# Patient Record
Sex: FEMALE | Race: WHITE | NOT HISPANIC OR LATINO | Employment: OTHER | URBAN - METROPOLITAN AREA
[De-identification: names, ages, dates, MRNs, and addresses within clinical notes are randomized per-mention and may not be internally consistent; named-entity substitution may affect disease eponyms.]

---

## 2020-09-09 ENCOUNTER — HOSPITAL ENCOUNTER (EMERGENCY)
Facility: HOSPITAL | Age: 85
Discharge: NURSING, REHAB OR LONG-TERM CARE | End: 2020-09-09
Attending: EMERGENCY MEDICINE | Admitting: EMERGENCY MEDICINE

## 2020-09-09 ENCOUNTER — APPOINTMENT (OUTPATIENT)
Dept: CT IMAGING | Facility: HOSPITAL | Age: 85
End: 2020-09-09

## 2020-09-09 VITALS
OXYGEN SATURATION: 98 % | RESPIRATION RATE: 16 BRPM | HEART RATE: 63 BPM | DIASTOLIC BLOOD PRESSURE: 83 MMHG | TEMPERATURE: 98.2 F | SYSTOLIC BLOOD PRESSURE: 146 MMHG

## 2020-09-09 DIAGNOSIS — H61.23 BILATERAL HEARING LOSS DUE TO CERUMEN IMPACTION: Primary | ICD-10-CM

## 2020-09-09 DIAGNOSIS — Z79.01 ANTICOAGULATED BY ANTICOAGULATION TREATMENT: ICD-10-CM

## 2020-09-09 DIAGNOSIS — R53.1 GENERALIZED WEAKNESS: ICD-10-CM

## 2020-09-09 LAB
ALBUMIN SERPL-MCNC: 3 G/DL (ref 3.5–5.2)
ALBUMIN/GLOB SERPL: 1.1 G/DL
ALP SERPL-CCNC: 83 U/L (ref 39–117)
ALT SERPL W P-5'-P-CCNC: 18 U/L (ref 1–33)
ANION GAP SERPL CALCULATED.3IONS-SCNC: 6 MMOL/L (ref 5–15)
AST SERPL-CCNC: 14 U/L (ref 1–32)
BASOPHILS # BLD AUTO: 0.03 10*3/MM3 (ref 0–0.2)
BASOPHILS NFR BLD AUTO: 0.4 % (ref 0–1.5)
BILIRUB SERPL-MCNC: 0.3 MG/DL (ref 0–1.2)
BUN SERPL-MCNC: 22 MG/DL (ref 8–23)
BUN/CREAT SERPL: 25.6 (ref 7–25)
CALCIUM SPEC-SCNC: 10.4 MG/DL (ref 8.6–10.5)
CHLORIDE SERPL-SCNC: 100 MMOL/L (ref 98–107)
CO2 SERPL-SCNC: 31 MMOL/L (ref 22–29)
CREAT SERPL-MCNC: 0.86 MG/DL (ref 0.57–1)
DEPRECATED RDW RBC AUTO: 40.6 FL (ref 37–54)
EOSINOPHIL # BLD AUTO: 0.32 10*3/MM3 (ref 0–0.4)
EOSINOPHIL NFR BLD AUTO: 4.3 % (ref 0.3–6.2)
ERYTHROCYTE [DISTWIDTH] IN BLOOD BY AUTOMATED COUNT: 11.9 % (ref 12.3–15.4)
GFR SERPL CREATININE-BSD FRML MDRD: 63 ML/MIN/1.73
GLOBULIN UR ELPH-MCNC: 2.8 GM/DL
GLUCOSE SERPL-MCNC: 180 MG/DL (ref 65–99)
HCT VFR BLD AUTO: 43.3 % (ref 34–46.6)
HGB BLD-MCNC: 14.1 G/DL (ref 12–15.9)
IMM GRANULOCYTES # BLD AUTO: 0.03 10*3/MM3 (ref 0–0.05)
IMM GRANULOCYTES NFR BLD AUTO: 0.4 % (ref 0–0.5)
INR PPP: 1.24 (ref 0.9–1.1)
LYMPHOCYTES # BLD AUTO: 2.37 10*3/MM3 (ref 0.7–3.1)
LYMPHOCYTES NFR BLD AUTO: 31.6 % (ref 19.6–45.3)
MCH RBC QN AUTO: 30.4 PG (ref 26.6–33)
MCHC RBC AUTO-ENTMCNC: 32.6 G/DL (ref 31.5–35.7)
MCV RBC AUTO: 93.3 FL (ref 79–97)
MONOCYTES # BLD AUTO: 0.47 10*3/MM3 (ref 0.1–0.9)
MONOCYTES NFR BLD AUTO: 6.3 % (ref 5–12)
NEUTROPHILS NFR BLD AUTO: 4.28 10*3/MM3 (ref 1.7–7)
NEUTROPHILS NFR BLD AUTO: 57 % (ref 42.7–76)
NRBC BLD AUTO-RTO: 0 /100 WBC (ref 0–0.2)
PLATELET # BLD AUTO: 221 10*3/MM3 (ref 140–450)
PMV BLD AUTO: 10.5 FL (ref 6–12)
POTASSIUM SERPL-SCNC: 4.4 MMOL/L (ref 3.5–5.2)
PROT SERPL-MCNC: 5.8 G/DL (ref 6–8.5)
PROTHROMBIN TIME: 15.4 SECONDS (ref 11.7–14.2)
RBC # BLD AUTO: 4.64 10*6/MM3 (ref 3.77–5.28)
SODIUM SERPL-SCNC: 137 MMOL/L (ref 136–145)
TROPONIN T SERPL-MCNC: 0.02 NG/ML (ref 0–0.03)
WBC # BLD AUTO: 7.5 10*3/MM3 (ref 3.4–10.8)

## 2020-09-09 PROCEDURE — 93010 ELECTROCARDIOGRAM REPORT: CPT | Performed by: INTERNAL MEDICINE

## 2020-09-09 PROCEDURE — 84484 ASSAY OF TROPONIN QUANT: CPT | Performed by: EMERGENCY MEDICINE

## 2020-09-09 PROCEDURE — 99284 EMERGENCY DEPT VISIT MOD MDM: CPT

## 2020-09-09 PROCEDURE — 85025 COMPLETE CBC W/AUTO DIFF WBC: CPT | Performed by: EMERGENCY MEDICINE

## 2020-09-09 PROCEDURE — 80053 COMPREHEN METABOLIC PANEL: CPT | Performed by: EMERGENCY MEDICINE

## 2020-09-09 PROCEDURE — 70450 CT HEAD/BRAIN W/O DYE: CPT

## 2020-09-09 PROCEDURE — 85610 PROTHROMBIN TIME: CPT | Performed by: EMERGENCY MEDICINE

## 2020-09-09 PROCEDURE — 93005 ELECTROCARDIOGRAM TRACING: CPT | Performed by: EMERGENCY MEDICINE

## 2020-09-09 RX ORDER — SODIUM CHLORIDE 0.9 % (FLUSH) 0.9 %
10 SYRINGE (ML) INJECTION AS NEEDED
Status: DISCONTINUED | OUTPATIENT
Start: 2020-09-09 | End: 2020-09-09 | Stop reason: HOSPADM

## 2020-09-09 RX ADMIN — CARBAMIDE PEROXIDE 6.5% 10 DROP: 6.5 LIQUID AURICULAR (OTIC) at 17:56

## 2020-09-09 NOTE — ED NOTES
I am wearing mask, goggles, and gloves. When designated I am also wearing apron and a face shield. The patient is wearing a surgical mask.      Shravan Awad RN  09/09/20 7993

## 2020-09-09 NOTE — ED PROVIDER NOTES
EMERGENCY DEPARTMENT ENCOUNTER    Room Number:  28/28  Date of encounter:  9/9/2020  PCP: Elly Mann APRN  Historian: Patient, nursing home records    I used full protective equipment while examining this patient.  This includes face mask, gloves and protective eyewear.  I washed my hands before entering the room and immediately upon leaving the room      HPI:  Chief Complaint: Generalized weakness, decreased hearing  A complete HPI/ROS/PMH/PSH/SH/FH are unobtainable due to: Patient is hard of hearing and also very poor historian    Context: Jolanta Phipps is a 85 y.o. female who presents to the ED c/o generalized weakness, decreased hearing.  Patient states she is not quite sure why she is here.  She does report generalized weakness ongoing for many months.  She states that she gets around generally and a wheelchair.  She has had several strokes in the past.  She reports that yesterday she had more difficulty getting around and was noted to have elevated blood pressure of 240/120 yesterday.  Blood pressure today was reported to be 144/77.  Nursing home reports APRN was concerned about possible stroke and sent her to the ER.      PAST MEDICAL HISTORY  Active Ambulatory Problems     Diagnosis Date Noted   • No Active Ambulatory Problems     Resolved Ambulatory Problems     Diagnosis Date Noted   • No Resolved Ambulatory Problems     No Additional Past Medical History         PAST SURGICAL HISTORY  History reviewed. No pertinent surgical history.      FAMILY HISTORY  No family history on file.      SOCIAL HISTORY  Social History     Socioeconomic History   • Marital status:      Spouse name: Not on file   • Number of children: Not on file   • Years of education: Not on file   • Highest education level: Not on file         ALLERGIES  Patient has no allergy information on record.       REVIEW OF SYSTEMS  Review of Systems   Unable to perform ROS: Dementia           PHYSICAL EXAM    I have reviewed the triage  vital signs and nursing notes.    ED Triage Vitals [09/09/20 1542]   Temp Heart Rate Resp BP SpO2   97.4 °F (36.3 °C) 55 16 116/72 98 %      Temp src Heart Rate Source Patient Position BP Location FiO2 (%)   Oral -- -- -- --       Physical Exam  GENERAL: Alert female, oriented to name and hospital.  She is very hard of hearing  HENT: nares patent, atraumatic  EYES: no scleral icterus  CV: Slightly bradycardic without murmur  RESPIRATORY: normal effort, clear to auscultation bilaterally-saturations 97% on room air  ABDOMEN: soft, morbid obesity-no significant tenderness to palpation  MUSCULOSKELETAL: Patient has tenderness to palpation of the left upper extremity and limited movement.  She states that she has chronic problems with her left shoulder.  NEURO: Patient has mild to moderate generalized weakness without apparent focal deficit.  Sensation, and coordination are grossly intact.  Speech is relatively clear without significant dysarthria or aphasia.  Patient is oriented to name and place but not year.  SKIN: warm, dry      LAB RESULTS  Recent Results (from the past 24 hour(s))   Comprehensive Metabolic Panel    Collection Time: 09/09/20  4:54 PM   Result Value Ref Range    Glucose 180 (H) 65 - 99 mg/dL    BUN 22 8 - 23 mg/dL    Creatinine 0.86 0.57 - 1.00 mg/dL    Sodium 137 136 - 145 mmol/L    Potassium 4.4 3.5 - 5.2 mmol/L    Chloride 100 98 - 107 mmol/L    CO2 31.0 (H) 22.0 - 29.0 mmol/L    Calcium 10.4 8.6 - 10.5 mg/dL    Total Protein 5.8 (L) 6.0 - 8.5 g/dL    Albumin 3.00 (L) 3.50 - 5.20 g/dL    ALT (SGPT) 18 1 - 33 U/L    AST (SGOT) 14 1 - 32 U/L    Alkaline Phosphatase 83 39 - 117 U/L    Total Bilirubin 0.3 0.0 - 1.2 mg/dL    eGFR Non African Amer 63 >60 mL/min/1.73    Globulin 2.8 gm/dL    A/G Ratio 1.1 g/dL    BUN/Creatinine Ratio 25.6 (H) 7.0 - 25.0    Anion Gap 6.0 5.0 - 15.0 mmol/L   Protime-INR    Collection Time: 09/09/20  4:54 PM   Result Value Ref Range    Protime 15.4 (H) 11.7 - 14.2 Seconds     INR 1.24 (H) 0.90 - 1.10   Troponin    Collection Time: 09/09/20  4:54 PM   Result Value Ref Range    Troponin T 0.018 0.000 - 0.030 ng/mL   CBC Auto Differential    Collection Time: 09/09/20  4:54 PM   Result Value Ref Range    WBC 7.50 3.40 - 10.80 10*3/mm3    RBC 4.64 3.77 - 5.28 10*6/mm3    Hemoglobin 14.1 12.0 - 15.9 g/dL    Hematocrit 43.3 34.0 - 46.6 %    MCV 93.3 79.0 - 97.0 fL    MCH 30.4 26.6 - 33.0 pg    MCHC 32.6 31.5 - 35.7 g/dL    RDW 11.9 (L) 12.3 - 15.4 %    RDW-SD 40.6 37.0 - 54.0 fl    MPV 10.5 6.0 - 12.0 fL    Platelets 221 140 - 450 10*3/mm3    Neutrophil % 57.0 42.7 - 76.0 %    Lymphocyte % 31.6 19.6 - 45.3 %    Monocyte % 6.3 5.0 - 12.0 %    Eosinophil % 4.3 0.3 - 6.2 %    Basophil % 0.4 0.0 - 1.5 %    Immature Grans % 0.4 0.0 - 0.5 %    Neutrophils, Absolute 4.28 1.70 - 7.00 10*3/mm3    Lymphocytes, Absolute 2.37 0.70 - 3.10 10*3/mm3    Monocytes, Absolute 0.47 0.10 - 0.90 10*3/mm3    Eosinophils, Absolute 0.32 0.00 - 0.40 10*3/mm3    Basophils, Absolute 0.03 0.00 - 0.20 10*3/mm3    Immature Grans, Absolute 0.03 0.00 - 0.05 10*3/mm3    nRBC 0.0 0.0 - 0.2 /100 WBC       Ordered the above labs and independently reviewed the results.      RADIOLOGY  Ct Head Without Contrast    Result Date: 9/9/2020  CT HEAD WO CONTRAST-  INDICATIONS: Weakness  TECHNIQUE: Radiation dose reduction techniques were utilized, including automated exposure control and exposure modulation based on body size. Noncontrast head CT  COMPARISON: None available  FINDINGS:    No acute intracranial hemorrhage, midline shift or mass effect. No acute territorial infarct is identified. Encephalomalacia/old infarct change is apparent in the bilateral parietal lobes. Small old lacunar infarcts of the thalami. An 8 mm extra-axial calcified focus along the right parietal bone could be calcified meningioma.  Moderate periventricular hypodensities suggest chronic small vessel ischemic change in a patient this age.  Arterial  calcifications are seen at the base of the brain.  Ventricles, cisterns, cerebral sulci are unremarkable for patient age.  The visualized paranasal sinuses, orbits, mastoid air cells are unremarkable.           No acute intracranial hemorrhage or hydrocephalus. Chronic changes. If there is further clinical concern, MRI could be considered for further evaluation.  This report was finalized on 9/9/2020 5:13 PM by Dr. Donato Celis M.D.        I ordered the above noted radiological studies. Reviewed by me and discussed with radiologist.  See dictation for official radiology interpretation.      PROCEDURES  Procedures      MEDICATIONS GIVEN IN ER    Medications   sodium chloride 0.9 % flush 10 mL (has no administration in time range)   carbamide peroxide (DEBROX) 6.5 % otic solution 10 drop (has no administration in time range)         PROGRESS, DATA ANALYSIS, CONSULTS, AND MEDICAL DECISION MAKING    All labs have been independently reviewed by me.  All radiology studies have been reviewed by me and discussed with radiologist dictating the report.   EKG's independently viewed and interpreted by me.  Discussion below represents my analysis of pertinent findings related to patient's condition, differential diagnosis, treatment plan and final disposition.      ED Course as of Sep 09 1738   Wed Sep 09, 2020   1649 EKG          EKG time: 1647  Rhythm/Rate: Sinus bradycardia 53  P waves and SD: Normal P waves and SD intervals  QRS, axis: Left axis deviation, LAFB  ST and T waves: Nonspecific ST and T wave changes    Interpreted Contemporaneously by me, independently viewed  No prior to compare      [DB]   1650 MDM-reviewed patient's prior medical records and note that patient is anticoagulated on Eliquis.  Prior medical history includes prior stroke, cognitive communication deficit, diabetes and chronic renal failure stage III among numerous medical problems.    [DB]   1718 I discussed patient's evaluation and work-up  with son at the bedside.  He states that she has been wheelchair-bound for about 2 years.  He states she has chronic weakness of the left upper extremity, likely related to fibromyalgia but known to be chronic.  He does mention that her hearing is decreased over baseline.  He states that he feels that she may have a cerumen impaction.  Upon examination he is correct and there is significant cerumen in both ear canals.  We will give Debrox drops to both ears here in the ED.    [DB]   1733 Labs are reviewed and are fairly unremarkable.  C MP shows normal BUN and creatinine.  LFTs and electrolytes are also benign.  Blood sugar of 180 is noted but not felt to be clinically relevant at this time.  INR elevation is consistent with patient taking Eliquis for anticoagulation.  At this point I see no indication for admission as patient is anticoagulated no evidence of acute stroke.  Blood pressures been reasonable.  Will DC home to use Debrox as needed for bilateral cerumen impaction.    [DB]      ED Course User Index  [DB] Ross Holland MD       AS OF 17:38 VITALS:    BP - 139/75  HR - 52  TEMP - 97.4 °F (36.3 °C) (Oral)  O2 SATS - 95%      DIAGNOSIS  Final diagnoses:   Bilateral hearing loss due to cerumen impaction   Generalized weakness   Anticoagulated by anticoagulation treatment         DISPOSITION  DISCHARGE    Patient discharged in stable condition.    Reviewed implications of results, diagnosis, meds, responsibility to follow up, warning signs and symptoms of possible worsening, potential complications and reasons to return to ER, including worsening symptoms or as needed.    Patient/Family voiced understanding of above instructions.    Discussed plan for discharge, as there is no emergent indication for admission. Patient referred to primary care provider for BP management due to today's BP. Pt/family is agreeable and understands need for follow up and repeat testing.  Pt is aware that discharge does not mean  that nothing is wrong but it indicates no emergency is present that requires admission and they must continue care with follow-up as given below or physician of their choice.     FOLLOW-UP  Elly Mann, APRN  [facility] [facility] [place] [address] [phone]    In 1 week  If Not Better         Medication List      New Prescriptions    carbamide peroxide 6.5 % otic solution  Commonly known as:  DEBROX  Administer 5 drops into both ears As Needed (Cerumen impaction).                   Ross Holland MD  09/09/20 1735       Ross Holland MD  09/09/20 1734

## 2020-09-09 NOTE — ED TRIAGE NOTES
Pt arrives via EMS from [facility]. EMS was called for possible stroke. When EMS arrived pt was sitting in bed eating her lunch. No neuro deficits reported by facility or noted at this time. Staff reported pt was hypertensive yesterday (240/120) but today her BP has been WNL. Staff at facility wants pt checked for difficulty hearing. Pt is alert and oriented x 2. NAD. Pt masked at triage.

## 2021-01-01 ENCOUNTER — APPOINTMENT (OUTPATIENT)
Dept: CT IMAGING | Facility: HOSPITAL | Age: 86
End: 2021-01-01

## 2021-01-01 ENCOUNTER — APPOINTMENT (OUTPATIENT)
Dept: CARDIOLOGY | Facility: HOSPITAL | Age: 86
End: 2021-01-01

## 2021-01-01 ENCOUNTER — HOSPITAL ENCOUNTER (INPATIENT)
Facility: HOSPITAL | Age: 86
LOS: 3 days | Discharge: HOME | End: 2021-12-13
Attending: EMERGENCY MEDICINE | Admitting: STUDENT IN AN ORGANIZED HEALTH CARE EDUCATION/TRAINING PROGRAM

## 2021-01-01 ENCOUNTER — APPOINTMENT (OUTPATIENT)
Dept: GENERAL RADIOLOGY | Facility: HOSPITAL | Age: 86
End: 2021-01-01

## 2021-01-01 VITALS
DIASTOLIC BLOOD PRESSURE: 78 MMHG | RESPIRATION RATE: 18 BRPM | HEART RATE: 79 BPM | WEIGHT: 293 LBS | HEIGHT: 68 IN | SYSTOLIC BLOOD PRESSURE: 144 MMHG | OXYGEN SATURATION: 93 % | BODY MASS INDEX: 44.41 KG/M2 | TEMPERATURE: 97.6 F

## 2021-01-01 DIAGNOSIS — R73.9 HYPERGLYCEMIA: ICD-10-CM

## 2021-01-01 DIAGNOSIS — G93.40 ACUTE ENCEPHALOPATHY: ICD-10-CM

## 2021-01-01 DIAGNOSIS — N39.0 ACUTE UTI: Primary | ICD-10-CM

## 2021-01-01 LAB
ADV 40+41 DNA STL QL NAA+NON-PROBE: NOT DETECTED
ALBUMIN SERPL-MCNC: 3 G/DL (ref 3.5–5.2)
ALBUMIN/GLOB SERPL: 0.8 G/DL
ALP SERPL-CCNC: 85 U/L (ref 39–117)
ALT SERPL W P-5'-P-CCNC: 13 U/L (ref 1–33)
ANION GAP SERPL CALCULATED.3IONS-SCNC: 10 MMOL/L (ref 5–15)
ANION GAP SERPL CALCULATED.3IONS-SCNC: 10 MMOL/L (ref 5–15)
ANION GAP SERPL CALCULATED.3IONS-SCNC: 3.6 MMOL/L (ref 5–15)
ANION GAP SERPL CALCULATED.3IONS-SCNC: 4.9 MMOL/L (ref 5–15)
ANION GAP SERPL CALCULATED.3IONS-SCNC: 8.8 MMOL/L (ref 5–15)
ANION GAP SERPL CALCULATED.3IONS-SCNC: 9.9 MMOL/L (ref 5–15)
AST SERPL-CCNC: 12 U/L (ref 1–32)
ASTRO TYP 1-8 RNA STL QL NAA+NON-PROBE: NOT DETECTED
BACTERIA SPEC AEROBE CULT: ABNORMAL
BACTERIA SPEC AEROBE CULT: NORMAL
BACTERIA SPEC AEROBE CULT: NORMAL
BACTERIA UR QL AUTO: ABNORMAL /HPF
BASOPHILS # BLD AUTO: 0.03 10*3/MM3 (ref 0–0.2)
BASOPHILS # BLD AUTO: 0.04 10*3/MM3 (ref 0–0.2)
BASOPHILS # BLD AUTO: 0.04 10*3/MM3 (ref 0–0.2)
BASOPHILS NFR BLD AUTO: 0.3 % (ref 0–1.5)
BASOPHILS NFR BLD AUTO: 0.3 % (ref 0–1.5)
BASOPHILS NFR BLD AUTO: 0.4 % (ref 0–1.5)
BASOPHILS NFR BLD AUTO: 0.4 % (ref 0–1.5)
BASOPHILS NFR BLD AUTO: 0.5 % (ref 0–1.5)
BH CV LOWER VASCULAR LEFT COMMON FEMORAL AUGMENT: NORMAL
BH CV LOWER VASCULAR LEFT COMMON FEMORAL COMPETENT: NORMAL
BH CV LOWER VASCULAR LEFT COMMON FEMORAL COMPRESS: NORMAL
BH CV LOWER VASCULAR LEFT COMMON FEMORAL PHASIC: NORMAL
BH CV LOWER VASCULAR LEFT COMMON FEMORAL SPONT: NORMAL
BH CV LOWER VASCULAR RIGHT COMMON FEMORAL AUGMENT: NORMAL
BH CV LOWER VASCULAR RIGHT COMMON FEMORAL COMPETENT: NORMAL
BH CV LOWER VASCULAR RIGHT COMMON FEMORAL COMPRESS: NORMAL
BH CV LOWER VASCULAR RIGHT COMMON FEMORAL PHASIC: NORMAL
BH CV LOWER VASCULAR RIGHT COMMON FEMORAL SPONT: NORMAL
BH CV LOWER VASCULAR RIGHT DISTAL FEMORAL COMPRESS: NORMAL
BH CV LOWER VASCULAR RIGHT GASTRONEMIUS COMPRESS: NORMAL
BH CV LOWER VASCULAR RIGHT GREATER SAPH AK COMPRESS: NORMAL
BH CV LOWER VASCULAR RIGHT GREATER SAPH BK COMPRESS: NORMAL
BH CV LOWER VASCULAR RIGHT LESSER SAPH COMPRESS: NORMAL
BH CV LOWER VASCULAR RIGHT MID FEMORAL AUGMENT: NORMAL
BH CV LOWER VASCULAR RIGHT MID FEMORAL COMPETENT: NORMAL
BH CV LOWER VASCULAR RIGHT MID FEMORAL COMPRESS: NORMAL
BH CV LOWER VASCULAR RIGHT MID FEMORAL PHASIC: NORMAL
BH CV LOWER VASCULAR RIGHT MID FEMORAL SPONT: NORMAL
BH CV LOWER VASCULAR RIGHT PERONEAL COMPRESS: NORMAL
BH CV LOWER VASCULAR RIGHT POPLITEAL AUGMENT: NORMAL
BH CV LOWER VASCULAR RIGHT POPLITEAL COMPETENT: NORMAL
BH CV LOWER VASCULAR RIGHT POPLITEAL COMPRESS: NORMAL
BH CV LOWER VASCULAR RIGHT POPLITEAL PHASIC: NORMAL
BH CV LOWER VASCULAR RIGHT POPLITEAL SPONT: NORMAL
BH CV LOWER VASCULAR RIGHT POSTERIOR TIBIAL COMPRESS: NORMAL
BH CV LOWER VASCULAR RIGHT PROFUNDA FEMORAL COMPRESS: NORMAL
BH CV LOWER VASCULAR RIGHT PROXIMAL FEMORAL COMPRESS: NORMAL
BH CV LOWER VASCULAR RIGHT SAPHENOFEMORAL JUNCTION COMPRESS: NORMAL
BILIRUB SERPL-MCNC: 0.4 MG/DL (ref 0–1.2)
BILIRUB UR QL STRIP: NEGATIVE
BUN SERPL-MCNC: 10 MG/DL (ref 8–23)
BUN SERPL-MCNC: 11 MG/DL (ref 8–23)
BUN SERPL-MCNC: 11 MG/DL (ref 8–23)
BUN SERPL-MCNC: 12 MG/DL (ref 8–23)
BUN SERPL-MCNC: 13 MG/DL (ref 8–23)
BUN SERPL-MCNC: 13 MG/DL (ref 8–23)
BUN/CREAT SERPL: 14.1 (ref 7–25)
BUN/CREAT SERPL: 14.8 (ref 7–25)
BUN/CREAT SERPL: 16.4 (ref 7–25)
BUN/CREAT SERPL: 17.6 (ref 7–25)
BUN/CREAT SERPL: 18.3 (ref 7–25)
BUN/CREAT SERPL: 22.6 (ref 7–25)
C CAYETANENSIS DNA STL QL NAA+NON-PROBE: NOT DETECTED
C COLI+JEJ+UPSA DNA STL QL NAA+NON-PROBE: NOT DETECTED
CALCIUM SPEC-SCNC: 10.1 MG/DL (ref 8.6–10.5)
CALCIUM SPEC-SCNC: 10.1 MG/DL (ref 8.6–10.5)
CALCIUM SPEC-SCNC: 10.2 MG/DL (ref 8.6–10.5)
CALCIUM SPEC-SCNC: 10.2 MG/DL (ref 8.6–10.5)
CALCIUM SPEC-SCNC: 10.4 MG/DL (ref 8.6–10.5)
CALCIUM SPEC-SCNC: 9.6 MG/DL (ref 8.6–10.5)
CHLORIDE SERPL-SCNC: 100 MMOL/L (ref 98–107)
CHLORIDE SERPL-SCNC: 101 MMOL/L (ref 98–107)
CHLORIDE SERPL-SCNC: 102 MMOL/L (ref 98–107)
CHLORIDE SERPL-SCNC: 99 MMOL/L (ref 98–107)
CLARITY UR: ABNORMAL
CO2 SERPL-SCNC: 25.2 MMOL/L (ref 22–29)
CO2 SERPL-SCNC: 26.1 MMOL/L (ref 22–29)
CO2 SERPL-SCNC: 27 MMOL/L (ref 22–29)
CO2 SERPL-SCNC: 27 MMOL/L (ref 22–29)
CO2 SERPL-SCNC: 29.4 MMOL/L (ref 22–29)
CO2 SERPL-SCNC: 32.1 MMOL/L (ref 22–29)
COLOR UR: YELLOW
CREAT SERPL-MCNC: 0.53 MG/DL (ref 0.57–1)
CREAT SERPL-MCNC: 0.6 MG/DL (ref 0.57–1)
CREAT SERPL-MCNC: 0.61 MG/DL (ref 0.57–1)
CREAT SERPL-MCNC: 0.74 MG/DL (ref 0.57–1)
CREAT SERPL-MCNC: 0.78 MG/DL (ref 0.57–1)
CREAT SERPL-MCNC: 0.88 MG/DL (ref 0.57–1)
CREAT UR-MCNC: 41.7 MG/DL
CRYPTOSP DNA STL QL NAA+NON-PROBE: NOT DETECTED
DEPRECATED RDW RBC AUTO: 40.8 FL (ref 37–54)
DEPRECATED RDW RBC AUTO: 41.2 FL (ref 37–54)
DEPRECATED RDW RBC AUTO: 42.2 FL (ref 37–54)
DEPRECATED RDW RBC AUTO: 42.6 FL (ref 37–54)
DEPRECATED RDW RBC AUTO: 42.9 FL (ref 37–54)
E HISTOLYT DNA STL QL NAA+NON-PROBE: NOT DETECTED
EAEC PAA PLAS AGGR+AATA ST NAA+NON-PRB: DETECTED
EC STX1+STX2 GENES STL QL NAA+NON-PROBE: NOT DETECTED
EOSINOPHIL # BLD AUTO: 0.26 10*3/MM3 (ref 0–0.4)
EOSINOPHIL # BLD AUTO: 0.33 10*3/MM3 (ref 0–0.4)
EOSINOPHIL # BLD AUTO: 0.36 10*3/MM3 (ref 0–0.4)
EOSINOPHIL # BLD AUTO: 0.39 10*3/MM3 (ref 0–0.4)
EOSINOPHIL # BLD AUTO: 0.39 10*3/MM3 (ref 0–0.4)
EOSINOPHIL NFR BLD AUTO: 3 % (ref 0.3–6.2)
EOSINOPHIL NFR BLD AUTO: 3.9 % (ref 0.3–6.2)
EOSINOPHIL NFR BLD AUTO: 4 % (ref 0.3–6.2)
EOSINOPHIL NFR BLD AUTO: 4.2 % (ref 0.3–6.2)
EOSINOPHIL NFR BLD AUTO: 5 % (ref 0.3–6.2)
EPEC EAE GENE STL QL NAA+NON-PROBE: NOT DETECTED
ERYTHROCYTE [DISTWIDTH] IN BLOOD BY AUTOMATED COUNT: 12.8 % (ref 12.3–15.4)
ERYTHROCYTE [DISTWIDTH] IN BLOOD BY AUTOMATED COUNT: 12.9 % (ref 12.3–15.4)
ERYTHROCYTE [DISTWIDTH] IN BLOOD BY AUTOMATED COUNT: 12.9 % (ref 12.3–15.4)
ERYTHROCYTE [DISTWIDTH] IN BLOOD BY AUTOMATED COUNT: 13 % (ref 12.3–15.4)
ERYTHROCYTE [DISTWIDTH] IN BLOOD BY AUTOMATED COUNT: 13 % (ref 12.3–15.4)
ETEC LTA+ST1A+ST1B TOX ST NAA+NON-PROBE: NOT DETECTED
G LAMBLIA DNA STL QL NAA+NON-PROBE: NOT DETECTED
GFR SERPL CREATININE-BSD FRML MDRD: 109 ML/MIN/1.73
GFR SERPL CREATININE-BSD FRML MDRD: 61 ML/MIN/1.73
GFR SERPL CREATININE-BSD FRML MDRD: 70 ML/MIN/1.73
GFR SERPL CREATININE-BSD FRML MDRD: 74 ML/MIN/1.73
GFR SERPL CREATININE-BSD FRML MDRD: 93 ML/MIN/1.73
GFR SERPL CREATININE-BSD FRML MDRD: 95 ML/MIN/1.73
GLOBULIN UR ELPH-MCNC: 3.6 GM/DL
GLUCOSE BLDC GLUCOMTR-MCNC: 165 MG/DL (ref 70–130)
GLUCOSE BLDC GLUCOMTR-MCNC: 171 MG/DL (ref 70–130)
GLUCOSE BLDC GLUCOMTR-MCNC: 181 MG/DL (ref 70–130)
GLUCOSE BLDC GLUCOMTR-MCNC: 191 MG/DL (ref 70–130)
GLUCOSE BLDC GLUCOMTR-MCNC: 199 MG/DL (ref 70–130)
GLUCOSE BLDC GLUCOMTR-MCNC: 202 MG/DL (ref 70–130)
GLUCOSE BLDC GLUCOMTR-MCNC: 209 MG/DL (ref 70–130)
GLUCOSE BLDC GLUCOMTR-MCNC: 212 MG/DL (ref 70–130)
GLUCOSE BLDC GLUCOMTR-MCNC: 213 MG/DL (ref 70–130)
GLUCOSE BLDC GLUCOMTR-MCNC: 218 MG/DL (ref 70–130)
GLUCOSE BLDC GLUCOMTR-MCNC: 226 MG/DL (ref 70–130)
GLUCOSE BLDC GLUCOMTR-MCNC: 242 MG/DL (ref 70–130)
GLUCOSE BLDC GLUCOMTR-MCNC: 243 MG/DL (ref 70–130)
GLUCOSE BLDC GLUCOMTR-MCNC: 246 MG/DL (ref 70–130)
GLUCOSE BLDC GLUCOMTR-MCNC: 251 MG/DL (ref 70–130)
GLUCOSE BLDC GLUCOMTR-MCNC: 253 MG/DL (ref 70–130)
GLUCOSE BLDC GLUCOMTR-MCNC: 256 MG/DL (ref 70–130)
GLUCOSE BLDC GLUCOMTR-MCNC: 259 MG/DL (ref 70–130)
GLUCOSE BLDC GLUCOMTR-MCNC: 266 MG/DL (ref 70–130)
GLUCOSE BLDC GLUCOMTR-MCNC: 283 MG/DL (ref 70–130)
GLUCOSE BLDC GLUCOMTR-MCNC: 285 MG/DL (ref 70–130)
GLUCOSE SERPL-MCNC: 171 MG/DL (ref 65–99)
GLUCOSE SERPL-MCNC: 194 MG/DL (ref 65–99)
GLUCOSE SERPL-MCNC: 218 MG/DL (ref 65–99)
GLUCOSE SERPL-MCNC: 231 MG/DL (ref 65–99)
GLUCOSE SERPL-MCNC: 239 MG/DL (ref 65–99)
GLUCOSE SERPL-MCNC: 294 MG/DL (ref 65–99)
GLUCOSE UR STRIP-MCNC: ABNORMAL MG/DL
HCT VFR BLD AUTO: 38.1 % (ref 34–46.6)
HCT VFR BLD AUTO: 38.4 % (ref 34–46.6)
HCT VFR BLD AUTO: 40.7 % (ref 34–46.6)
HCT VFR BLD AUTO: 42.1 % (ref 34–46.6)
HCT VFR BLD AUTO: 43 % (ref 34–46.6)
HGB BLD-MCNC: 12.2 G/DL (ref 12–15.9)
HGB BLD-MCNC: 12.3 G/DL (ref 12–15.9)
HGB BLD-MCNC: 13.2 G/DL (ref 12–15.9)
HGB BLD-MCNC: 13.2 G/DL (ref 12–15.9)
HGB BLD-MCNC: 13.3 G/DL (ref 12–15.9)
HGB UR QL STRIP.AUTO: ABNORMAL
HYALINE CASTS UR QL AUTO: ABNORMAL /LPF
IMM GRANULOCYTES # BLD AUTO: 0.02 10*3/MM3 (ref 0–0.05)
IMM GRANULOCYTES # BLD AUTO: 0.03 10*3/MM3 (ref 0–0.05)
IMM GRANULOCYTES # BLD AUTO: 0.03 10*3/MM3 (ref 0–0.05)
IMM GRANULOCYTES # BLD AUTO: 0.04 10*3/MM3 (ref 0–0.05)
IMM GRANULOCYTES # BLD AUTO: 0.04 10*3/MM3 (ref 0–0.05)
IMM GRANULOCYTES NFR BLD AUTO: 0.3 % (ref 0–0.5)
IMM GRANULOCYTES NFR BLD AUTO: 0.4 % (ref 0–0.5)
IMM GRANULOCYTES NFR BLD AUTO: 0.5 % (ref 0–0.5)
KETONES UR QL STRIP: NEGATIVE
LEUKOCYTE ESTERASE UR QL STRIP.AUTO: ABNORMAL
LYMPHOCYTES # BLD AUTO: 2.4 10*3/MM3 (ref 0.7–3.1)
LYMPHOCYTES # BLD AUTO: 2.69 10*3/MM3 (ref 0.7–3.1)
LYMPHOCYTES # BLD AUTO: 3.18 10*3/MM3 (ref 0.7–3.1)
LYMPHOCYTES # BLD AUTO: 3.53 10*3/MM3 (ref 0.7–3.1)
LYMPHOCYTES # BLD AUTO: 3.55 10*3/MM3 (ref 0.7–3.1)
LYMPHOCYTES NFR BLD AUTO: 27.3 % (ref 19.6–45.3)
LYMPHOCYTES NFR BLD AUTO: 34.5 % (ref 19.6–45.3)
LYMPHOCYTES NFR BLD AUTO: 35.2 % (ref 19.6–45.3)
LYMPHOCYTES NFR BLD AUTO: 38.2 % (ref 19.6–45.3)
LYMPHOCYTES NFR BLD AUTO: 41.5 % (ref 19.6–45.3)
MAGNESIUM SERPL-MCNC: 1.7 MG/DL (ref 1.6–2.4)
MAXIMAL PREDICTED HEART RATE: 133 BPM
MCH RBC QN AUTO: 28 PG (ref 26.6–33)
MCH RBC QN AUTO: 28.2 PG (ref 26.6–33)
MCH RBC QN AUTO: 28.2 PG (ref 26.6–33)
MCH RBC QN AUTO: 28.4 PG (ref 26.6–33)
MCH RBC QN AUTO: 28.5 PG (ref 26.6–33)
MCHC RBC AUTO-ENTMCNC: 30.9 G/DL (ref 31.5–35.7)
MCHC RBC AUTO-ENTMCNC: 31.4 G/DL (ref 31.5–35.7)
MCHC RBC AUTO-ENTMCNC: 32 G/DL (ref 31.5–35.7)
MCHC RBC AUTO-ENTMCNC: 32 G/DL (ref 31.5–35.7)
MCHC RBC AUTO-ENTMCNC: 32.4 G/DL (ref 31.5–35.7)
MCV RBC AUTO: 87.5 FL (ref 79–97)
MCV RBC AUTO: 88 FL (ref 79–97)
MCV RBC AUTO: 89.1 FL (ref 79–97)
MCV RBC AUTO: 89.4 FL (ref 79–97)
MCV RBC AUTO: 91.1 FL (ref 79–97)
MONOCYTES # BLD AUTO: 0.7 10*3/MM3 (ref 0.1–0.9)
MONOCYTES # BLD AUTO: 0.7 10*3/MM3 (ref 0.1–0.9)
MONOCYTES # BLD AUTO: 0.75 10*3/MM3 (ref 0.1–0.9)
MONOCYTES # BLD AUTO: 0.77 10*3/MM3 (ref 0.1–0.9)
MONOCYTES # BLD AUTO: 0.82 10*3/MM3 (ref 0.1–0.9)
MONOCYTES NFR BLD AUTO: 8.2 % (ref 5–12)
MONOCYTES NFR BLD AUTO: 8.3 % (ref 5–12)
MONOCYTES NFR BLD AUTO: 8.8 % (ref 5–12)
MONOCYTES NFR BLD AUTO: 8.9 % (ref 5–12)
MONOCYTES NFR BLD AUTO: 9 % (ref 5–12)
NEUTROPHILS NFR BLD AUTO: 3.9 10*3/MM3 (ref 1.7–7)
NEUTROPHILS NFR BLD AUTO: 3.97 10*3/MM3 (ref 1.7–7)
NEUTROPHILS NFR BLD AUTO: 4.44 10*3/MM3 (ref 1.7–7)
NEUTROPHILS NFR BLD AUTO: 4.68 10*3/MM3 (ref 1.7–7)
NEUTROPHILS NFR BLD AUTO: 45.4 % (ref 42.7–76)
NEUTROPHILS NFR BLD AUTO: 48 % (ref 42.7–76)
NEUTROPHILS NFR BLD AUTO: 5.31 10*3/MM3 (ref 1.7–7)
NEUTROPHILS NFR BLD AUTO: 50.8 % (ref 42.7–76)
NEUTROPHILS NFR BLD AUTO: 51.8 % (ref 42.7–76)
NEUTROPHILS NFR BLD AUTO: 60.3 % (ref 42.7–76)
NITRITE UR QL STRIP: POSITIVE
NOROVIRUS GI+II RNA STL QL NAA+NON-PROBE: NOT DETECTED
NRBC BLD AUTO-RTO: 0 /100 WBC (ref 0–0.2)
NRBC BLD AUTO-RTO: 0.1 /100 WBC (ref 0–0.2)
NRBC BLD AUTO-RTO: 0.1 /100 WBC (ref 0–0.2)
NT-PROBNP SERPL-MCNC: 144.5 PG/ML (ref 0–1800)
P SHIGELLOIDES DNA STL QL NAA+NON-PROBE: NOT DETECTED
PH UR STRIP.AUTO: 5.5 [PH] (ref 5–8)
PHOSPHATE SERPL-MCNC: 3 MG/DL (ref 2.5–4.5)
PLATELET # BLD AUTO: 236 10*3/MM3 (ref 140–450)
PLATELET # BLD AUTO: 250 10*3/MM3 (ref 140–450)
PLATELET # BLD AUTO: 279 10*3/MM3 (ref 140–450)
PLATELET # BLD AUTO: 298 10*3/MM3 (ref 140–450)
PLATELET # BLD AUTO: 314 10*3/MM3 (ref 140–450)
PMV BLD AUTO: 10 FL (ref 6–12)
PMV BLD AUTO: 10 FL (ref 6–12)
PMV BLD AUTO: 10.3 FL (ref 6–12)
PMV BLD AUTO: 9.8 FL (ref 6–12)
PMV BLD AUTO: 9.9 FL (ref 6–12)
POTASSIUM SERPL-SCNC: 4.1 MMOL/L (ref 3.5–5.2)
POTASSIUM SERPL-SCNC: 4.2 MMOL/L (ref 3.5–5.2)
POTASSIUM SERPL-SCNC: 4.2 MMOL/L (ref 3.5–5.2)
POTASSIUM SERPL-SCNC: 4.3 MMOL/L (ref 3.5–5.2)
POTASSIUM SERPL-SCNC: 4.6 MMOL/L (ref 3.5–5.2)
POTASSIUM SERPL-SCNC: 4.7 MMOL/L (ref 3.5–5.2)
PROT ?TM UR-MCNC: 113 MG/DL
PROT SERPL-MCNC: 6.6 G/DL (ref 6–8.5)
PROT UR QL STRIP: ABNORMAL
PROT/CREAT UR: 2709.8 MG/G CREA (ref 0–200)
RBC # BLD AUTO: 4.31 10*6/MM3 (ref 3.77–5.28)
RBC # BLD AUTO: 4.33 10*6/MM3 (ref 3.77–5.28)
RBC # BLD AUTO: 4.65 10*6/MM3 (ref 3.77–5.28)
RBC # BLD AUTO: 4.71 10*6/MM3 (ref 3.77–5.28)
RBC # BLD AUTO: 4.72 10*6/MM3 (ref 3.77–5.28)
RBC # UR STRIP: ABNORMAL /HPF
REF LAB TEST METHOD: ABNORMAL
RVA RNA STL QL NAA+NON-PROBE: NOT DETECTED
S ENT+BONG DNA STL QL NAA+NON-PROBE: NOT DETECTED
SAPO I+II+IV+V RNA STL QL NAA+NON-PROBE: NOT DETECTED
SARS-COV-2 RNA PNL SPEC NAA+PROBE: NOT DETECTED
SHIGELLA SP+EIEC IPAH ST NAA+NON-PROBE: NOT DETECTED
SODIUM SERPL-SCNC: 135 MMOL/L (ref 136–145)
SODIUM SERPL-SCNC: 136 MMOL/L (ref 136–145)
SODIUM SERPL-SCNC: 136 MMOL/L (ref 136–145)
SODIUM SERPL-SCNC: 137 MMOL/L (ref 136–145)
SODIUM SERPL-SCNC: 137 MMOL/L (ref 136–145)
SODIUM SERPL-SCNC: 139 MMOL/L (ref 136–145)
SP GR UR STRIP: 1.01 (ref 1–1.03)
SQUAMOUS #/AREA URNS HPF: ABNORMAL /HPF
STRESS TARGET HR: 113 BPM
UROBILINOGEN UR QL STRIP: ABNORMAL
V CHOL+PARA+VUL DNA STL QL NAA+NON-PROBE: NOT DETECTED
V CHOLERAE DNA STL QL NAA+NON-PROBE: NOT DETECTED
VANCOMYCIN TROUGH SERPL-MCNC: 15 MCG/ML (ref 5–20)
WBC # UR STRIP: ABNORMAL /HPF
WBC NRBC COR # BLD: 7.8 10*3/MM3 (ref 3.4–10.8)
WBC NRBC COR # BLD: 8.56 10*3/MM3 (ref 3.4–10.8)
WBC NRBC COR # BLD: 8.8 10*3/MM3 (ref 3.4–10.8)
WBC NRBC COR # BLD: 9.04 10*3/MM3 (ref 3.4–10.8)
WBC NRBC COR # BLD: 9.25 10*3/MM3 (ref 3.4–10.8)
Y ENTEROCOL DNA STL QL NAA+NON-PROBE: NOT DETECTED

## 2021-01-01 PROCEDURE — 0097U HC BIOFIRE FILMARRAY GI PANEL: CPT | Performed by: INTERNAL MEDICINE

## 2021-01-01 PROCEDURE — 85025 COMPLETE CBC W/AUTO DIFF WBC: CPT | Performed by: HOSPITALIST

## 2021-01-01 PROCEDURE — 63710000001 INSULIN LISPRO (HUMAN) PER 5 UNITS: Performed by: HOSPITALIST

## 2021-01-01 PROCEDURE — 84156 ASSAY OF PROTEIN URINE: CPT | Performed by: INTERNAL MEDICINE

## 2021-01-01 PROCEDURE — 63710000001 INSULIN GLARGINE PER 5 UNITS: Performed by: HOSPITALIST

## 2021-01-01 PROCEDURE — 63710000001 INSULIN LISPRO (HUMAN) PER 5 UNITS: Performed by: INTERNAL MEDICINE

## 2021-01-01 PROCEDURE — 71045 X-RAY EXAM CHEST 1 VIEW: CPT

## 2021-01-01 PROCEDURE — G0378 HOSPITAL OBSERVATION PER HR: HCPCS

## 2021-01-01 PROCEDURE — 81001 URINALYSIS AUTO W/SCOPE: CPT | Performed by: EMERGENCY MEDICINE

## 2021-01-01 PROCEDURE — 87086 URINE CULTURE/COLONY COUNT: CPT | Performed by: EMERGENCY MEDICINE

## 2021-01-01 PROCEDURE — 83880 ASSAY OF NATRIURETIC PEPTIDE: CPT | Performed by: INTERNAL MEDICINE

## 2021-01-01 PROCEDURE — 63710000001 INSULIN GLARGINE PER 5 UNITS: Performed by: INTERNAL MEDICINE

## 2021-01-01 PROCEDURE — 99232 SBSQ HOSP IP/OBS MODERATE 35: CPT | Performed by: STUDENT IN AN ORGANIZED HEALTH CARE EDUCATION/TRAINING PROGRAM

## 2021-01-01 PROCEDURE — 25010000002 CEFTRIAXONE PER 250 MG: Performed by: INTERNAL MEDICINE

## 2021-01-01 PROCEDURE — 80053 COMPREHEN METABOLIC PANEL: CPT | Performed by: EMERGENCY MEDICINE

## 2021-01-01 PROCEDURE — 25010000002 VANCOMYCIN 10 G RECONSTITUTED SOLUTION: Performed by: STUDENT IN AN ORGANIZED HEALTH CARE EDUCATION/TRAINING PROGRAM

## 2021-01-01 PROCEDURE — 80048 BASIC METABOLIC PNL TOTAL CA: CPT | Performed by: INTERNAL MEDICINE

## 2021-01-01 PROCEDURE — 92610 EVALUATE SWALLOWING FUNCTION: CPT

## 2021-01-01 PROCEDURE — 73700 CT LOWER EXTREMITY W/O DYE: CPT

## 2021-01-01 PROCEDURE — 83735 ASSAY OF MAGNESIUM: CPT | Performed by: STUDENT IN AN ORGANIZED HEALTH CARE EDUCATION/TRAINING PROGRAM

## 2021-01-01 PROCEDURE — 80048 BASIC METABOLIC PNL TOTAL CA: CPT | Performed by: HOSPITALIST

## 2021-01-01 PROCEDURE — 94799 UNLISTED PULMONARY SVC/PX: CPT

## 2021-01-01 PROCEDURE — 73560 X-RAY EXAM OF KNEE 1 OR 2: CPT

## 2021-01-01 PROCEDURE — 82962 GLUCOSE BLOOD TEST: CPT

## 2021-01-01 PROCEDURE — 82570 ASSAY OF URINE CREATININE: CPT | Performed by: INTERNAL MEDICINE

## 2021-01-01 PROCEDURE — 87147 CULTURE TYPE IMMUNOLOGIC: CPT | Performed by: EMERGENCY MEDICINE

## 2021-01-01 PROCEDURE — 85025 COMPLETE CBC W/AUTO DIFF WBC: CPT | Performed by: EMERGENCY MEDICINE

## 2021-01-01 PROCEDURE — 80048 BASIC METABOLIC PNL TOTAL CA: CPT | Performed by: STUDENT IN AN ORGANIZED HEALTH CARE EDUCATION/TRAINING PROGRAM

## 2021-01-01 PROCEDURE — 99284 EMERGENCY DEPT VISIT MOD MDM: CPT

## 2021-01-01 PROCEDURE — 84100 ASSAY OF PHOSPHORUS: CPT | Performed by: STUDENT IN AN ORGANIZED HEALTH CARE EDUCATION/TRAINING PROGRAM

## 2021-01-01 PROCEDURE — 97165 OT EVAL LOW COMPLEX 30 MIN: CPT

## 2021-01-01 PROCEDURE — 93971 EXTREMITY STUDY: CPT

## 2021-01-01 PROCEDURE — 80202 ASSAY OF VANCOMYCIN: CPT | Performed by: STUDENT IN AN ORGANIZED HEALTH CARE EDUCATION/TRAINING PROGRAM

## 2021-01-01 PROCEDURE — 85025 COMPLETE CBC W/AUTO DIFF WBC: CPT | Performed by: INTERNAL MEDICINE

## 2021-01-01 PROCEDURE — 25010000002 CEFTRIAXONE PER 250 MG: Performed by: EMERGENCY MEDICINE

## 2021-01-01 PROCEDURE — 87186 SC STD MICRODIL/AGAR DIL: CPT | Performed by: EMERGENCY MEDICINE

## 2021-01-01 PROCEDURE — 87635 SARS-COV-2 COVID-19 AMP PRB: CPT | Performed by: EMERGENCY MEDICINE

## 2021-01-01 PROCEDURE — 99223 1ST HOSP IP/OBS HIGH 75: CPT | Performed by: STUDENT IN AN ORGANIZED HEALTH CARE EDUCATION/TRAINING PROGRAM

## 2021-01-01 PROCEDURE — 87040 BLOOD CULTURE FOR BACTERIA: CPT | Performed by: STUDENT IN AN ORGANIZED HEALTH CARE EDUCATION/TRAINING PROGRAM

## 2021-01-01 RX ORDER — MUSCLE RUB CREAM 100; 150 MG/G; MG/G
1 CREAM TOPICAL EVERY 4 HOURS PRN
COMMUNITY

## 2021-01-01 RX ORDER — SENNOSIDES 8.6 MG
650 CAPSULE ORAL 3 TIMES DAILY
COMMUNITY

## 2021-01-01 RX ORDER — BISACODYL 5 MG/1
5 TABLET, DELAYED RELEASE ORAL DAILY PRN
Status: DISCONTINUED | OUTPATIENT
Start: 2021-01-01 | End: 2021-01-01 | Stop reason: HOSPADM

## 2021-01-01 RX ORDER — UREA 10 %
1 LOTION (ML) TOPICAL NIGHTLY PRN
Status: DISCONTINUED | OUTPATIENT
Start: 2021-01-01 | End: 2021-01-01 | Stop reason: HOSPADM

## 2021-01-01 RX ORDER — LEVOTHYROXINE SODIUM 0.15 MG/1
150 TABLET ORAL DAILY
COMMUNITY

## 2021-01-01 RX ORDER — AMLODIPINE BESYLATE 10 MG/1
10 TABLET ORAL DAILY
Status: DISCONTINUED | OUTPATIENT
Start: 2021-01-01 | End: 2021-01-01 | Stop reason: HOSPADM

## 2021-01-01 RX ORDER — POLYETHYLENE GLYCOL 3350 17 G/17G
17 POWDER, FOR SOLUTION ORAL DAILY
Status: DISCONTINUED | OUTPATIENT
Start: 2021-01-01 | End: 2021-01-01 | Stop reason: HOSPADM

## 2021-01-01 RX ORDER — DEXTROSE MONOHYDRATE 25 G/50ML
25 INJECTION, SOLUTION INTRAVENOUS
Status: DISCONTINUED | OUTPATIENT
Start: 2021-01-01 | End: 2021-01-01 | Stop reason: HOSPADM

## 2021-01-01 RX ORDER — ONDANSETRON 4 MG/1
4 TABLET, FILM COATED ORAL EVERY 6 HOURS PRN
Status: DISCONTINUED | OUTPATIENT
Start: 2021-01-01 | End: 2021-01-01 | Stop reason: HOSPADM

## 2021-01-01 RX ORDER — TRAMADOL HYDROCHLORIDE 50 MG/1
50 TABLET ORAL 3 TIMES DAILY
Status: ON HOLD | COMMUNITY
End: 2022-01-01 | Stop reason: SDUPTHER

## 2021-01-01 RX ORDER — INSULIN GLARGINE 100 [IU]/ML
30 INJECTION, SOLUTION SUBCUTANEOUS EVERY 12 HOURS SCHEDULED
Status: DISCONTINUED | OUTPATIENT
Start: 2021-01-01 | End: 2021-01-01

## 2021-01-01 RX ORDER — CLONIDINE HYDROCHLORIDE 0.1 MG/1
0.1 TABLET ORAL 2 TIMES DAILY
Status: DISCONTINUED | OUTPATIENT
Start: 2021-01-01 | End: 2021-01-01 | Stop reason: HOSPADM

## 2021-01-01 RX ORDER — BISACODYL 10 MG
10 SUPPOSITORY, RECTAL RECTAL DAILY
Status: DISCONTINUED | OUTPATIENT
Start: 2021-01-01 | End: 2021-01-01 | Stop reason: HOSPADM

## 2021-01-01 RX ORDER — ACETAMINOPHEN 160 MG/5ML
650 SOLUTION ORAL EVERY 4 HOURS PRN
Status: DISCONTINUED | OUTPATIENT
Start: 2021-01-01 | End: 2021-01-01 | Stop reason: HOSPADM

## 2021-01-01 RX ORDER — INSULIN LISPRO 100 [IU]/ML
0-14 INJECTION, SOLUTION INTRAVENOUS; SUBCUTANEOUS
Status: DISCONTINUED | OUTPATIENT
Start: 2021-01-01 | End: 2021-01-01 | Stop reason: HOSPADM

## 2021-01-01 RX ORDER — BISACODYL 10 MG
10 SUPPOSITORY, RECTAL RECTAL DAILY PRN
Status: DISCONTINUED | OUTPATIENT
Start: 2021-01-01 | End: 2021-01-01 | Stop reason: HOSPADM

## 2021-01-01 RX ORDER — AMOXICILLIN 250 MG
2 CAPSULE ORAL 2 TIMES DAILY
Status: DISCONTINUED | OUTPATIENT
Start: 2021-01-01 | End: 2021-01-01 | Stop reason: HOSPADM

## 2021-01-01 RX ORDER — INSULIN GLARGINE 100 [IU]/ML
25 INJECTION, SOLUTION SUBCUTANEOUS EVERY 12 HOURS SCHEDULED
Status: DISCONTINUED | OUTPATIENT
Start: 2021-01-01 | End: 2021-01-01

## 2021-01-01 RX ORDER — FUROSEMIDE 20 MG/1
20 TABLET ORAL DAILY
Qty: 30 TABLET | Refills: 0 | Status: SHIPPED | OUTPATIENT
Start: 2021-01-01

## 2021-01-01 RX ORDER — INSULIN LISPRO 100 [IU]/ML
8 INJECTION, SOLUTION INTRAVENOUS; SUBCUTANEOUS
Status: DISCONTINUED | OUTPATIENT
Start: 2021-01-01 | End: 2021-01-01 | Stop reason: HOSPADM

## 2021-01-01 RX ORDER — ACETAMINOPHEN 650 MG/1
650 SUPPOSITORY RECTAL EVERY 4 HOURS PRN
Status: DISCONTINUED | OUTPATIENT
Start: 2021-01-01 | End: 2021-01-01 | Stop reason: HOSPADM

## 2021-01-01 RX ORDER — ROSUVASTATIN CALCIUM 20 MG/1
20 TABLET, COATED ORAL NIGHTLY
Status: DISCONTINUED | OUTPATIENT
Start: 2021-01-01 | End: 2021-01-01 | Stop reason: HOSPADM

## 2021-01-01 RX ORDER — FUROSEMIDE 20 MG/1
20 TABLET ORAL DAILY
Status: DISCONTINUED | OUTPATIENT
Start: 2021-01-01 | End: 2021-01-01 | Stop reason: HOSPADM

## 2021-01-01 RX ORDER — ONDANSETRON 4 MG/1
4 TABLET, FILM COATED ORAL EVERY 8 HOURS PRN
COMMUNITY

## 2021-01-01 RX ORDER — MUSCLE RUB CREAM 100; 150 MG/G; MG/G
1 CREAM TOPICAL EVERY 4 HOURS PRN
Status: DISCONTINUED | OUTPATIENT
Start: 2021-01-01 | End: 2021-01-01 | Stop reason: HOSPADM

## 2021-01-01 RX ORDER — ROSUVASTATIN CALCIUM 20 MG/1
20 TABLET, COATED ORAL NIGHTLY
COMMUNITY

## 2021-01-01 RX ORDER — POLYETHYLENE GLYCOL 3350 17 G/17G
17 POWDER, FOR SOLUTION ORAL DAILY
COMMUNITY

## 2021-01-01 RX ORDER — CALCIUM CARBONATE 200(500)MG
2 TABLET,CHEWABLE ORAL 2 TIMES DAILY PRN
Status: DISCONTINUED | OUTPATIENT
Start: 2021-01-01 | End: 2021-01-01 | Stop reason: HOSPADM

## 2021-01-01 RX ORDER — MELATONIN
5000 DAILY
Status: DISCONTINUED | OUTPATIENT
Start: 2021-01-01 | End: 2021-01-01

## 2021-01-01 RX ORDER — SODIUM CHLORIDE 0.9 % (FLUSH) 0.9 %
10 SYRINGE (ML) INJECTION AS NEEDED
Status: DISCONTINUED | OUTPATIENT
Start: 2021-01-01 | End: 2021-01-01 | Stop reason: HOSPADM

## 2021-01-01 RX ORDER — LEVOTHYROXINE SODIUM 0.1 MG/1
100 TABLET ORAL
Status: DISCONTINUED | OUTPATIENT
Start: 2021-01-01 | End: 2021-01-01 | Stop reason: HOSPADM

## 2021-01-01 RX ORDER — MELATONIN
5000 DAILY
Status: DISCONTINUED | OUTPATIENT
Start: 2021-01-01 | End: 2021-01-01 | Stop reason: HOSPADM

## 2021-01-01 RX ORDER — SODIUM CHLORIDE 0.9 % (FLUSH) 0.9 %
10 SYRINGE (ML) INJECTION EVERY 12 HOURS SCHEDULED
Status: DISCONTINUED | OUTPATIENT
Start: 2021-01-01 | End: 2021-01-01 | Stop reason: HOSPADM

## 2021-01-01 RX ORDER — ACETAMINOPHEN 325 MG/1
650 TABLET ORAL EVERY 4 HOURS PRN
Status: DISCONTINUED | OUTPATIENT
Start: 2021-01-01 | End: 2021-01-01 | Stop reason: HOSPADM

## 2021-01-01 RX ORDER — AMLODIPINE BESYLATE 10 MG/1
10 TABLET ORAL DAILY
COMMUNITY

## 2021-01-01 RX ORDER — POLYETHYLENE GLYCOL 3350 17 G/17G
17 POWDER, FOR SOLUTION ORAL DAILY PRN
Status: DISCONTINUED | OUTPATIENT
Start: 2021-01-01 | End: 2021-01-01 | Stop reason: HOSPADM

## 2021-01-01 RX ORDER — CLONIDINE HYDROCHLORIDE 0.1 MG/1
0.1 TABLET ORAL 2 TIMES DAILY
COMMUNITY

## 2021-01-01 RX ORDER — INSULIN GLARGINE 100 [IU]/ML
26 INJECTION, SOLUTION SUBCUTANEOUS 2 TIMES DAILY
Status: ON HOLD | COMMUNITY
End: 2021-01-01 | Stop reason: SDUPTHER

## 2021-01-01 RX ORDER — DULOXETIN HYDROCHLORIDE 60 MG/1
60 CAPSULE, DELAYED RELEASE ORAL 2 TIMES DAILY
Status: DISCONTINUED | OUTPATIENT
Start: 2021-01-01 | End: 2021-01-01 | Stop reason: HOSPADM

## 2021-01-01 RX ORDER — TRAMADOL HYDROCHLORIDE 50 MG/1
50 TABLET ORAL EVERY 6 HOURS PRN
Status: DISCONTINUED | OUTPATIENT
Start: 2021-01-01 | End: 2021-01-01 | Stop reason: HOSPADM

## 2021-01-01 RX ORDER — LANOLIN ALCOHOL/MO/W.PET/CERES
1000 CREAM (GRAM) TOPICAL 3 TIMES WEEKLY
COMMUNITY

## 2021-01-01 RX ORDER — INSULIN LISPRO 100 [IU]/ML
5 INJECTION, SOLUTION INTRAVENOUS; SUBCUTANEOUS
Status: DISCONTINUED | OUTPATIENT
Start: 2021-01-01 | End: 2021-01-01

## 2021-01-01 RX ORDER — NICOTINE POLACRILEX 4 MG
15 LOZENGE BUCCAL
Status: DISCONTINUED | OUTPATIENT
Start: 2021-01-01 | End: 2021-01-01 | Stop reason: HOSPADM

## 2021-01-01 RX ORDER — INSULIN GLARGINE 100 [IU]/ML
35 INJECTION, SOLUTION SUBCUTANEOUS EVERY 12 HOURS SCHEDULED
Status: DISCONTINUED | OUTPATIENT
Start: 2021-01-01 | End: 2021-01-01 | Stop reason: HOSPADM

## 2021-01-01 RX ORDER — ONDANSETRON 2 MG/ML
4 INJECTION INTRAMUSCULAR; INTRAVENOUS EVERY 6 HOURS PRN
Status: DISCONTINUED | OUTPATIENT
Start: 2021-01-01 | End: 2021-01-01 | Stop reason: HOSPADM

## 2021-01-01 RX ORDER — INSULIN GLARGINE 100 [IU]/ML
30 INJECTION, SOLUTION SUBCUTANEOUS 2 TIMES DAILY
Qty: 100 ML | Refills: 12 | Status: ON HOLD | OUTPATIENT
Start: 2021-01-01 | End: 2022-01-01

## 2021-01-01 RX ORDER — INSULIN LISPRO 100 [IU]/ML
0-14 INJECTION, SOLUTION INTRAVENOUS; SUBCUTANEOUS
Qty: 100 ML | Refills: 12 | Status: SHIPPED | OUTPATIENT
Start: 2021-01-01

## 2021-01-01 RX ORDER — CHOLECALCIFEROL (VITAMIN D3) 125 MCG
1000 CAPSULE ORAL DAILY
Status: DISCONTINUED | OUTPATIENT
Start: 2021-01-01 | End: 2021-01-01 | Stop reason: HOSPADM

## 2021-01-01 RX ORDER — BISACODYL 10 MG
10 SUPPOSITORY, RECTAL RECTAL DAILY PRN
COMMUNITY

## 2021-01-01 RX ADMIN — VANCOMYCIN HYDROCHLORIDE 1500 MG: 10 INJECTION, POWDER, LYOPHILIZED, FOR SOLUTION INTRAVENOUS at 12:45

## 2021-01-01 RX ADMIN — APIXABAN 5 MG: 5 TABLET, FILM COATED ORAL at 20:21

## 2021-01-01 RX ADMIN — SODIUM CHLORIDE, PRESERVATIVE FREE 10 ML: 5 INJECTION INTRAVENOUS at 20:49

## 2021-01-01 RX ADMIN — APIXABAN 5 MG: 5 TABLET, FILM COATED ORAL at 09:01

## 2021-01-01 RX ADMIN — CLONIDINE HYDROCHLORIDE 0.1 MG: 0.1 TABLET ORAL at 09:53

## 2021-01-01 RX ADMIN — DOCUSATE SODIUM 50MG AND SENNOSIDES 8.6MG 2 TABLET: 8.6; 5 TABLET, FILM COATED ORAL at 20:22

## 2021-01-01 RX ADMIN — LINAGLIPTIN 5 MG: 5 TABLET, FILM COATED ORAL at 10:10

## 2021-01-01 RX ADMIN — ROSUVASTATIN CALCIUM 20 MG: 20 TABLET, FILM COATED ORAL at 20:49

## 2021-01-01 RX ADMIN — Medication 1000 MCG: at 10:10

## 2021-01-01 RX ADMIN — LEVOTHYROXINE SODIUM 100 MCG: 0.1 TABLET ORAL at 05:52

## 2021-01-01 RX ADMIN — CLONIDINE HYDROCHLORIDE 0.1 MG: 0.1 TABLET ORAL at 20:22

## 2021-01-01 RX ADMIN — DULOXETINE HYDROCHLORIDE 60 MG: 60 CAPSULE, DELAYED RELEASE ORAL at 20:21

## 2021-01-01 RX ADMIN — INSULIN GLARGINE 35 UNITS: 100 INJECTION, SOLUTION SUBCUTANEOUS at 20:23

## 2021-01-01 RX ADMIN — DULOXETINE HYDROCHLORIDE 60 MG: 60 CAPSULE, DELAYED RELEASE ORAL at 21:12

## 2021-01-01 RX ADMIN — CLONIDINE HYDROCHLORIDE 0.1 MG: 0.1 TABLET ORAL at 09:01

## 2021-01-01 RX ADMIN — MENTHOL, METHYL SALICYLATE 1 APPLICATION: 10; 15 CREAM TOPICAL at 19:05

## 2021-01-01 RX ADMIN — INSULIN LISPRO 8 UNITS: 100 INJECTION, SOLUTION INTRAVENOUS; SUBCUTANEOUS at 13:19

## 2021-01-01 RX ADMIN — POLYETHYLENE GLYCOL 3350 17 G: 17 POWDER, FOR SOLUTION ORAL at 09:19

## 2021-01-01 RX ADMIN — APIXABAN 5 MG: 5 TABLET, FILM COATED ORAL at 20:49

## 2021-01-01 RX ADMIN — INSULIN LISPRO 8 UNITS: 100 INJECTION, SOLUTION INTRAVENOUS; SUBCUTANEOUS at 12:10

## 2021-01-01 RX ADMIN — INSULIN LISPRO 5 UNITS: 100 INJECTION, SOLUTION INTRAVENOUS; SUBCUTANEOUS at 13:20

## 2021-01-01 RX ADMIN — SODIUM CHLORIDE, PRESERVATIVE FREE 10 ML: 5 INJECTION INTRAVENOUS at 10:11

## 2021-01-01 RX ADMIN — VANCOMYCIN HYDROCHLORIDE 1250 MG: 10 INJECTION, POWDER, LYOPHILIZED, FOR SOLUTION INTRAVENOUS at 13:50

## 2021-01-01 RX ADMIN — TRAMADOL HYDROCHLORIDE 50 MG: 50 TABLET ORAL at 17:52

## 2021-01-01 RX ADMIN — MENTHOL, METHYL SALICYLATE 1 APPLICATION: 10; 15 CREAM TOPICAL at 20:22

## 2021-01-01 RX ADMIN — CEFTRIAXONE 1 G: 1 INJECTION, POWDER, FOR SOLUTION INTRAMUSCULAR; INTRAVENOUS at 13:04

## 2021-01-01 RX ADMIN — ROSUVASTATIN CALCIUM 20 MG: 20 TABLET, FILM COATED ORAL at 20:22

## 2021-01-01 RX ADMIN — Medication 1000 MCG: at 09:17

## 2021-01-01 RX ADMIN — INSULIN LISPRO 8 UNITS: 100 INJECTION, SOLUTION INTRAVENOUS; SUBCUTANEOUS at 13:05

## 2021-01-01 RX ADMIN — DULOXETINE HYDROCHLORIDE 60 MG: 60 CAPSULE, DELAYED RELEASE ORAL at 20:49

## 2021-01-01 RX ADMIN — INSULIN LISPRO 5 UNITS: 100 INJECTION, SOLUTION INTRAVENOUS; SUBCUTANEOUS at 18:01

## 2021-01-01 RX ADMIN — Medication 1000 MCG: at 09:03

## 2021-01-01 RX ADMIN — Medication 1000 MCG: at 09:54

## 2021-01-01 RX ADMIN — TRAMADOL HYDROCHLORIDE 50 MG: 50 TABLET ORAL at 13:57

## 2021-01-01 RX ADMIN — TRAMADOL HYDROCHLORIDE 50 MG: 50 TABLET ORAL at 09:55

## 2021-01-01 RX ADMIN — DOCUSATE SODIUM 50MG AND SENNOSIDES 8.6MG 2 TABLET: 8.6; 5 TABLET, FILM COATED ORAL at 20:21

## 2021-01-01 RX ADMIN — INSULIN LISPRO 8 UNITS: 100 INJECTION, SOLUTION INTRAVENOUS; SUBCUTANEOUS at 09:18

## 2021-01-01 RX ADMIN — INSULIN LISPRO 5 UNITS: 100 INJECTION, SOLUTION INTRAVENOUS; SUBCUTANEOUS at 17:44

## 2021-01-01 RX ADMIN — LINAGLIPTIN 5 MG: 5 TABLET, FILM COATED ORAL at 09:01

## 2021-01-01 RX ADMIN — CLONIDINE HYDROCHLORIDE 0.1 MG: 0.1 TABLET ORAL at 20:21

## 2021-01-01 RX ADMIN — DULOXETINE HYDROCHLORIDE 60 MG: 60 CAPSULE, DELAYED RELEASE ORAL at 09:18

## 2021-01-01 RX ADMIN — Medication 5000 UNITS: at 09:04

## 2021-01-01 RX ADMIN — TRAMADOL HYDROCHLORIDE 50 MG: 50 TABLET ORAL at 01:06

## 2021-01-01 RX ADMIN — INSULIN GLARGINE 25 UNITS: 100 INJECTION, SOLUTION SUBCUTANEOUS at 21:11

## 2021-01-01 RX ADMIN — INSULIN LISPRO 5 UNITS: 100 INJECTION, SOLUTION INTRAVENOUS; SUBCUTANEOUS at 17:41

## 2021-01-01 RX ADMIN — INSULIN LISPRO 5 UNITS: 100 INJECTION, SOLUTION INTRAVENOUS; SUBCUTANEOUS at 09:19

## 2021-01-01 RX ADMIN — SODIUM CHLORIDE 1000 ML: 9 INJECTION, SOLUTION INTRAVENOUS at 13:05

## 2021-01-01 RX ADMIN — Medication 5000 UNITS: at 21:29

## 2021-01-01 RX ADMIN — INSULIN LISPRO 3 UNITS: 100 INJECTION, SOLUTION INTRAVENOUS; SUBCUTANEOUS at 13:05

## 2021-01-01 RX ADMIN — DOCUSATE SODIUM 50MG AND SENNOSIDES 8.6MG 2 TABLET: 8.6; 5 TABLET, FILM COATED ORAL at 14:36

## 2021-01-01 RX ADMIN — LEVOTHYROXINE SODIUM 100 MCG: 0.1 TABLET ORAL at 05:42

## 2021-01-01 RX ADMIN — TRAMADOL HYDROCHLORIDE 50 MG: 50 TABLET ORAL at 00:26

## 2021-01-01 RX ADMIN — AMLODIPINE BESYLATE 10 MG: 10 TABLET ORAL at 09:05

## 2021-01-01 RX ADMIN — AMLODIPINE BESYLATE 10 MG: 10 TABLET ORAL at 09:54

## 2021-01-01 RX ADMIN — INSULIN LISPRO 8 UNITS: 100 INJECTION, SOLUTION INTRAVENOUS; SUBCUTANEOUS at 17:22

## 2021-01-01 RX ADMIN — INSULIN GLARGINE 30 UNITS: 100 INJECTION, SOLUTION SUBCUTANEOUS at 09:18

## 2021-01-01 RX ADMIN — INSULIN LISPRO 5 UNITS: 100 INJECTION, SOLUTION INTRAVENOUS; SUBCUTANEOUS at 18:52

## 2021-01-01 RX ADMIN — SODIUM CHLORIDE, PRESERVATIVE FREE 10 ML: 5 INJECTION INTRAVENOUS at 09:03

## 2021-01-01 RX ADMIN — LEVOTHYROXINE SODIUM 100 MCG: 0.1 TABLET ORAL at 05:38

## 2021-01-01 RX ADMIN — DOCUSATE SODIUM 50MG AND SENNOSIDES 8.6MG 2 TABLET: 8.6; 5 TABLET, FILM COATED ORAL at 09:17

## 2021-01-01 RX ADMIN — DOCUSATE SODIUM 50MG AND SENNOSIDES 8.6MG 2 TABLET: 8.6; 5 TABLET, FILM COATED ORAL at 13:14

## 2021-01-01 RX ADMIN — APIXABAN 5 MG: 5 TABLET, FILM COATED ORAL at 21:29

## 2021-01-01 RX ADMIN — MENTHOL, METHYL SALICYLATE 1 APPLICATION: 10; 15 CREAM TOPICAL at 20:21

## 2021-01-01 RX ADMIN — CLONIDINE HYDROCHLORIDE 0.1 MG: 0.1 TABLET ORAL at 09:18

## 2021-01-01 RX ADMIN — ROSUVASTATIN CALCIUM 20 MG: 20 TABLET, FILM COATED ORAL at 21:12

## 2021-01-01 RX ADMIN — Medication 1000 MCG: at 09:00

## 2021-01-01 RX ADMIN — DULOXETINE HYDROCHLORIDE 60 MG: 60 CAPSULE, DELAYED RELEASE ORAL at 09:54

## 2021-01-01 RX ADMIN — INSULIN LISPRO 8 UNITS: 100 INJECTION, SOLUTION INTRAVENOUS; SUBCUTANEOUS at 17:44

## 2021-01-01 RX ADMIN — DULOXETINE HYDROCHLORIDE 60 MG: 60 CAPSULE, DELAYED RELEASE ORAL at 20:22

## 2021-01-01 RX ADMIN — INSULIN LISPRO 8 UNITS: 100 INJECTION, SOLUTION INTRAVENOUS; SUBCUTANEOUS at 17:52

## 2021-01-01 RX ADMIN — LEVOTHYROXINE SODIUM 100 MCG: 0.1 TABLET ORAL at 05:22

## 2021-01-01 RX ADMIN — SODIUM CHLORIDE, PRESERVATIVE FREE 10 ML: 5 INJECTION INTRAVENOUS at 20:23

## 2021-01-01 RX ADMIN — INSULIN LISPRO 3 UNITS: 100 INJECTION, SOLUTION INTRAVENOUS; SUBCUTANEOUS at 17:21

## 2021-01-01 RX ADMIN — AMLODIPINE BESYLATE 10 MG: 10 TABLET ORAL at 10:09

## 2021-01-01 RX ADMIN — ACETAMINOPHEN 650 MG: 325 TABLET, FILM COATED ORAL at 05:52

## 2021-01-01 RX ADMIN — INSULIN GLARGINE 35 UNITS: 100 INJECTION, SOLUTION SUBCUTANEOUS at 10:12

## 2021-01-01 RX ADMIN — SODIUM CHLORIDE, PRESERVATIVE FREE 10 ML: 5 INJECTION INTRAVENOUS at 09:55

## 2021-01-01 RX ADMIN — INSULIN LISPRO 3 UNITS: 100 INJECTION, SOLUTION INTRAVENOUS; SUBCUTANEOUS at 09:02

## 2021-01-01 RX ADMIN — LEVOTHYROXINE SODIUM 100 MCG: 0.1 TABLET ORAL at 05:41

## 2021-01-01 RX ADMIN — LINAGLIPTIN 5 MG: 5 TABLET, FILM COATED ORAL at 18:52

## 2021-01-01 RX ADMIN — INSULIN LISPRO 8 UNITS: 100 INJECTION, SOLUTION INTRAVENOUS; SUBCUTANEOUS at 17:40

## 2021-01-01 RX ADMIN — CEFTRIAXONE 1 G: 1 INJECTION, POWDER, FOR SOLUTION INTRAMUSCULAR; INTRAVENOUS at 14:36

## 2021-01-01 RX ADMIN — AMLODIPINE BESYLATE 10 MG: 10 TABLET ORAL at 09:18

## 2021-01-01 RX ADMIN — Medication 5000 UNITS: at 10:10

## 2021-01-01 RX ADMIN — Medication 1000 MCG: at 18:52

## 2021-01-01 RX ADMIN — CLONIDINE HYDROCHLORIDE 0.1 MG: 0.1 TABLET ORAL at 10:10

## 2021-01-01 RX ADMIN — SODIUM CHLORIDE, PRESERVATIVE FREE 10 ML: 5 INJECTION INTRAVENOUS at 21:29

## 2021-01-01 RX ADMIN — INSULIN GLARGINE 25 UNITS: 100 INJECTION, SOLUTION SUBCUTANEOUS at 09:57

## 2021-01-01 RX ADMIN — INSULIN GLARGINE 25 UNITS: 100 INJECTION, SOLUTION SUBCUTANEOUS at 09:07

## 2021-01-01 RX ADMIN — DULOXETINE HYDROCHLORIDE 60 MG: 60 CAPSULE, DELAYED RELEASE ORAL at 21:29

## 2021-01-01 RX ADMIN — SODIUM CHLORIDE, PRESERVATIVE FREE 10 ML: 5 INJECTION INTRAVENOUS at 20:21

## 2021-01-01 RX ADMIN — CLONIDINE HYDROCHLORIDE 0.1 MG: 0.1 TABLET ORAL at 21:12

## 2021-01-01 RX ADMIN — APIXABAN 5 MG: 5 TABLET, FILM COATED ORAL at 09:53

## 2021-01-01 RX ADMIN — APIXABAN 5 MG: 5 TABLET, FILM COATED ORAL at 09:18

## 2021-01-01 RX ADMIN — POLYETHYLENE GLYCOL 3350 17 G: 17 POWDER, FOR SOLUTION ORAL at 13:19

## 2021-01-01 RX ADMIN — MENTHOL, METHYL SALICYLATE 1 APPLICATION: 10; 15 CREAM TOPICAL at 05:21

## 2021-01-01 RX ADMIN — INSULIN LISPRO 3 UNITS: 100 INJECTION, SOLUTION INTRAVENOUS; SUBCUTANEOUS at 06:38

## 2021-01-01 RX ADMIN — LINAGLIPTIN 5 MG: 5 TABLET, FILM COATED ORAL at 09:04

## 2021-01-01 RX ADMIN — INSULIN LISPRO 8 UNITS: 100 INJECTION, SOLUTION INTRAVENOUS; SUBCUTANEOUS at 09:01

## 2021-01-01 RX ADMIN — DULOXETINE HYDROCHLORIDE 60 MG: 60 CAPSULE, DELAYED RELEASE ORAL at 09:01

## 2021-01-01 RX ADMIN — LINAGLIPTIN 5 MG: 5 TABLET, FILM COATED ORAL at 09:18

## 2021-01-01 RX ADMIN — VANCOMYCIN HYDROCHLORIDE 2500 MG: 10 INJECTION, POWDER, LYOPHILIZED, FOR SOLUTION INTRAVENOUS at 15:35

## 2021-01-01 RX ADMIN — INSULIN LISPRO 3 UNITS: 100 INJECTION, SOLUTION INTRAVENOUS; SUBCUTANEOUS at 10:20

## 2021-01-01 RX ADMIN — SODIUM CHLORIDE, PRESERVATIVE FREE 10 ML: 5 INJECTION INTRAVENOUS at 09:06

## 2021-01-01 RX ADMIN — MENTHOL, METHYL SALICYLATE 1 APPLICATION: 10; 15 CREAM TOPICAL at 01:06

## 2021-01-01 RX ADMIN — DULOXETINE HYDROCHLORIDE 60 MG: 60 CAPSULE, DELAYED RELEASE ORAL at 09:05

## 2021-01-01 RX ADMIN — POLYETHYLENE GLYCOL 3350 17 G: 17 POWDER, FOR SOLUTION ORAL at 09:00

## 2021-01-01 RX ADMIN — INSULIN LISPRO 5 UNITS: 100 INJECTION, SOLUTION INTRAVENOUS; SUBCUTANEOUS at 10:03

## 2021-01-01 RX ADMIN — Medication 5000 UNITS: at 09:53

## 2021-01-01 RX ADMIN — INSULIN GLARGINE 35 UNITS: 100 INJECTION, SOLUTION SUBCUTANEOUS at 09:01

## 2021-01-01 RX ADMIN — SODIUM CHLORIDE, PRESERVATIVE FREE 10 ML: 5 INJECTION INTRAVENOUS at 21:12

## 2021-01-01 RX ADMIN — INSULIN LISPRO 8 UNITS: 100 INJECTION, SOLUTION INTRAVENOUS; SUBCUTANEOUS at 10:21

## 2021-01-01 RX ADMIN — APIXABAN 5 MG: 5 TABLET, FILM COATED ORAL at 09:02

## 2021-01-01 RX ADMIN — APIXABAN 5 MG: 5 TABLET, FILM COATED ORAL at 21:12

## 2021-01-01 RX ADMIN — TRAMADOL HYDROCHLORIDE 50 MG: 50 TABLET ORAL at 05:21

## 2021-01-01 RX ADMIN — INSULIN LISPRO 5 UNITS: 100 INJECTION, SOLUTION INTRAVENOUS; SUBCUTANEOUS at 13:14

## 2021-01-01 RX ADMIN — INSULIN LISPRO 5 UNITS: 100 INJECTION, SOLUTION INTRAVENOUS; SUBCUTANEOUS at 14:36

## 2021-01-01 RX ADMIN — CLONIDINE HYDROCHLORIDE 0.1 MG: 0.1 TABLET ORAL at 21:29

## 2021-01-01 RX ADMIN — TRAMADOL HYDROCHLORIDE 50 MG: 50 TABLET ORAL at 09:08

## 2021-01-01 RX ADMIN — Medication 10 MG: at 09:19

## 2021-01-01 RX ADMIN — AMLODIPINE BESYLATE 10 MG: 10 TABLET ORAL at 09:01

## 2021-01-01 RX ADMIN — LINAGLIPTIN 5 MG: 5 TABLET, FILM COATED ORAL at 09:55

## 2021-01-01 RX ADMIN — APIXABAN 5 MG: 5 TABLET, FILM COATED ORAL at 20:22

## 2021-01-01 RX ADMIN — DOCUSATE SODIUM 50MG AND SENNOSIDES 8.6MG 2 TABLET: 8.6; 5 TABLET, FILM COATED ORAL at 21:29

## 2021-01-01 RX ADMIN — FUROSEMIDE 20 MG: 20 TABLET ORAL at 13:00

## 2021-01-01 RX ADMIN — APIXABAN 5 MG: 5 TABLET, FILM COATED ORAL at 10:10

## 2021-01-01 RX ADMIN — CLONIDINE HYDROCHLORIDE 0.1 MG: 0.1 TABLET ORAL at 09:04

## 2021-01-01 RX ADMIN — INSULIN GLARGINE 35 UNITS: 100 INJECTION, SOLUTION SUBCUTANEOUS at 20:21

## 2021-01-01 RX ADMIN — SODIUM CHLORIDE, PRESERVATIVE FREE 10 ML: 5 INJECTION INTRAVENOUS at 09:29

## 2021-01-01 RX ADMIN — ACETAMINOPHEN 650 MG: 325 TABLET, FILM COATED ORAL at 02:48

## 2021-01-01 RX ADMIN — FUROSEMIDE 20 MG: 20 TABLET ORAL at 10:10

## 2021-01-01 RX ADMIN — Medication 10 MG: at 09:08

## 2021-01-01 RX ADMIN — MENTHOL, METHYL SALICYLATE 1 APPLICATION: 10; 15 CREAM TOPICAL at 06:39

## 2021-01-01 RX ADMIN — CLONIDINE HYDROCHLORIDE 0.1 MG: 0.1 TABLET ORAL at 20:49

## 2021-01-01 RX ADMIN — VANCOMYCIN HYDROCHLORIDE 1500 MG: 10 INJECTION, POWDER, LYOPHILIZED, FOR SOLUTION INTRAVENOUS at 12:10

## 2021-01-01 RX ADMIN — DULOXETINE HYDROCHLORIDE 60 MG: 60 CAPSULE, DELAYED RELEASE ORAL at 10:09

## 2021-01-01 RX ADMIN — ROSUVASTATIN CALCIUM 20 MG: 20 TABLET, FILM COATED ORAL at 21:29

## 2021-01-01 RX ADMIN — TRAMADOL HYDROCHLORIDE 50 MG: 50 TABLET ORAL at 17:40

## 2021-01-01 RX ADMIN — INSULIN LISPRO 8 UNITS: 100 INJECTION, SOLUTION INTRAVENOUS; SUBCUTANEOUS at 12:11

## 2021-01-01 RX ADMIN — DOCUSATE SODIUM 50MG AND SENNOSIDES 8.6MG 2 TABLET: 8.6; 5 TABLET, FILM COATED ORAL at 09:00

## 2021-01-01 RX ADMIN — INSULIN LISPRO 8 UNITS: 100 INJECTION, SOLUTION INTRAVENOUS; SUBCUTANEOUS at 13:13

## 2021-01-01 RX ADMIN — Medication 5000 UNITS: at 09:00

## 2021-01-01 RX ADMIN — TRAMADOL HYDROCHLORIDE 50 MG: 50 TABLET ORAL at 19:17

## 2021-01-01 RX ADMIN — Medication 5000 UNITS: at 09:17

## 2021-01-01 RX ADMIN — INSULIN GLARGINE 30 UNITS: 100 INJECTION, SOLUTION SUBCUTANEOUS at 20:50

## 2021-01-01 RX ADMIN — ACETAMINOPHEN 650 MG: 325 TABLET, FILM COATED ORAL at 14:36

## 2021-01-01 RX ADMIN — TRAMADOL HYDROCHLORIDE 50 MG: 50 TABLET ORAL at 01:13

## 2021-01-01 RX ADMIN — ROSUVASTATIN CALCIUM 20 MG: 20 TABLET, FILM COATED ORAL at 20:21

## 2021-04-16 ENCOUNTER — APPOINTMENT (OUTPATIENT)
Dept: GENERAL RADIOLOGY | Facility: HOSPITAL | Age: 86
End: 2021-04-16

## 2021-04-16 ENCOUNTER — HOSPITAL ENCOUNTER (EMERGENCY)
Facility: HOSPITAL | Age: 86
Discharge: NURSING, REHAB OR LONG-TERM CARE | End: 2021-04-16
Attending: EMERGENCY MEDICINE | Admitting: EMERGENCY MEDICINE

## 2021-04-16 VITALS
TEMPERATURE: 98.4 F | HEART RATE: 72 BPM | RESPIRATION RATE: 18 BRPM | SYSTOLIC BLOOD PRESSURE: 170 MMHG | OXYGEN SATURATION: 96 % | DIASTOLIC BLOOD PRESSURE: 80 MMHG

## 2021-04-16 DIAGNOSIS — E88.09 HYPOALBUMINEMIA: ICD-10-CM

## 2021-04-16 DIAGNOSIS — R60.0 BILATERAL LOWER EXTREMITY EDEMA: Primary | ICD-10-CM

## 2021-04-16 LAB
ALBUMIN SERPL-MCNC: 3 G/DL (ref 3.5–5.2)
ALBUMIN/GLOB SERPL: 1.2 G/DL
ALP SERPL-CCNC: 83 U/L (ref 39–117)
ALT SERPL W P-5'-P-CCNC: 12 U/L (ref 1–33)
ANION GAP SERPL CALCULATED.3IONS-SCNC: 5.9 MMOL/L (ref 5–15)
AST SERPL-CCNC: 17 U/L (ref 1–32)
BASOPHILS # BLD AUTO: 0.01 10*3/MM3 (ref 0–0.2)
BASOPHILS NFR BLD AUTO: 0.1 % (ref 0–1.5)
BILIRUB SERPL-MCNC: 0.5 MG/DL (ref 0–1.2)
BUN SERPL-MCNC: 21 MG/DL (ref 8–23)
BUN/CREAT SERPL: 42.9 (ref 7–25)
CALCIUM SPEC-SCNC: 9.7 MG/DL (ref 8.6–10.5)
CHLORIDE SERPL-SCNC: 96 MMOL/L (ref 98–107)
CO2 SERPL-SCNC: 29.1 MMOL/L (ref 22–29)
CREAT SERPL-MCNC: 0.49 MG/DL (ref 0.57–1)
DEPRECATED RDW RBC AUTO: 40.6 FL (ref 37–54)
EOSINOPHIL # BLD AUTO: 0.61 10*3/MM3 (ref 0–0.4)
EOSINOPHIL NFR BLD AUTO: 7 % (ref 0.3–6.2)
ERYTHROCYTE [DISTWIDTH] IN BLOOD BY AUTOMATED COUNT: 12.8 % (ref 12.3–15.4)
GFR SERPL CREATININE-BSD FRML MDRD: 120 ML/MIN/1.73
GLOBULIN UR ELPH-MCNC: 2.6 GM/DL
GLUCOSE BLDC GLUCOMTR-MCNC: 160 MG/DL (ref 70–130)
GLUCOSE SERPL-MCNC: 190 MG/DL (ref 65–99)
HCT VFR BLD AUTO: 41.9 % (ref 34–46.6)
HGB BLD-MCNC: 13.7 G/DL (ref 12–15.9)
HOLD SPECIMEN: NORMAL
HOLD SPECIMEN: NORMAL
IMM GRANULOCYTES # BLD AUTO: 0.06 10*3/MM3 (ref 0–0.05)
IMM GRANULOCYTES NFR BLD AUTO: 0.7 % (ref 0–0.5)
LYMPHOCYTES # BLD AUTO: 0.55 10*3/MM3 (ref 0.7–3.1)
LYMPHOCYTES NFR BLD AUTO: 6.3 % (ref 19.6–45.3)
MCH RBC QN AUTO: 28.7 PG (ref 26.6–33)
MCHC RBC AUTO-ENTMCNC: 32.7 G/DL (ref 31.5–35.7)
MCV RBC AUTO: 87.7 FL (ref 79–97)
MONOCYTES # BLD AUTO: 0.66 10*3/MM3 (ref 0.1–0.9)
MONOCYTES NFR BLD AUTO: 7.6 % (ref 5–12)
NEUTROPHILS NFR BLD AUTO: 6.81 10*3/MM3 (ref 1.7–7)
NEUTROPHILS NFR BLD AUTO: 78.3 % (ref 42.7–76)
NRBC BLD AUTO-RTO: 0 /100 WBC (ref 0–0.2)
NT-PROBNP SERPL-MCNC: 367 PG/ML (ref 0–1800)
PLATELET # BLD AUTO: 226 10*3/MM3 (ref 140–450)
PMV BLD AUTO: 10.7 FL (ref 6–12)
POTASSIUM SERPL-SCNC: 4.2 MMOL/L (ref 3.5–5.2)
PROT SERPL-MCNC: 5.6 G/DL (ref 6–8.5)
QT INTERVAL: 414 MS
RBC # BLD AUTO: 4.78 10*6/MM3 (ref 3.77–5.28)
SODIUM SERPL-SCNC: 131 MMOL/L (ref 136–145)
TROPONIN T SERPL-MCNC: <0.01 NG/ML (ref 0–0.03)
WBC # BLD AUTO: 8.7 10*3/MM3 (ref 3.4–10.8)
WHOLE BLOOD HOLD SPECIMEN: NORMAL
WHOLE BLOOD HOLD SPECIMEN: NORMAL

## 2021-04-16 PROCEDURE — 83880 ASSAY OF NATRIURETIC PEPTIDE: CPT | Performed by: PHYSICIAN ASSISTANT

## 2021-04-16 PROCEDURE — 71045 X-RAY EXAM CHEST 1 VIEW: CPT

## 2021-04-16 PROCEDURE — 99284 EMERGENCY DEPT VISIT MOD MDM: CPT

## 2021-04-16 PROCEDURE — 25010000002 ONDANSETRON PER 1 MG: Performed by: EMERGENCY MEDICINE

## 2021-04-16 PROCEDURE — 84484 ASSAY OF TROPONIN QUANT: CPT | Performed by: PHYSICIAN ASSISTANT

## 2021-04-16 PROCEDURE — 96374 THER/PROPH/DIAG INJ IV PUSH: CPT

## 2021-04-16 PROCEDURE — 93005 ELECTROCARDIOGRAM TRACING: CPT | Performed by: PHYSICIAN ASSISTANT

## 2021-04-16 PROCEDURE — 80053 COMPREHEN METABOLIC PANEL: CPT | Performed by: PHYSICIAN ASSISTANT

## 2021-04-16 PROCEDURE — 82962 GLUCOSE BLOOD TEST: CPT

## 2021-04-16 PROCEDURE — 85025 COMPLETE CBC W/AUTO DIFF WBC: CPT | Performed by: PHYSICIAN ASSISTANT

## 2021-04-16 PROCEDURE — 93010 ELECTROCARDIOGRAM REPORT: CPT | Performed by: INTERNAL MEDICINE

## 2021-04-16 RX ORDER — SODIUM CHLORIDE 0.9 % (FLUSH) 0.9 %
10 SYRINGE (ML) INJECTION AS NEEDED
Status: DISCONTINUED | OUTPATIENT
Start: 2021-04-16 | End: 2021-04-17 | Stop reason: HOSPADM

## 2021-04-16 RX ORDER — ONDANSETRON 4 MG/1
4 TABLET, ORALLY DISINTEGRATING ORAL EVERY 6 HOURS PRN
Qty: 8 TABLET | Refills: 0 | Status: SHIPPED | OUTPATIENT
Start: 2021-04-16 | End: 2021-01-01

## 2021-04-16 RX ORDER — ONDANSETRON 2 MG/ML
4 INJECTION INTRAMUSCULAR; INTRAVENOUS ONCE
Status: COMPLETED | OUTPATIENT
Start: 2021-04-16 | End: 2021-04-16

## 2021-04-16 RX ADMIN — ONDANSETRON 4 MG: 2 INJECTION INTRAMUSCULAR; INTRAVENOUS at 20:23

## 2021-04-16 NOTE — ED NOTES
Report received from SELIN Granados. FM at bedside. Pt awake and in no obvious distress. Labs at bedside. EKG at bedside.     Tammy Espinosa RN  04/16/21 1925

## 2021-04-16 NOTE — ED NOTES
Pt to ER via EMS for possible cellulitis and altered mental status. Pt has BLE swelling and redness to her right axillary/ flank area that is itchy. Pt is on day 3 of antibiotic course for UTI.     Mask placed on patient in triage. Triage staff wore appropriate PPE during interaction with patient.        Laura De La Garza, RN  04/16/21 6751

## 2021-04-16 NOTE — ED PROVIDER NOTES
EMERGENCY DEPARTMENT ENCOUNTER    Room Number:  CHARLENE/CHARLENE  Date seen:  4/17/2021  Time seen: 19:12 EDT  PCP: Elly Mann APRN  Historian: primarily son, also patient      HPI:  Chief Complaint: leg swelling    A complete HPI/ROS/PMH/PSH/SH/FH are unobtainable due to: cognitive disorder    Context: Jolanta Phipps is a 86 y.o. female who presents to the ED for evaluation of gradual onset increasing moderate to severe bilateral lower extremity edema for the last several days.  Nothing seems to make it worse or better.  She is a resident at [facility].  Son is at the bedside provides most the history.  He states she started feeling generally unwell last weekend, was started on Macrobid for urinary tract infection on 4/13/2021.  Has had increasing swelling for the last several days, states he saw her on Tuesday and clipped her toenails and her legs are severely swollen today in comparison to what they were 3 days ago.  She also reportedly has some reddened skin on her right flank and hip that she reports is pruritic.  She denies any pain anywhere and specifically any chest or abdominal pain, any shortness of breath, vomiting or diarrhea.  Patient has been bedbound for the last 5 weeks since suffering a hip fracture.  Patient is on Eliquis for reported history of clots.        PAST MEDICAL HISTORY  Active Ambulatory Problems     Diagnosis Date Noted   • No Active Ambulatory Problems     Resolved Ambulatory Problems     Diagnosis Date Noted   • No Resolved Ambulatory Problems     Past Medical History:   Diagnosis Date   • CHF (congestive heart failure) (CMS/HCC)    • CKD (chronic kidney disease)    • Depression    • Diabetes mellitus (CMS/HCC)    • Fibromyalgia    • Hyperlipidemia    • Hypertension    • Hypothyroidism    • Stroke (CMS/HCC)          PAST SURGICAL HISTORY  History reviewed. No pertinent surgical history.      FAMILY HISTORY  History reviewed. No pertinent family history.      SOCIAL  HISTORY  Social History     Socioeconomic History   • Marital status:      Spouse name: Not on file   • Number of children: Not on file   • Years of education: Not on file   • Highest education level: Not on file         ALLERGIES  Aldactone [spironolactone], Atenolol, Atorvastatin, Cholestyramine, Ezetimibe, Hctz [hydrochlorothiazide], Lisinopril, Losartan, Lyrica [pregabalin], Metformin, Pork-derived products, Sulfa antibiotics, Tapentadol, and Valsartan        REVIEW OF SYSTEMS  Review of Systems     All systems reviewed and negative except for those discussed in HPI.        PHYSICAL EXAM  ED Triage Vitals [04/16/21 1749]   Temp Heart Rate Resp BP SpO2   98.4 °F (36.9 °C) 80 16 116/80 94 %      Temp src Heart Rate Source Patient Position BP Location FiO2 (%)   Tympanic -- -- -- --         GENERAL: not distressed  HENT: atraumatic  EYES: no scleral icterus  CV: regular rhythm, regular rate  RESPIRATORY: normal effort, diffusely diminished, 93% on room air  ABDOMEN: Obese, soft, nontender, nondistended, no guarding or rigidity.  MUSCULOSKELETAL: 3+ pitting edema to the bilateral lower extremities without open skin or significant erythema.  Patient does have a T scope bracde on the right lower extremity  NEURO: alert, moves all extremities, follows commands  SKIN: warm, dry.  She does have some reddened skin to the right flank and right proximal thigh laterally which appears chafed.  It is nontender and there is no drainage fluctuance or induration.    Vital signs and nursing notes reviewed.          LAB RESULTS  Recent Results (from the past 24 hour(s))   Lavender Top    Collection Time: 04/16/21  7:21 PM   Result Value Ref Range    Extra Tube hold for add-on    Gold Top - SST    Collection Time: 04/16/21  7:21 PM   Result Value Ref Range    Extra Tube Hold for add-ons.    CBC Auto Differential    Collection Time: 04/16/21  7:21 PM    Specimen: Blood   Result Value Ref Range    WBC 8.70 3.40 - 10.80 10*3/mm3     RBC 4.78 3.77 - 5.28 10*6/mm3    Hemoglobin 13.7 12.0 - 15.9 g/dL    Hematocrit 41.9 34.0 - 46.6 %    MCV 87.7 79.0 - 97.0 fL    MCH 28.7 26.6 - 33.0 pg    MCHC 32.7 31.5 - 35.7 g/dL    RDW 12.8 12.3 - 15.4 %    RDW-SD 40.6 37.0 - 54.0 fl    MPV 10.7 6.0 - 12.0 fL    Platelets 226 140 - 450 10*3/mm3    Neutrophil % 78.3 (H) 42.7 - 76.0 %    Lymphocyte % 6.3 (L) 19.6 - 45.3 %    Monocyte % 7.6 5.0 - 12.0 %    Eosinophil % 7.0 (H) 0.3 - 6.2 %    Basophil % 0.1 0.0 - 1.5 %    Immature Grans % 0.7 (H) 0.0 - 0.5 %    Neutrophils, Absolute 6.81 1.70 - 7.00 10*3/mm3    Lymphocytes, Absolute 0.55 (L) 0.70 - 3.10 10*3/mm3    Monocytes, Absolute 0.66 0.10 - 0.90 10*3/mm3    Eosinophils, Absolute 0.61 (H) 0.00 - 0.40 10*3/mm3    Basophils, Absolute 0.01 0.00 - 0.20 10*3/mm3    Immature Grans, Absolute 0.06 (H) 0.00 - 0.05 10*3/mm3    nRBC 0.0 0.0 - 0.2 /100 WBC   Comprehensive Metabolic Panel    Collection Time: 04/16/21  7:22 PM    Specimen: Blood   Result Value Ref Range    Glucose 190 (H) 65 - 99 mg/dL    BUN 21 8 - 23 mg/dL    Creatinine 0.49 (L) 0.57 - 1.00 mg/dL    Sodium 131 (L) 136 - 145 mmol/L    Potassium 4.2 3.5 - 5.2 mmol/L    Chloride 96 (L) 98 - 107 mmol/L    CO2 29.1 (H) 22.0 - 29.0 mmol/L    Calcium 9.7 8.6 - 10.5 mg/dL    Total Protein 5.6 (L) 6.0 - 8.5 g/dL    Albumin 3.00 (L) 3.50 - 5.20 g/dL    ALT (SGPT) 12 1 - 33 U/L    AST (SGOT) 17 1 - 32 U/L    Alkaline Phosphatase 83 39 - 117 U/L    Total Bilirubin 0.5 0.0 - 1.2 mg/dL    eGFR Non African Amer 120 >60 mL/min/1.73    Globulin 2.6 gm/dL    A/G Ratio 1.2 g/dL    BUN/Creatinine Ratio 42.9 (H) 7.0 - 25.0    Anion Gap 5.9 5.0 - 15.0 mmol/L   BNP    Collection Time: 04/16/21  7:22 PM    Specimen: Blood   Result Value Ref Range    proBNP 367.0 0.0-1,800.0 pg/mL   Troponin    Collection Time: 04/16/21  7:22 PM    Specimen: Blood   Result Value Ref Range    Troponin T <0.010 0.000 - 0.030 ng/mL   Light Blue Top    Collection Time: 04/16/21  7:22 PM   Result  Value Ref Range    Extra Tube hold for add-on    Green Top (Gel)    Collection Time: 04/16/21  7:22 PM   Result Value Ref Range    Extra Tube Hold for add-ons.    ECG 12 Lead    Collection Time: 04/16/21  7:27 PM   Result Value Ref Range    QT Interval 414 ms   POC Glucose Once    Collection Time: 04/16/21  8:01 PM    Specimen: Blood   Result Value Ref Range    Glucose 160 (H) 70 - 130 mg/dL       Ordered the above labs and independently reviewed the results.        RADIOLOGY  XR Chest 1 View   Final Result          I ordered the above noted radiological studies. Reviewed by me and discussed with radiologist.  See dictation for official radiology interpretation.    PROCEDURES  Procedures        MEDICATIONS GIVEN IN ER  Medications   sodium chloride 0.9 % flush 10 mL (has no administration in time range)   ondansetron (ZOFRAN) injection 4 mg (4 mg Intravenous Given 4/16/21 2023)             PROGRESS AND CONSULTS    DDX includes but not limited to lymphedema, heart failure, cellulitis, venous insufficiency, DVT    ED Course as of Apr 17 0113 Fri Apr 16, 2021 2018 Glucose(!): 160 [KA]   2018 WBC: 8.70 [KA]   2018 Hemoglobin: 13.7 [KA]   2140 Albumin(!): 3.00 [KA]   2151 I reassessed the patient.  Vitals are normal on the monitor.  She is resting comfortably.  She did develop some nausea while here and was given Zofran with good relief.  I will prescribe this for her for recurrent nausea.  She did not have any vomiting.  Work-up unremarkable for heart failure.  Her legs are swollen but there is no other evidence of volume overload.  White blood cell count is normal, she is afebrile, not suggestive of infection.  The redness to her side and hip appears to be chafed skin, it is pruritic and I think moisturization will resolve this.  Additionally her edema is probably multifactorial as she is significantly hypoalbuminemic as well as immobile and bedbound for the last 5 weeks and does take Norvasc for hypertension.  She  may benefit from compression dressings and diuretics and increasing her dietary protein.  These are all items that need to be addressed by the provider at the nursing home.  I discussed this with her and her son and they are agreeable.  She is stable for discharge.    [KA]      ED Course User Index  [KA] Leandra Medrano PA        Reviewed pt's history and workup with Dr. Muñoz.  After a bedside evaluation; they agree with the plan of care      Patient was placed in face mask in first look. Patient was wearing facemask each time I entered the room and throughout our encounter. I wore protective equipment throughout this patient encounter including a face mask, eye shield and gloves. Hand hygiene was performed before donning protective equipment and after removal when leaving the room.        DIAGNOSIS  Final diagnoses:   Bilateral lower extremity edema   Hypoalbuminemia         Follow Up:  Elly Mann, APRN  [address] [facility] [place] [address]  [phone]            RX:     Medication List      New Prescriptions    ondansetron ODT 4 MG disintegrating tablet  Commonly known as: ZOFRAN-ODT  Place 1 tablet on the tongue Every 6 (Six) Hours As Needed for Nausea or Vomiting.           Where to Get Your Medications      You can get these medications from any pharmacy    Bring a paper prescription for each of these medications  · ondansetron ODT 4 MG disintegrating tablet           Latest Documented Vital Signs:  As of 01:13 EDT  BP- 170/80 HR- 72 Temp- 98.4 °F (36.9 °C) (Tympanic) O2 sat- 96%       Leandra Medrano PA  04/17/21 0113

## 2021-04-17 NOTE — ED NOTES
Pt d/c to facility by [facility]. Pt in NAD on d/c.      Billy Munoz, RN  04/16/21 1577

## 2021-04-17 NOTE — ED NOTES
This RN spoke to SELIN Duarte from [facility] regarding pt return to facility. Pt placed in triage bay 1 and in NAD At this time.      Billy Munoz, SELIN  04/16/21 2483

## 2021-04-17 NOTE — DISCHARGE INSTRUCTIONS
Patient should be rechecked by primary care provider within 2 to 3 days.  Edema is likely related to combination of immobility and low albumin, patient needs more protein in her diet.  Additionally, amlodipine which is a blood pressure medicine can cause edema and may need to be reevaluated or replaced.  She may benefit from compression dressings or diuretics and these are all decisions that should be made by her primary care provider.  Return to the emergency department for shortness of breath, chest pain, fever, as needed.  Red patches on the patient's flank and hip appear to be chafed skin and should be moisturized.

## 2021-04-17 NOTE — ED NOTES
Patient was placed in face mask in first look. Patient was wearing facemask when I entered the room and throughout our encounter. I wore full protective equipment throughout this patient encounter including a face mask, and gloves. Hand hygiene was performed before donning protective equipment and after removal when leaving the room.     Tammy Espinosa, RN  04/16/21 0105

## 2021-04-17 NOTE — ED NOTES
Pt c/o being hungry and nauseous. BGL checked, pt reassessed.     Tammy Espinosa, RN  04/16/21 2004

## 2021-04-17 NOTE — ED PROVIDER NOTES
Pt presents to the ED c/o  increasing swelling in the lower extremities for last several days, patient is from nursing home where she has bedbound due to a right femur fracture after the last month and a half.  Some mild erythema to the legs as well.  Was sent here for further evaluation due to the swelling.     On exam,   General: Awake, alert, no acute distress  HEENT: Mucous membranes moist, atraumatic, EOMI  Neck: Full ROM  Pulm: Symmetric chest rise, nonlabored, lungs CTAB  Cardiovascular: Regular rate and rhythm, intact distal pulses, bilateral lower extremity 2+ pitting edema  GI: Soft, nontender, nondistended, no rebound, no guarding, bowel sounds present  MSK: Full ROM, no deformity  Skin: Warm, dry, faint erythema bilateral lower extremities in the dorsal aspect of the foot and anterior lower legs without significant abnormal heat or induration  Neuro: Awake and alert, moving all extremities  Psych: Calm, cooperative      Surgical mask, protective eye goggles, and gloves used during this encounter. Patient in surgical mask.    EKG    EKG Time: 1927  Rhythm/Rate: Sinus rhythm with rate of 70  Left axis deviation with nonspecific IVCD  QRS of 124  No acute ischemic changes  No STEMI     Interpreted Contemporaneously by me, independently viewed  No emergent changes compared to September 2020      Plan:   ED Course as of Apr 16 2307 Fri Apr 16, 2021 2018 Glucose(!): 160 [KA]   2018 WBC: 8.70 [KA]   2018 Hemoglobin: 13.7 [KA]   2140 Albumin(!): 3.00 [KA]   2151 I reassessed the patient.  Vitals are normal on the monitor.  She is resting comfortably.  She did develop some nausea while here and was given Zofran with good relief.  I will prescribe this for her for recurrent nausea.  She did not have any vomiting.  Work-up unremarkable for heart failure.  Her legs are swollen but there is no other evidence of limb overload.  White blood cell count is normal, she is afebrile, not suggestive of infection.  The  redness to her side and hip appears to be chafed skin, it is pruritic and I think moisturization will resolve this.  Additionally her edema is probably multifactorial as she is significantly hypoalbuminemic as well as immobile and bedbound for the last 5 weeks and does take Norvasc for hypertension.  She may benefit from compression dressings and diuretics and increasing her dietary protein.  These are all items that need to be addressed by the provider at the nursing home.  I discussed this with her and her son and they are agreeable.  She is stable for discharge.    [KA]      ED Course User Index  [KA] Leandra Medrano PA     This point I do not suspect cellulitis based on exam or work-up, she is already on anticoagulants and I do not suspect a new acute DVT. I do not suspect CHF exacerbation and no evidence of renal failure as source. Swelling is likely related to bedbound status without significant mobilization, recommend continued PCP follow-up.  ED return for worsening symptoms as needed.     Attestation:  The WILLA and I have discussed this patient's history, physical exam, and treatment plan.  I have reviewed the documentation and personally had a face to face interaction with the patient. I affirm the documentation and agree with the treatment and plan.  The attached note describes my personal findings.          Rohith Muñoz MD  04/16/21 8100

## 2021-04-17 NOTE — ED NOTES
Pt moved to triage bay to wait for EMS transfer home. SELIN Cervantes called report to NH.     Tammy Espinosa RN  04/16/21 9725

## 2021-12-08 PROBLEM — R41.841 COGNITIVE COMMUNICATION DEFICIT: Chronic | Status: ACTIVE | Noted: 2021-01-01

## 2021-12-08 PROBLEM — N39.0 ACUTE UTI: Status: ACTIVE | Noted: 2021-01-01

## 2021-12-08 PROBLEM — E11.65 TYPE 2 DIABETES MELLITUS WITH HYPERGLYCEMIA, WITH LONG-TERM CURRENT USE OF INSULIN (HCC): Chronic | Status: ACTIVE | Noted: 2021-01-01

## 2021-12-08 PROBLEM — G93.41 ACUTE METABOLIC ENCEPHALOPATHY: Status: ACTIVE | Noted: 2021-01-01

## 2021-12-08 PROBLEM — Z79.4 TYPE 2 DIABETES MELLITUS WITH HYPERGLYCEMIA, WITH LONG-TERM CURRENT USE OF INSULIN (HCC): Chronic | Status: ACTIVE | Noted: 2021-01-01

## 2021-12-08 PROBLEM — Z86.73 HISTORY OF RIGHT MCA STROKE: Chronic | Status: ACTIVE | Noted: 2021-01-01

## 2021-12-08 PROBLEM — R19.7 DIARRHEA: Status: ACTIVE | Noted: 2021-01-01

## 2021-12-08 NOTE — ED PROVIDER NOTES
EMERGENCY DEPARTMENT ENCOUNTER    Room Number:  N436/1  Date seen:  12/8/2021  PCP: Elly Mann APRN  Historian: Patient, EMS      HPI:  Chief Complaint: High blood sugar, altered mental status  A complete HPI/ROS/PMH/PSH/SH/FH are unobtainable due to: Nothing  Context: Jolanta Phipps is a 87 y.o. female who presents to the ED c/o high blood sugar and altered mental status.  Patient reportedly has a history of diabetes.  She was reportedly very lethargic this morning and her blood sugar was greater than 300.  The nursing facility staff administered her Lantus with no improvement of her symptoms.  EMS reports that the patient has been awake and alert with them.  She denies any acute complaints.  She reports that she has had a dry cough but she denies chest pain or shortness of air.  She denies fever or chills.  She does not walk at baseline.  She has had a history of previous stroke and she is also been having pain in her right leg and left elbow related to previous injuries.  She denies abdominal pain, nausea, vomiting, diarrhea.            PAST MEDICAL HISTORY  Active Ambulatory Problems     Diagnosis Date Noted   • No Active Ambulatory Problems     Resolved Ambulatory Problems     Diagnosis Date Noted   • No Resolved Ambulatory Problems     Past Medical History:   Diagnosis Date   • CHF (congestive heart failure) (CMS/Formerly [facility])    • CKD (chronic kidney disease)    • Depression    • Diabetes mellitus (CMS/[facility])    • Fibromyalgia    • Hyperlipidemia    • Hypertension    • Hypothyroidism    • Stroke (CMS/Formerly [facility])          PAST SURGICAL HISTORY  No past surgical history on file.      FAMILY HISTORY  No family history on file.      SOCIAL HISTORY  Social History     Socioeconomic History   • Marital status:          ALLERGIES  Aldactone [spironolactone], Atenolol, Atorvastatin, Cholestyramine, Ezetimibe, Hctz [hydrochlorothiazide], Lisinopril, Losartan, Lyrica [pregabalin], Metformin, Pork-derived products, Sulfa  antibiotics, Tapentadol, and Valsartan        REVIEW OF SYSTEMS  Review of Systems   Review of all 14 systems is negative other than stated in the HPI above.      PHYSICAL EXAM  ED Triage Vitals [12/08/21 1105]   Temp Heart Rate Resp BP SpO2   97.8 °F (36.6 °C) 72 20 158/79 94 %      Temp src Heart Rate Source Patient Position BP Location FiO2 (%)   -- Monitor Sitting Right arm --         GENERAL: Awake and alert, no acute distress, chronically ill-appearing  HENT: nares patent  EYES: no scleral icterus, EOMI  CV: regular rhythm, normal rate  RESPIRATORY: normal effort, lungs clear auscultation bilaterally  ABDOMEN: soft, nondistended, nontender throughout  MUSCULOSKELETAL: no deformity, point tenderness of the left elbow with reduced range of motion.  NEURO: alert, oriented to self only.  Speech is fluent the patient is a poor historian.  She is moving her right upper extremity purposefully.  She has reduced range of motion her left upper extremity which she attributes to elbow pain.  She has some resistance against gravity in bilateral lower extremities but cannot keep them off the bed.  Cranial nerves II through XII grossly intact with no apparent facial droop.  Pupils are 2 mm reactive bilaterally.  PSYCH:  calm, cooperative  SKIN: warm, dry    Vital signs and nursing notes reviewed.          LAB RESULTS  Recent Results (from the past 24 hour(s))   POC Glucose Once    Collection Time: 12/08/21 11:11 AM    Specimen: Blood   Result Value Ref Range    Glucose 285 (H) 70 - 130 mg/dL   Comprehensive Metabolic Panel    Collection Time: 12/08/21 11:18 AM    Specimen: Blood   Result Value Ref Range    Glucose 294 (H) 65 - 99 mg/dL    BUN 13 8 - 23 mg/dL    Creatinine 0.74 0.57 - 1.00 mg/dL    Sodium 136 136 - 145 mmol/L    Potassium 4.7 3.5 - 5.2 mmol/L    Chloride 99 98 - 107 mmol/L    CO2 32.1 (H) 22.0 - 29.0 mmol/L    Calcium 10.4 8.6 - 10.5 mg/dL    Total Protein 6.6 6.0 - 8.5 g/dL    Albumin 3.00 (L) 3.50 - 5.20  g/dL    ALT (SGPT) 13 1 - 33 U/L    AST (SGOT) 12 1 - 32 U/L    Alkaline Phosphatase 85 39 - 117 U/L    Total Bilirubin 0.4 0.0 - 1.2 mg/dL    eGFR Non African Amer 74 >60 mL/min/1.73    Globulin 3.6 gm/dL    A/G Ratio 0.8 g/dL    BUN/Creatinine Ratio 17.6 7.0 - 25.0    Anion Gap 4.9 (L) 5.0 - 15.0 mmol/L   CBC Auto Differential    Collection Time: 12/08/21 11:18 AM    Specimen: Blood   Result Value Ref Range    WBC 7.80 3.40 - 10.80 10*3/mm3    RBC 4.71 3.77 - 5.28 10*6/mm3    Hemoglobin 13.2 12.0 - 15.9 g/dL    Hematocrit 42.1 34.0 - 46.6 %    MCV 89.4 79.0 - 97.0 fL    MCH 28.0 26.6 - 33.0 pg    MCHC 31.4 (L) 31.5 - 35.7 g/dL    RDW 13.0 12.3 - 15.4 %    RDW-SD 42.2 37.0 - 54.0 fl    MPV 9.8 6.0 - 12.0 fL    Platelets 250 140 - 450 10*3/mm3    Neutrophil % 50.8 42.7 - 76.0 %    Lymphocyte % 34.5 19.6 - 45.3 %    Monocyte % 9.0 5.0 - 12.0 %    Eosinophil % 5.0 0.3 - 6.2 %    Basophil % 0.4 0.0 - 1.5 %    Immature Grans % 0.3 0.0 - 0.5 %    Neutrophils, Absolute 3.97 1.70 - 7.00 10*3/mm3    Lymphocytes, Absolute 2.69 0.70 - 3.10 10*3/mm3    Monocytes, Absolute 0.70 0.10 - 0.90 10*3/mm3    Eosinophils, Absolute 0.39 0.00 - 0.40 10*3/mm3    Basophils, Absolute 0.03 0.00 - 0.20 10*3/mm3    Immature Grans, Absolute 0.02 0.00 - 0.05 10*3/mm3    nRBC 0.0 0.0 - 0.2 /100 WBC   COVID-19, [facility] CEPHEID/LINDA NP SWAB IN TRANSPORT MEDIA 8-12 HR TAT - Swab, Nasopharynx    Collection Time: 12/08/21 11:19 AM    Specimen: Nasopharynx; Swab   Result Value Ref Range    COVID19 Not Detected Not Detected - Ref. Range   Urinalysis With Microscopic If Indicated (No Culture) - Urine, Catheter    Collection Time: 12/08/21 11:49 AM    Specimen: Urine, Catheter   Result Value Ref Range    Color, UA Yellow Yellow, Straw    Appearance, UA Turbid (A) Clear    pH, UA 5.5 5.0 - 8.0    Specific Gravity, UA 1.015 1.005 - 1.030    Glucose,  mg/dL (Trace) (A) Negative    Ketones, UA Negative Negative    Bilirubin, UA Negative Negative     Blood, UA Large (3+) (A) Negative    Protein, UA >=300 mg/dL (3+) (A) Negative    Leuk Esterase, UA Large (3+) (A) Negative    Nitrite, UA Positive (A) Negative    Urobilinogen, UA 1.0 E.U./dL 0.2 - 1.0 E.U./dL   Urinalysis, Microscopic Only - Urine, Catheter    Collection Time: 12/08/21 11:49 AM    Specimen: Urine, Catheter   Result Value Ref Range    RBC, UA Unable to determine due to loaded field (A) None Seen, 0-2 /HPF    WBC, UA Too Numerous to Count (A) None Seen, 0-2 /HPF    Bacteria, UA Unable to determine due to loaded field (A) None Seen /HPF    Squamous Epithelial Cells, UA Unable to determine due to loaded field (A) None Seen, 0-2 /HPF    Hyaline Casts, UA Unable to determine due to loaded field None Seen /LPF    Methodology Manual Light Microscopy        Ordered the above labs and reviewed the results.        RADIOLOGY  XR Chest 1 View    Result Date: 12/8/2021  XR CHEST 1 VW-  HISTORY:  Cough, weakness.  COMPARISON:  Chest radiograph 04/16/2021.  FINDINGS:  A single portable view of the chest was obtained. The cardiac silhouette is normal in size. There is calcific aortic atherosclerosis. Hilar contours are not significantly changed. Lungs and pleural spaces are clear. There is multilevel degenerative disc disease. There is bilateral glenohumeral osteoarthritis.  This report was finalized on 12/8/2021 12:24 PM by Dr. Radha Cleaning M.D.        Ordered the above noted radiological studies. Reviewed by me in PACS.            PROCEDURES  Procedures              MEDICATIONS GIVEN IN ER  Medications   sodium chloride 0.9 % flush 10 mL (has no administration in time range)   cefTRIAXone (ROCEPHIN) 1 g in sodium chloride 0.9 % 100 mL IVPB-VTB (1 g Intravenous New Bag 12/8/21 1304)   sodium chloride 0.9 % bolus 1,000 mL (1,000 mL Intravenous New Bag 12/8/21 1305)                   MEDICAL DECISION MAKING, PROGRESS, and CONSULTS    All labs have been independently reviewed by me.  All radiology studies have  been reviewed by me and discussed with radiologist dictating the report.   EKG's independently viewed and interpreted by me.  Discussion below represents my analysis of pertinent findings related to patient's condition, differential diagnosis, treatment plan and final disposition.      Differential diagnosis includes but is not limited to:  Infectious encephalopathy  Metabolic encephalopathy  DKA  Hyperosmolar hyperglycemic nonketotic state  Sepsis      ED Course as of 12/08/21 1540   Wed Dec 08, 2021   1252 Nitrite, UA(!): Positive [JR]   1253 WBC, UA(!): Too Numerous to Count [JR]   1253 Creatinine: 0.74 [JR]   1253 Glucose(!): 294 [JR]   1253 WBC: 7.80 [JR]   1253 Hemoglobin: 13.2 [JR]   1348 Discussed with Dr. Sera Villavicencio, [facility], who agrees to admit. [JR]      ED Course User Index  [JR] Pb Gomez MD     Patient with likely acute encephalopathy secondary to UTI and hyperglycemia.  She was started on empiric antibiotics will be admitted for further management.         I wore an N95 mask, face shield, and gloves during this patient encounter.  Patient also wearing a surgical mask.  Hand hygeine performed before and after seeing the patient.    DIAGNOSIS  Final diagnoses:   Acute UTI   Acute encephalopathy   Hyperglycemia         DISPOSITION  ADMIT            Latest Documented Vital Signs:  As of 15:40 EST  BP- 145/75 HR- 73 Temp- 98 °F (36.7 °C) (Oral) O2 sat- 95%        --    Please note that portions of this were completed with a voice recognition program.          Pb Gomez MD  12/08/21 1541

## 2021-12-08 NOTE — H&P
"PCP: Elly Mann APRN    Chief complaint   Chief Complaint   Patient presents with   • Altered Mental Status   • Hyperglycemia       HPI  Patient is a 87 y.o. female presents with history of CHF, diabetes, cognitive communication deficit and history of stroke who presents to the ER due to confusion and diarrhea and elevated blood sugars.  Patient states that she has had diarrhea twice.  She does says \"I am confused\".  Nursing home sent a paper in saying that her blood sugars were 329 and blood pressure was 162/83.  When I asked her what was thing that was bothering her the most she said that her family is not getting along.  Supposedly the middle son was needing to help with the affairs and this made the oldest son feel hurt or disrespected and has been angry.  Patient states that her   proximally 5 years ago and everything is kind to deteriorated since then.    PAST MEDICAL HISTORY  Past Medical History:   Diagnosis Date   • CHF (congestive heart failure) (HCC)    • CKD (chronic kidney disease)    • Depression    • Diabetes mellitus (HCC)    • Fibromyalgia    • Hyperlipidemia    • Hypertension    • Hypothyroidism    • Stroke (HCC)    Cognitive communicative deficiency    PAST SURGICAL HISTORY  No past surgical history on file.    FAMILY HISTORY  No family history on file.    SOCIAL HISTORY  Social History     Tobacco Use   • Smoking status: Former Smoker   • Smokeless tobacco: Never Used   Substance Use Topics   • Alcohol use: Not on file   • Drug use: Not on file   Bedbound for a year    MEDICATIONS:  Medications Prior to Admission   Medication Sig Dispense Refill Last Dose   • acetaminophen (TYLENOL) 650 MG 8 hr tablet Take 650 mg by mouth Every 8 (Eight) Hours As Needed for Mild Pain .      • amLODIPine (NORVASC) 10 MG tablet Take 10 mg by mouth Daily.      • apixaban (ELIQUIS) 5 MG tablet tablet Take 5 mg by mouth 2 (Two) Times a Day.      • bisacodyl (Dulcolax) 10 MG suppository Insert 10 mg " "into the rectum Daily.      • cloNIDine (CATAPRES) 0.1 MG tablet Take 0.1 mg by mouth 2 (Two) Times a Day.      • Diclofenac Sodium (VOLTAREN) 1 % gel gel Apply 4 g topically to the appropriate area as directed 4 (Four) Times a Day As Needed.      • DULoxetine (CYMBALTA) 60 MG capsule Take 60 mg by mouth 2 (Two) Times a Day.      • insulin glargine (LANTUS, SEMGLEE) 100 UNIT/ML injection Inject 26 Units under the skin into the appropriate area as directed Daily.      • levothyroxine (SYNTHROID, LEVOTHROID) 100 MCG tablet Take 100 mcg by mouth Daily.      • linagliptin (TRADJENTA) 5 MG tablet tablet Take 5 mg by mouth Daily.      • muscle rub (BenGay) 10-15 % cream cream Apply 1 application topically to the appropriate area as directed Every 4 (Four) Hours As Needed. Apply to Left Shoulder      • ondansetron (ZOFRAN) 4 MG tablet Take 4 mg by mouth Every 8 (Eight) Hours As Needed for Nausea or Vomiting.      • polyethylene glycol (MiraLax) 17 g packet Take 17 g by mouth Daily.      • rosuvastatin (CRESTOR) 20 MG tablet 20 mg Daily.      • traMADol (ULTRAM) 50 MG tablet Take 50 mg by mouth Every 6 (Six) Hours As Needed for Moderate Pain .      • vitamin B-12 (CYANOCOBALAMIN) 1000 MCG tablet Take 1,000 mcg by mouth Daily.      • vitamin D3 125 MCG (5000 UT) capsule capsule Take 5,000 Units by mouth Daily.          Allergies:  Aldactone [spironolactone], Atenolol, Atorvastatin, Cholestyramine, Ezetimibe, Hctz [hydrochlorothiazide], Lisinopril, Losartan, Lyrica [pregabalin], Metformin, Pork-derived products, Sulfa antibiotics, Tapentadol, and Valsartan    Review of Systems:  Unable to obtain due to confusion        Vital Signs  Temp:  [97.8 °F (36.6 °C)-98 °F (36.7 °C)] 98 °F (36.7 °C)  Heart Rate:  [69-76] 73  Resp:  [20] 20  BP: (145-158)/(75-79) 145/75  Flowsheet Rows      First Filed Value   Admission Height 172.7 cm (68\") Documented at 12/08/2021 1532   Admission Weight 132 kg (291 lb 0.1 oz) Documented at 12/08/2021 " 1532         Body mass index is 44.25 kg/m².    Physical Exam:  General Appearance:    Alert, cooperative, in no acute distress   Head:    Normocephalic, without obvious abnormality, atraumatic   Eyes:         conjunctivae and sclerae normal, no icterus, PERRLA   ENT:    Ears grossly intact, oral mucosa moist,   Neck:   No adenopathy, supple, trachea midline,    Back:     Normal to inspection, range of motion normal   Lungs:     Clear to auscultation,respirations regular, even and                   unlabored    Heart:    Regular rhythm and normal rate,  no murmur, normal S1 and S2,   Abdomen:     Normal bowel sounds, no masses,  soft non-tender, non-distended,    Extremities:   Moves all extremities , no cyanosis, 2+ pitting bilateral lower extremity edema continued up to thighs/ anasarca           Pulses:   Pulses palpable and equal bilaterally   Skin:   No bleeding, rash, occasional bruising    Neurologic:    Psych:   Cranial nerves 2 - 12 grossly intact, sensation grossly intact,     Moves all extremities , equal bilateral strength 2/5,     Alert and Oriented x 2, Normal Affect     I used full protective equipment while examining this patient.  This includes N95 face mask /protective eyewear and protective gown when appropriate.  I washed/sanitized my hands before entering the room and immediately upon leaving the room.    LABS:  Admission on 12/08/2021   Component Date Value Ref Range Status   • Glucose 12/08/2021 285* 70 - 130 mg/dL Final    Meter: [id number] : [id number] Boby Vazquez RN   • Glucose 12/08/2021 294* 65 - 99 mg/dL Final   • BUN 12/08/2021 13  8 - 23 mg/dL Final   • Creatinine 12/08/2021 0.74  0.57 - 1.00 mg/dL Final   • Sodium 12/08/2021 136  136 - 145 mmol/L Final   • Potassium 12/08/2021 4.7  3.5 - 5.2 mmol/L Final   • Chloride 12/08/2021 99  98 - 107 mmol/L Final   • CO2 12/08/2021 32.1* 22.0 - 29.0 mmol/L Final   • Calcium 12/08/2021 10.4  8.6 - 10.5 mg/dL Final   • Total Protein  12/08/2021 6.6  6.0 - 8.5 g/dL Final   • Albumin 12/08/2021 3.00* 3.50 - 5.20 g/dL Final   • ALT (SGPT) 12/08/2021 13  1 - 33 U/L Final   • AST (SGOT) 12/08/2021 12  1 - 32 U/L Final   • Alkaline Phosphatase 12/08/2021 85  39 - 117 U/L Final   • Total Bilirubin 12/08/2021 0.4  0.0 - 1.2 mg/dL Final   • eGFR Non  Amer 12/08/2021 74  >60 mL/min/1.73 Final   • Globulin 12/08/2021 3.6  gm/dL Final   • A/G Ratio 12/08/2021 0.8  g/dL Final   • BUN/Creatinine Ratio 12/08/2021 17.6  7.0 - 25.0 Final   • Anion Gap 12/08/2021 4.9* 5.0 - 15.0 mmol/L Final   • Color, UA 12/08/2021 Yellow  Yellow, Straw Final   • Appearance, UA 12/08/2021 Turbid* Clear Final   • pH, UA 12/08/2021 5.5  5.0 - 8.0 Final   • Specific Gravity, UA 12/08/2021 1.015  1.005 - 1.030 Final   • Glucose, UA 12/08/2021 100 mg/dL (Trace)* Negative Final   • Ketones, UA 12/08/2021 Negative  Negative Final   • Bilirubin, UA 12/08/2021 Negative  Negative Final   • Blood, UA 12/08/2021 Large (3+)* Negative Final   • Protein, UA 12/08/2021 >=300 mg/dL (3+)* Negative Final   • Leuk Esterase, UA 12/08/2021 Large (3+)* Negative Final   • Nitrite, UA 12/08/2021 Positive* Negative Final   • Urobilinogen, UA 12/08/2021 1.0 E.U./dL  0.2 - 1.0 E.U./dL Final   • COVID19 12/08/2021 Not Detected  Not Detected - Ref. Range Final   • WBC 12/08/2021 7.80  3.40 - 10.80 10*3/mm3 Final   • RBC 12/08/2021 4.71  3.77 - 5.28 10*6/mm3 Final   • Hemoglobin 12/08/2021 13.2  12.0 - 15.9 g/dL Final   • Hematocrit 12/08/2021 42.1  34.0 - 46.6 % Final   • MCV 12/08/2021 89.4  79.0 - 97.0 fL Final   • MCH 12/08/2021 28.0  26.6 - 33.0 pg Final   • MCHC 12/08/2021 31.4* 31.5 - 35.7 g/dL Final   • RDW 12/08/2021 13.0  12.3 - 15.4 % Final   • RDW-SD 12/08/2021 42.2  37.0 - 54.0 fl Final   • MPV 12/08/2021 9.8  6.0 - 12.0 fL Final   • Platelets 12/08/2021 250  140 - 450 10*3/mm3 Final   • Neutrophil % 12/08/2021 50.8  42.7 - 76.0 % Final   • Lymphocyte % 12/08/2021 34.5  19.6 - 45.3 % Final    • Monocyte % 12/08/2021 9.0  5.0 - 12.0 % Final   • Eosinophil % 12/08/2021 5.0  0.3 - 6.2 % Final   • Basophil % 12/08/2021 0.4  0.0 - 1.5 % Final   • Immature Grans % 12/08/2021 0.3  0.0 - 0.5 % Final   • Neutrophils, Absolute 12/08/2021 3.97  1.70 - 7.00 10*3/mm3 Final   • Lymphocytes, Absolute 12/08/2021 2.69  0.70 - 3.10 10*3/mm3 Final   • Monocytes, Absolute 12/08/2021 0.70  0.10 - 0.90 10*3/mm3 Final   • Eosinophils, Absolute 12/08/2021 0.39  0.00 - 0.40 10*3/mm3 Final   • Basophils, Absolute 12/08/2021 0.03  0.00 - 0.20 10*3/mm3 Final   • Immature Grans, Absolute 12/08/2021 0.02  0.00 - 0.05 10*3/mm3 Final   • nRBC 12/08/2021 0.0  0.0 - 0.2 /100 WBC Final   • RBC, UA 12/08/2021 Unable to determine due to loaded field* None Seen, 0-2 /HPF Final   • WBC, UA 12/08/2021 Too Numerous to Count* None Seen, 0-2 /HPF Final   • Bacteria, UA 12/08/2021 Unable to determine due to loaded field* None Seen /HPF Final   • Squamous Epithelial Cells, UA 12/08/2021 Unable to determine due to loaded field* None Seen, 0-2 /HPF Final   • Hyaline Casts, UA 12/08/2021 Unable to determine due to loaded field  None Seen /LPF Final   • Methodology 12/08/2021 Manual Light Microscopy   Final       DIAGNOSTICS:  XR CHEST 1 VW-     HISTORY:  Cough, weakness.     COMPARISON:  Chest radiograph 04/16/2021.     FINDINGS:    A single portable view of the chest was obtained. The cardiac silhouette  is normal in size. There is calcific aortic atherosclerosis. Hilar  contours are not significantly changed. Lungs and pleural spaces are  clear. There is multilevel degenerative disc disease. There is bilateral  glenohumeral osteoarthritis.         Results Review:   I reviewed the patient's new clinical results.  Discussed with ER physician  [facility] records reviewed / Medical Decision Making High Complexity  I personally viewed and interpreted the patient's EKG/Telemetry data- sinus rhythm      ASSESSMENT AND PLAN      ·     Acute metabolic  encephalopathy  -Likely multifactorial with blood sugars being elevated, UTI possible diarrhea illness   -We will get PT and OT to evaluate and treat    ·   Acute UTI  -Appears that patient may have had a UTI back a few months ago.  -IV Rocephin will continue    ·  Diarrhea  -Getting stool culture  -Abortive care    ·   Type 2 diabetes mellitus with hyperglycemia, with long-term current use of insulin   --Accu-Cheks  -hypoglycemia protocol  -sliding scale insulin to cover outlier blood sugars  -Patient has been probably having some hyperglycemia likely due to infection.  Patient is on Lantus 25 units twice daily and orals    ·   Cognitive communication deficit/ History of right MCA stroke  -Likely some underlying dementia    ·   Hypertension/lower extremity edema with some anasarca  -Some hypoalbuminemia with some protein in her urine we will get a protein creatinine ratio  -Patient is on Norvasc and can cause some lower extremity edema.  However patient's been bedbound for a year.    ·  Chronic Medical conditions being monitored- stable, continue medical managment  -Nursing home resident  -      +DVT proph: eliquis (think this is due to the history of stroke)  + DNR (do not resuscitate)    I discussed the patients findings and my recommendations with the patient and/or family.  Please reference all orders placed.    Sera Silveira MD  12/08/21  17:53 EST      This document is intended for medical expert use only. Reading of this document by patients and/or patient's family without participating in medical staff guidance may result in misinterpretation and unintended morbidity. Any interpretation of such data is the responsibility of the patient and/or family member responsible for the patient in concert with their primary or specialist providers, and NOT to be left for sources of online searches such as [employer], [employer] or similar queries. Relying on these approaches to knowledge may result in misinterpretation,  misguided goals of care and even death should patients or family members try recommendations outside of the realm of professional medical care in a supervised way.    Dictated utilizing Dragon dictation:  Much of this encounter note is an electronic transcription/translation of spoken language to printed text. The electronic translation of spoken language may permit erroneous, or at times, nonsensical words or phrases to be inadvertently transcribed; Although I have reviewed the note for such errors, some may still exist.

## 2021-12-08 NOTE — ED NOTES
Nursing report ED to floor  Jolanta Phipps  87 y.o.  female    HPI :   Chief Complaint   Patient presents with   • Altered Mental Status   • Hyperglycemia       Admitting doctor:   Sera Silveira MD    Admitting diagnosis:   The primary encounter diagnosis was Acute UTI. Diagnoses of Acute encephalopathy and Hyperglycemia were also pertinent to this visit.    Code status:   Current Code Status     Date Active Code Status Order ID Comments User Context       Not on file    Advance Care Planning Activity          Allergies:   Aldactone [spironolactone], Atenolol, Atorvastatin, Cholestyramine, Ezetimibe, Hctz [hydrochlorothiazide], Lisinopril, Losartan, Lyrica [pregabalin], Metformin, Pork-derived products, Sulfa antibiotics, Tapentadol, and Valsartan    Intake and Output  No intake or output data in the 24 hours ending 12/08/21 1359    Weight:   There were no vitals filed for this visit.    Most recent vitals:   Vitals:    12/08/21 1105 12/08/21 1158 12/08/21 1304 12/08/21 1351   BP: 158/79      BP Location: Right arm      Patient Position: Sitting      Pulse: 72 76 76 69   Resp: 20      Temp: 97.8 °F (36.6 °C)      SpO2: 94% 96% 97% 97%       Active LDAs/IV Access:   Lines, Drains & Airways     Active LDAs     Name Placement date Placement time Site Days    Peripheral IV 12/08/21 1103 Right Antecubital 12/08/21  1103  Antecubital  less than 1                Labs (abnormal labs have a star):   Labs Reviewed   COMPREHENSIVE METABOLIC PANEL - Abnormal; Notable for the following components:       Result Value    Glucose 294 (*)     CO2 32.1 (*)     Albumin 3.00 (*)     Anion Gap 4.9 (*)     All other components within normal limits    Narrative:     GFR Normal >60  Chronic Kidney Disease <60  Kidney Failure <15     URINALYSIS W/ MICROSCOPIC IF INDICATED (NO CULTURE) - Abnormal; Notable for the following components:    Appearance, UA Turbid (*)     Glucose,  mg/dL (Trace) (*)     Blood, UA Large (3+) (*)      Protein, UA >=300 mg/dL (3+) (*)     Leuk Esterase, UA Large (3+) (*)     Nitrite, UA Positive (*)     All other components within normal limits   CBC WITH AUTO DIFFERENTIAL - Abnormal; Notable for the following components:    MCHC 31.4 (*)     All other components within normal limits   URINALYSIS, MICROSCOPIC ONLY - Abnormal; Notable for the following components:    RBC, UA Unable to determine due to loaded field (*)     WBC, UA Too Numerous to Count (*)     Bacteria, UA Unable to determine due to loaded field (*)     Squamous Epithelial Cells, UA Unable to determine due to loaded field (*)     All other components within normal limits   POCT GLUCOSE FINGERSTICK - Abnormal; Notable for the following components:    Glucose 285 (*)     All other components within normal limits   COVID-19,[facility] IN-HOUSE CEPHEID/LINDA, NP SWAB IN TRANSPORT MEDIA 8-12 HR TAT - Normal    Narrative:     Fact sheet for providers: https://www.fda.gov/media/702046/download    Fact sheet for patients: https://www.fda.gov/media/523813/download    Test performed by PCR.   COVID PRE-OP / PRE-PROCEDURE SCREENING ORDER (NO ISOLATION)    Narrative:     The following orders were created for panel order COVID PRE-OP / PRE-PROCEDURE SCREENING ORDER (NO ISOLATION) - Swab, Nasopharynx.  Procedure                               Abnormality         Status                     ---------                               -----------         ------                     COVID-19,[facility]EE[facility] IN-HOUSE...[[id number]]  Normal              Final result                 Please view results for these tests on the individual orders.   URINALYSIS W/ CULTURE IF INDICATED   CBC AND DIFFERENTIAL    Narrative:     The following orders were created for panel order CBC & Differential.  Procedure                               Abnormality         Status                     ---------                               -----------         ------                     CBC Auto  Differential[[id number]]        Abnormal            Final result                 Please view results for these tests on the individual orders.       EKG:   No orders to display       Meds given in ED:   Medications   sodium chloride 0.9 % flush 10 mL (has no administration in time range)   sodium chloride 0.9 % bolus 1,000 mL (1,000 mL Intravenous New Bag 12/8/21 1305)   cefTRIAXone (ROCEPHIN) 1 g in sodium chloride 0.9 % 100 mL IVPB-VTB (1 g Intravenous New Bag 12/8/21 1304)       Imaging results:  No radiology results for the last day    Ambulatory status:   - bedrest    Social issues:   Social History     Socioeconomic History   • Marital status:        NIH Stroke Scale:        Nursing report ED to floor:       Lynnette Moses RN  12/08/21 8429

## 2021-12-08 NOTE — PROGRESS NOTES
Clinical Pharmacy Services: Medication History    Jolanta Phipps is a 87 y.o. female presenting to [facility] for   Chief Complaint   Patient presents with   • Altered Mental Status   • Hyperglycemia       She  has a past medical history of CHF (congestive heart failure) (CMS/[facility]), CKD (chronic kidney disease), Depression, Diabetes mellitus (CMS/[facility]), Fibromyalgia, Hyperlipidemia, Hypertension, Hypothyroidism, and Stroke (CMS/[facility]).    Allergies as of 12/08/2021 - Reviewed 12/08/2021   Allergen Reaction Noted   • Aldactone [spironolactone] Unknown - Low Severity 04/16/2021   • Atenolol Unknown - Low Severity 04/16/2021   • Atorvastatin Unknown - Low Severity 04/16/2021   • Cholestyramine Unknown - Low Severity 04/16/2021   • Ezetimibe Unknown - Low Severity 04/16/2021   • Hctz [hydrochlorothiazide] Unknown - Low Severity 04/16/2021   • Lisinopril Unknown - Low Severity 04/16/2021   • Losartan Unknown - Low Severity 04/16/2021   • Lyrica [pregabalin] Unknown - Low Severity 04/16/2021   • Metformin Unknown - Low Severity 04/16/2021   • Pork-derived products GI Intolerance 04/16/2021   • Sulfa antibiotics Unknown - Low Severity 04/16/2021   • Tapentadol Unknown - Low Severity 04/16/2021   • Valsartan Unknown - Low Severity 04/16/2021       Medication information was obtained from: Nursing Home   Pharmacy and Phone Number:     Prior to Admission Medications     Prescriptions Last Dose Informant Patient Reported? Taking?    acetaminophen (TYLENOL) 650 MG 8 hr tablet  Nursing Home Yes Yes    Take 650 mg by mouth Every 8 (Eight) Hours As Needed for Mild Pain .    amLODIPine (NORVASC) 10 MG tablet  Nursing Home Yes Yes    Take 10 mg by mouth Daily.    apixaban (ELIQUIS) 5 MG tablet tablet  Nursing Home Yes Yes    Take 5 mg by mouth 2 (Two) Times a Day.    bisacodyl (Dulcolax) 10 MG suppository  Nursing Home Yes Yes    Insert 10 mg into the rectum Daily.    cloNIDine (CATAPRES) 0.1 MG tablet  Self Yes Yes    Take  0.1 mg by mouth 2 (Two) Times a Day.    Diclofenac Sodium (VOLTAREN) 1 % gel gel  Nursing Home Yes Yes    Apply 4 g topically to the appropriate area as directed 4 (Four) Times a Day As Needed.    DULoxetine (CYMBALTA) 60 MG capsule  Nursing Home Yes Yes    Take 60 mg by mouth 2 (Two) Times a Day.    insulin glargine (LANTUS, SEMGLEE) 100 UNIT/ML injection  Nursing Home Yes Yes    Inject 26 Units under the skin into the appropriate area as directed Daily.    levothyroxine (SYNTHROID, LEVOTHROID) 100 MCG tablet  Nursing Home Yes Yes    Take 100 mcg by mouth Daily.    linagliptin (TRADJENTA) 5 MG tablet tablet  Nursing Home Yes Yes    Take 5 mg by mouth Daily.    muscle rub (BenGay) 10-15 % cream cream  Nursing Home Yes Yes    Apply 1 application topically to the appropriate area as directed Every 4 (Four) Hours As Needed. Apply to Left Shoulder    ondansetron (ZOFRAN) 4 MG tablet  Nursing Home Yes Yes    Take 4 mg by mouth Every 8 (Eight) Hours As Needed for Nausea or Vomiting.    polyethylene glycol (MiraLax) 17 g packet  Nursing Home Yes Yes    Take 17 g by mouth Daily.    rosuvastatin (CRESTOR) 20 MG tablet  Nursing Home Yes Yes    20 mg Daily.    traMADol (ULTRAM) 50 MG tablet  Nursing Home Yes Yes    Take 50 mg by mouth Every 6 (Six) Hours As Needed for Moderate Pain .    vitamin B-12 (CYANOCOBALAMIN) 1000 MCG tablet  Self Yes Yes    Take 1,000 mcg by mouth Daily.    vitamin D3 125 MCG (5000 UT) capsule capsule  Nursing Home Yes Yes    Take 5,000 Units by mouth Daily.            Medication notes:    This medication list is complete to the best of my knowledge as of 12/8/2021    Please call if questions.    Demario Ballesteros  Medication History Technician  [phone]    12/8/2021 13:44 EST

## 2021-12-08 NOTE — ED NOTES
Pt presents to ED via EMS from [facility]. Staff called toady with reports of unresponsive diabetic. Upon EMS arrival pt is A&OX2, which is abnormal for her, and found increase blood sugar. Staff reports they gave her Lantuss this morning with no improvement. Pt is currently A&OX2, did not ambulate, and in a mask.    Patient was placed in face mask during first look triage.  Patient was wearing a face mask throughout encounter.  I wore personal protective equipment throughout the encounter.  Hand hygiene was performed before and after patient encounter.        Dany Rogers, RN  12/08/21 [id number]

## 2021-12-09 NOTE — PLAN OF CARE
Goal Outcome Evaluation:  Plan of Care Reviewed With: patient           Outcome Summary: Pt is an 88 y/o F admit from SNF for confusion, diarrhea and elevated blood sugars. W/u for acute metabolic encephalopathy and acute UTI. PMH includes CHF, DM, R MCA stroke, cognitive communication deficits, depression and fibromyalgia. Pt uses a nima lift at baseline for transfers, but reports being indep with feeding and grooming. Pt is RHD. Pt reports assist for all other ADLs as baseline. Pt completed basic grooming and hygiene in long sitting. Pt noted with significant pain in L elbow, despite full active assist ROM. No current imaging noted. Pt appears at baseline with ADLs. OT will s/o at this time and recommend d/c back to SNF.    Patient was not wearing a face mask during this therapy encounter. Therapist used appropriate personal protective equipment including mask, gloves, and eye protection.  Mask used was standard procedure mask. Appropriate PPE was worn during the entire therapy session. Hand hygiene was completed before and after therapy session. Patient is not in enhanced droplet precautions.

## 2021-12-09 NOTE — THERAPY EVALUATION
Acute Care - Speech Language Pathology   Swallow Initial Evaluation [facility]     Patient Name: Jolanta Pihpps  : 1934  MRN: [id number]  Today's Date: 2021               Admit Date: 2021    Visit Dx:     ICD-10-CM ICD-9-CM   1. Acute UTI  N39.0 599.0   2. Acute encephalopathy  G93.40 348.30   3. Hyperglycemia  R73.9 790.29     Patient Active Problem List   Diagnosis   • Acute UTI   • Acute metabolic encephalopathy   • Diarrhea   • Cognitive communication deficit   • History of right MCA stroke   • Type 2 diabetes mellitus with hyperglycemia, with long-term current use of insulin ([facility])   • Hypertension   • DNR (do not resuscitate)   • Nursing home resident     Past Medical History:   Diagnosis Date   • CHF (congestive heart failure) ([facility])    • CKD (chronic kidney disease)    • Depression    • Diabetes mellitus ([facility])    • Fibromyalgia    • Hyperlipidemia    • Hypertension    • Hypothyroidism    • Stroke ([facility])      No past surgical history on file.    SLP Recommendation and Plan  SLP Swallowing Diagnosis: swallow WFL (21)  SLP Diet Recommendation: regular textures, thin liquids (21)  Recommended Precautions and Strategies: upright posture during/after eating, small bites of food and sips of liquid (21)  SLP Rec. for Method of Medication Administration: as tolerated (21)     Monitor for Signs of Aspiration: notify SLP if any concerns (21)     Swallow Criteria for Skilled Therapeutic Interventions Met: no problems identified which require skilled intervention (21)  Anticipated Discharge Disposition (SLP): skilled nursing facility (21)     Therapy Frequency (Swallow): evaluation only (21)                            Plan of Care Reviewed With: patient, other (see comments) (RN, Kirsty)  Outcome Summary: Pt appears WFL for current diet of regular/thin, no changes to VQ after swallow, no issues noted.  SLP will sign  off.  Please reconsult, if needed.      SWALLOW EVALUATION (last 72 hours)     SLP Adult Swallow Evaluation     Row Name 12/09/21 2841                   General Information    Patient's Goals for Discharge no concerns voiced  -CB                  Oral Motor Structure and Function    Dentition Assessment upper dentures/partial in place; lower dentures/partial in place  -CB        Secretion Management WNL/WFL  -CB        Mucosal Quality moist, healthy  -CB                  Oral Musculature and Cranial Nerve Assessment    Oral Motor General Assessment WFL  -CB        Oral Motor, Comment No difficulties with mastiction, patient report discomfort to anterior upper/lower gums, RN notified  -CB                  General Eating/Swallowing Observations    Respiratory Support Currently in Use nasal cannula  -CB        Eating/Swallowing Skills self-fed  -CB        Positioning During Eating upright in bed  -CB        Utensils Used straw  -CB        Consistencies Trialed regular textures; thin liquids  -CB                  Respiratory    Respiratory Status WFL  -CB                  Clinical Swallow Eval    Clinical Swallow Evaluation Summary 87 yof reported to have WVQ after swallow and failed screen.  Pt on regular/thin diet, dentition in place, no issues with mastication/swallow initiation.  Pt appropriate to remain on current diet of regular/thin with no further ST needs at this time.  SLP will sign off.  -CB                  Clinical Impression    SLP Swallowing Diagnosis swallow WFL  -CB        Functional Impact no impact on function  -CB        Swallow Criteria for Skilled Therapeutic Interventions Met no problems identified which require skilled intervention  -CB                  Recommendations    Therapy Frequency (Swallow) evaluation only  -CB        SLP Diet Recommendation regular textures; thin liquids  -CB        Recommended Precautions and Strategies upright posture during/after eating; small bites of food and sips  of liquid  -CB        Oral Care Recommendations Oral Care BID/PRN  -CB        SLP Rec. for Method of Medication Administration as tolerated  -CB        Monitor for Signs of Aspiration notify SLP if any concerns  -CB        Anticipated Discharge Disposition (SLP) [facility] nursing [facility]  -CB              User Key  (r) = Recorded By, (t) = Taken By, (c) = Cosigned By    Initials Name Effective Dates    Danielle Crocker SLP 11/16/21 -                 EDUCATION  The patient has been educated in the following areas:   Dysphagia (Swallowing Impairment).         SLP Outcome Measures (last 72 hours)     SLP Outcome Measures     Row Name 12/09/21 1400             SLP Outcome Measures    Outcome Measure Used? Adult NOMS  -CB              Adult FCM Scores    Swallowing FCM Score 7  -CB            User Key  (r) = Recorded By, (t) = Taken By, (c) = Cosigned By    Initials Name Effective Dates    Danielle Crocker SLP 11/16/21 -                  Time Calculation:    Time Calculation- SLP     Row Name 12/09/21 1407             Time Calculation- SLP    SLP Start Time 1345  -CB      SLP Received On 12/09/21  -CB            User Key  (r) = Recorded By, (t) = Taken By, (c) = Cosigned By    Initials Name Provider Type    Danielle Crocker, SLP Speech and Language Pathologist                Therapy Charges for Today     Code Description Service Date Service Provider Modifiers Qty    [id number] HC ST EVAL ORAL PHARYNG SWALLOW 3 12/9/2021 Danielle Encarnacion SLP GN 1               TA LIZ  12/9/2021

## 2021-12-09 NOTE — PROGRESS NOTES
Dedicated to Hospital Care    [phone]   LOS: 0 days     Name: Jolanta Phipps  Age/Sex: 87 y.o. female  :  1934        PCP: Lizz Sanchez APRN  Chief Complaint   Patient presents with   • Altered Mental Status   • Hyperglycemia      Subjective   Remains somewhat confused but unclear what her baseline level of mental status is.  Per nursing this may not be far from where she normally is.  She resides in a nursing home at baseline.  General: No Fever or Chills, Cardiac: No Chest Pain or Palpitations, Resp: No Cough or SOA, GI: No Nausea, Vomiting, or Diarrhea and Other: No bleeding    amLODIPine, 10 mg, Oral, Daily  apixaban, 5 mg, Oral, BID  bisacodyl, 10 mg, Rectal, Daily  cefTRIAXone, 1 g, Intravenous, Q24H  cholecalciferol, 5,000 Units, Oral, Daily  cloNIDine, 0.1 mg, Oral, BID  DULoxetine, 60 mg, Oral, BID  insulin glargine, 25 Units, Subcutaneous, Q12H  insulin lispro, 0-14 Units, Subcutaneous, TID AC  levothyroxine, 100 mcg, Oral, Q AM  linagliptin, 5 mg, Oral, Daily  polyethylene glycol, 17 g, Oral, Daily  rosuvastatin, 20 mg, Oral, Nightly  senna-docusate sodium, 2 tablet, Oral, BID  sodium chloride, 10 mL, Intravenous, Q12H  vitamin B-12, 1,000 mcg, Oral, Daily           Objective   Vital Signs  Temp:  [98 °F (36.7 °C)-98.1 °F (36.7 °C)] 98.1 °F (36.7 °C)  Heart Rate:  [69-77] 77  Resp:  [20-22] 20  BP: (110-158)/(53-94) 110/53  Body mass index is 43.51 kg/m².    Intake/Output Summary (Last 24 hours) at 2021 1216  Last data filed at 2021 0945  Gross per 24 hour   Intake 600 ml   Output 480 ml   Net 120 ml       Physical Exam  Vitals and nursing note reviewed.   Constitutional:       Appearance: Normal appearance.   HENT:      Head: Normocephalic and atraumatic.   Cardiovascular:      Rate and Rhythm: Normal rate and regular rhythm.   Pulmonary:      Effort: Pulmonary effort is normal. No respiratory distress.      Breath sounds: Normal breath sounds.   Abdominal:      General: Bowel  sounds are normal.      Palpations: Abdomen is soft.   Neurological:      General: No focal deficit present.      Mental Status: She is alert.           Results Review:       I reviewed the patient's new clinical results.  Results from last 7 days   Lab Units 12/09/21  0611 12/08/21  1118   WBC 10*3/mm3 8.80 7.80   HEMOGLOBIN g/dL 12.2 13.2   PLATELETS 10*3/mm3 236 250     Results from last 7 days   Lab Units 12/09/21  0611 12/08/21  1118   SODIUM mmol/L 135* 136   POTASSIUM mmol/L 4.2 4.7   CHLORIDE mmol/L 102 99   CO2 mmol/L 29.4* 32.1*   BUN mg/dL 12 13   CREATININE mg/dL 0.53* 0.74   CALCIUM mg/dL 9.6 10.4   Estimated Creatinine Clearance: 70.6 mL/min (A) (by C-G formula based on SCr of 0.53 mg/dL (L)).  No results found for: HGBA1C  Glucose   Date/Time Value Ref Range Status   12/09/2021 1121 209 (H) 70 - 130 mg/dL Final     Comment:     Meter: [id number] : [id number] MissyGudeliaPANKAJKirkFernie Florian NA   12/09/2021 0608 165 (H) 70 - 130 mg/dL Final     Comment:     Meter: [id number] : [id number] Cira Lance NA   12/08/2021 2046 213 (H) 70 - 130 mg/dL Final     Comment:     Meter: [id number] : [id number] Cira Lance NA   12/08/2021 1847 246 (H) 70 - 130 mg/dL Final     Comment:     Meter: [id number] : [id number] Tierney Dueñas RN   12/08/2021 1111 285 (H) 70 - 130 mg/dL Final     Comment:     Meter: [id number] : [id number] Boby Vazquez RN         Assessment/Plan   Active Hospital Problems    Diagnosis  POA   • Acute UTI [N39.0]  Yes   • Acute metabolic encephalopathy [G93.41]  Yes   • Diarrhea [R19.7]  Yes   • Cognitive communication deficit [R41.841]  Yes   • History of right MCA stroke [Z86.73]  Not Applicable   • Type 2 diabetes mellitus with hyperglycemia, with long-term current use of insulin ([facility]) [E11.65, Z79.4]  Not Applicable   • Hypertension [I10]  Yes   • DNR (do not resuscitate) [Z66]  Yes   • Nursing home resident [Z59.3]  Not Applicable      Resolved Hospital Problems   No resolved  problems to display.       PLAN  This is an 88 yo lady with a history of DM2, obesity, CVA, and HTN who presents from the nursing home with hyperglycemia and diarrhea  -found with Enteroaggregative E. coli on GI PCR; no treatment required appreciate ID input  -Urine cx pending abx per ID  -Not sure how off from her baseline she is from a mental status standpoint  -Add 5 units of insulin with meals, continue basal insulin and correctional coverage  -Eliquis continued and covers DVT prophylaxis  -DNR      Disposition  Back to SNF tomorrow if ok with ID      Ethan Smiley MD  [facility]  12/09/21  12:16 EST

## 2021-12-09 NOTE — CONSULTS
Referring Provider: Sera Silveira, *  Reason for Consultation: UTI and E. coli diarrhea  Chief Complaint   Patient presents with   • Altered Mental Status   • Hyperglycemia         Subjective   History of present illness: Patient is a 87-year-old female with past medical history of CHF, CKD, CVA and diabetes who presents to the emergency department with confusion and diarrhea from a nursing facility.  ID was consulted in the setting of possible UTI and E. coli diarrhea.    Patient is pleasantly confused this morning and reports she is unsure what brought her to the hospital.  She states she was sleeping at her nursing facility was brought here for an unknown reason.  She does however report prior to coming into the hospital she was having diarrhea and burning with urination.  Reports she had abdominal pain that was either from the urine or diarrhea but she is unsure.  She denies any of these symptoms currently.  She reports diarrhea has improved and her burning with urination has stopped.  She denies any other acute complaints currently.  She is alert oriented to person in the hospital but not to time or year.  She is unable to answer any further history questions and actually became more confused during her interview with tangential thoughts.    Nurses at bedside reports patient has not had any loose stools today.    Past Medical History:   Diagnosis Date   • CHF (congestive heart failure) (HCC)    • CKD (chronic kidney disease)    • Depression    • Diabetes mellitus ([facility])    • Fibromyalgia    • Hyperlipidemia    • Hypertension    • Hypothyroidism    • Stroke ([facility])        No past surgical history on file.    Family history unable to be obtained due to mental status.     reports that she has quit smoking. She has never used smokeless tobacco.     Allergies   Allergen Reactions   • Aldactone [Spironolactone] Unknown - Low Severity   • Atenolol Unknown - Low Severity   • Atorvastatin Unknown - Low Severity   •  Cholestyramine Unknown - Low Severity   • Ezetimibe Unknown - Low Severity   • Hctz [Hydrochlorothiazide] Unknown - Low Severity   • Lisinopril Unknown - Low Severity   • Losartan Unknown - Low Severity   • Lyrica [Pregabalin] Unknown - Low Severity   • Metformin Unknown - Low Severity   • Pork-Derived Products GI Intolerance   • Sulfa Antibiotics Unknown - Low Severity   • Tapentadol Unknown - Low Severity   • Valsartan Unknown - Low Severity       Medication:  Antibiotics:  Anti-Infectives (From admission, onward)    Ordered     Dose/Rate Route Frequency Start Stop    12/08/21 1720  cefTRIAXone (ROCEPHIN) 1 g in sodium chloride 0.9 % 100 mL IVPB-VTB        Ordering Provider: Sera Silveira MD    1 g  200 mL/hr over 30 Minutes Intravenous Every 24 Hours 12/09/21 1400 12/11/21 1359    12/08/21 1254  cefTRIAXone (ROCEPHIN) 1 g in sodium chloride 0.9 % 100 mL IVPB-VTB        Ordering Provider: Pb Gomez MD    1 g  200 mL/hr over 30 Minutes Intravenous Once 12/08/21 1256 12/08/21 1334          Review of Systems  Review of systems could not be obtained due to   patient confusion.    Objective     Physical Exam:   Vital Signs   Temp:  [98 °F (36.7 °C)-98.1 °F (36.7 °C)] 98.1 °F (36.7 °C)  Heart Rate:  [69-77] 77  Resp:  [20-22] 20  BP: (110-158)/(53-94) 110/53    GENERAL: Awake and alert, in no acute distress.  Obese.  HEENT: Oropharynx is clear. Hearing is grossly normal.   EYES: PERRL. No conjunctival injection. No lid lag.   LYMPHATICS: No lymphadenopathy of the neck or inguinal regions.   HEART: Regular rate and regular rhythm. No peripheral edema.   LUNGS: Normal respiratory effort.  No wheezing or rhonchi.  GI: Soft, nontender, nondistended. No appreciable organomegaly.   SKIN: Warm and dry without cutaneous eruptions   PSYCHIATRIC: Appropriate mood, affect, insight, and judgment.     Results Review:   I reviewed the patient's new clinical results.  I reviewed the patient's new imaging results  and agree with the interpretation.  I reviewed the patient's other test results and agree with the interpretation    Lab Results   Component Value Date    WBC 8.80 12/09/2021    HGB 12.2 12/09/2021    HCT 38.1 12/09/2021    MCV 88.0 12/09/2021     12/09/2021       No results found for: VANCOPEAK, VANCOTROUGH, VANCORANDOM    Lab Results   Component Value Date    GLUCOSE 171 (H) 12/09/2021    BUN 12 12/09/2021    CREATININE 0.53 (L) 12/09/2021    EGFRIFNONA 109 12/09/2021    BCR 22.6 12/09/2021    CO2 29.4 (H) 12/09/2021    CALCIUM 9.6 12/09/2021    ALBUMIN 3.00 (L) 12/08/2021    AST 12 12/08/2021    ALT 13 12/08/2021         Estimated Creatinine Clearance: 70.6 mL/min (A) (by C-G formula based on SCr of 0.53 mg/dL (L)).      Microbiology:  12/8 Covid negative  12/8 urine culture with growth however too young to evaluate.  12/8 GI pathogen panel positive for Enteroaggregative E. coli    Radiology:  12/8 chest x-ray reviewed by me with no concern for pulmonary infiltrate.    Assessment/Plan   #Acute urinary tract infection  #Altered mental status  #E. coli diarrhea  #Diabetes complicating the above    Patient does endorse urinary symptoms prior to her presentation to the ED and UA is concerning for possible infection versus gross contamination.  We will plan to follow-up culture results to further outline antibiotic therapy.  I agree with ceftriaxone 1 g daily at this time.    As for her results of the GI pathogen panel, Enteroaggregative E. coli is largely self-limiting diarrheal illness and she reports resolution of her symptoms already however given she is currently on antibiotic therapy I do not know that adding anything else would be warranted.  In severe cases antibiotics could be considered but will not plan any additional at this time.    Thank you for this consult.  We will continue to follow along and tailor antibiotics as the patient's clinical course evolves.

## 2021-12-09 NOTE — THERAPY EVALUATION
Patient Name: Jolanta Phipps  : 1934    MRN: [id number]                              Today's Date: 2021       Admit Date: 2021    Visit Dx:     ICD-10-CM ICD-9-CM   1. Acute UTI  N39.0 599.0   2. Acute encephalopathy  G93.40 348.30   3. Hyperglycemia  R73.9 790.29     Patient Active Problem List   Diagnosis   • Acute UTI   • Acute metabolic encephalopathy   • Diarrhea   • Cognitive communication deficit   • History of right MCA stroke   • Type 2 diabetes mellitus with hyperglycemia, with long-term current use of insulin (HCC)   • Hypertension   • DNR (do not resuscitate)   • Nursing home resident     Past Medical History:   Diagnosis Date   • CHF (congestive heart failure) (Formerly [facility])    • CKD (chronic kidney disease)    • Depression    • Diabetes mellitus ([facility])    • Fibromyalgia    • Hyperlipidemia    • Hypertension    • Hypothyroidism    • Stroke (Formerly [facility])      No past surgical history on file.   General Information     Row Name 21 1348          OT Time and Intention    Document Type evaluation  -IGNACIO     Mode of Treatment occupational therapy; individual therapy  -     Row Name 21 1348          General Information    Patient Profile Reviewed yes  -IGNACIO     Prior Level of Function dependent:; transfer; independent:; feeding; grooming; mod assist:; max assist:; dressing; bathing  Pt used nima lift at baseline.  -IGNACIO     Existing Precautions/Restrictions fall  -IGNACIO     Barriers to Rehab medically complex; previous functional deficit  -IGNACIO     Row Name 21 1348          Occupational Profile    Reason for Services/Referral (Occupational Profile) Pt is an 88 y/o F admit from SNF for confusion, diarrhea and elevated blood sugars. W/u for acute metabolic encephalopathy and acute UTI. PMH includes CHF, DM, R MCA stroke, cognitive communication deficits, depression and fibromyalgia. Pt uses a nima lift at baseline for transfers, but reports being indep with feeding and grooming. Pt is RHD. Pt reports  assist for all other ADLs as baseline.  -IGNACIO     Row Name 12/09/21 1348          Living Environment    Lives With facility resident  -IGNACIO     Row Name 12/09/21 1348          Home Main Entrance    Number of Stairs, Main Entrance none  -IGNACIO     Row Name 12/09/21 1348          Stairs Within Home, Primary    Number of Stairs, Within Home, Primary none  -IGNACIO     Row Name 12/09/21 1348          Cognition    Orientation Status (Cognition) oriented x 3  -IGNACIO     Row Name 12/09/21 1348          Safety Issues, Functional Mobility    Safety Issues Affecting Function (Mobility) safety precaution awareness; safety precautions follow-through/compliance; awareness of need for assistance; insight into deficits/self-awareness  -IGNACIO     Impairments Affecting Function (Mobility) balance; coordination; endurance/activity tolerance; strength; pain; sensation/sensory awareness  -IGNACIO           User Key  (r) = Recorded By, (t) = Taken By, (c) = Cosigned By    Initials Name Provider Type    Lizz Hoang OT Occupational Therapist                 Mobility/ADL's     Row Name 12/09/21 1441          Bed Mobility    Bed Mobility bed mobility (all) activities  -IGNACIO     All Activities, [place] (Bed Mobility) dependent (less than 25% patient effort); 2 person assist  -IGNACIO     Comment (Bed Mobility) Pt requires nima lift at baseline for mobility.  -IGNACIO     Row Name 12/09/21 1441          Transfers    Transfers bed-chair transfer  -IGNACIO     Comment (Transfers) Pt requires nima lift at baseline for mobility.  -IGNACIO     Bed-Chair [place] (Transfers) not tested  -IGNACIO     Row Name 12/09/21 1441          Activities of Daily Living    BADL Assessment/Intervention grooming  -     Row Name 12/09/21 1441          Grooming Assessment/Training    [place] Level (Grooming) shave face; hair care, combing/brushing; set up; standby assist  -IGNACIO     Position (Grooming) long sitting  -IGNACIO           User Key  (r) = Recorded By, (t) = Taken By, (c) =  Cosigned By    Initials Name Provider Type    Lizz Hoang OT Occupational Therapist               Obj/Interventions     Row Name 12/09/21 1442          Sensory Assessment (Somatosensory)    Sensory Assessment (Somatosensory) sensation intact  -IGNACIO     Row Name 12/09/21 1442          Vision Assessment/Intervention    Visual Impairment/Limitations WFL  -IGNACIO     Row Name 12/09/21 1442          Range of Motion Comprehensive    Comment, General Range of Motion RUE AROM WFL; LUE: guarded 2/2 L elbow pain (? fibromyalgia). No current imaging noted in chart.  -IGNACIO     Row Name 12/09/21 1442          Strength Comprehensive (MMT)    Comment, General Manual Muscle Testing (MMT) Assessment RUE: 3+/5; LUE NT due to medical restrictions  -IGNACIO     Row Name 12/09/21 1442          Balance    Balance Assessment sitting static balance  -     Static Sitting Balance moderate impairment; supported; long sitting  -           User Key  (r) = Recorded By, (t) = Taken By, (c) = Cosigned By    Initials Name Provider Type    Lizz Hoang OT Occupational Therapist               Goals/Plan    No documentation.                Clinical Impression     Row Name 12/09/21 1443          Pain Assessment    Additional Documentation Pain Scale: FACES Pre/Post-Treatment (Group)  -IGNACIO     Row Name 12/09/21 1443          Pain Scale: Numbers Pre/Post-Treatment    Pain Intervention(s) Repositioned; Ambulation/increased activity  -IGNACIO     Row Name 12/09/21 1443          Pain Scale: FACES Pre/Post-Treatment    Pain: FACES Scale, Pretreatment 4-->hurts little more  -IGNACIO     Posttreatment Pain Rating 6-->hurts even more  -     Pain Location - Side Left  -     Pain Location elbow  -IGNACIO     Row Name 12/09/21 1443          Plan of Care Review    Plan of Care Reviewed With patient  -     Outcome Summary Pt is an 88 y/o F admit from SNF for confusion, diarrhea and elevated blood sugars. W/u for acute metabolic encephalopathy and acute UTI.  PMH includes CHF, DM, R MCA stroke, cognitive communication deficits, depression and fibromyalgia. Pt uses a nima lift at baseline for transfers, but reports being indep with feeding and grooming. Pt is RHD. Pt reports assist for all other ADLs as baseline. Pt completed basic grooming and hygiene in long sitting. Pt noted with significant pain in L elbow, despite full active assist ROM. No current imaging noted. Pt appears at baseline with ADLs. OT will s/o at this time and recommend d/c back to SNF.  -IGNACIO     Row Name 12/09/21 1443          Therapy Assessment/Plan (OT)    Therapy Frequency (OT) evaluation only  -IGNACIO     Row Name 12/09/21 1443          Therapy Plan Review/Discharge Plan (OT)    Anticipated Discharge Disposition (OT) skilled nursing facility  -     Row Name 12/09/21 1443          Positioning and Restraints    Pre-Treatment Position in bed  -IGNACIO     Post Treatment Position bed  -IGNACIO     In Bed notified nsg; supine; call light within reach; encouraged to call for assist; exit alarm on; LUE elevated  -IGNACIO           User Key  (r) = Recorded By, (t) = Taken By, (c) = Cosigned By    Initials Name Provider Type    Lizz Hoang, OT Occupational Therapist               Outcome Measures     Row Name 12/09/21 1446          How much help from another is currently needed...    Putting on and taking off regular lower body clothing? 1  -IGNACIO     Bathing (including washing, rinsing, and drying) 1  -IGNACIO     Toileting (which includes using toilet bed pan or urinal) 1  -IGNACIO     Putting on and taking off regular upper body clothing 1  -IGNACIO     Taking care of personal grooming (such as brushing teeth) 3  -IGNACIO     Eating meals 3  -IGNACIO     AM-PAC 6 Clicks Score (OT) 10  -IGNACIO     Row Name 12/09/21 1446          Modified [place] Scale    Modified [place] Scale 5 - Severe disability.  Bedridden, incontinent, and requiring constant nursing care and attention.  -IGNACIO     Row Name 12/09/21 1446          Functional Assessment     Outcome Measure Options AM-PAC 6 Clicks Daily Activity (OT); Modified [place]  -IGNACIO           User Key  (r) = Recorded By, (t) = Taken By, (c) = Cosigned By    Initials Name Provider Type    Lizz Hoang OT Occupational Therapist                Occupational Therapy Education                 Title: PT OT SLP Therapies (Done)     Topic: Occupational Therapy (Done)     Point: ADL training (Done)     Description:   Instruct learner(s) on proper safety adaptation and remediation techniques during self care or transfers.   Instruct in proper use of assistive devices.              Learning Progress Summary           Patient Acceptance, E,TB, VU,NR by IGNACIO at 12/9/2021 1446                   Point: Home exercise program (Done)     Description:   Instruct learner(s) on appropriate technique for monitoring, assisting and/or progressing therapeutic exercises/activities.              Learning Progress Summary           Patient Acceptance, E,TB, VU,NR by IGNACIO at 12/9/2021 1446                   Point: Precautions (Done)     Description:   Instruct learner(s) on prescribed precautions during self-care and functional transfers.              Learning Progress Summary           Patient Acceptance, E,TB, VU,NR by IGNACIO at 12/9/2021 1446                   Point: Body mechanics (Done)     Description:   Instruct learner(s) on proper positioning and spine alignment during self-care, functional mobility activities and/or exercises.              Learning Progress Summary           Patient Acceptance, E,TB, VU,NR by IGNACIO at 12/9/2021 1446                               User Key     Initials Effective Dates Name Provider Type Samantha PIERRE 06/16/21 -  Lizz Solitario OT Occupational Therapist OT              OT Recommendation and Plan  Therapy Frequency (OT): evaluation only  Plan of Care Review  Plan of Care Reviewed With: patient  Outcome Summary: Pt is an 86 y/o F admit from SNF for confusion, diarrhea and elevated blood sugars.  W/u for acute metabolic encephalopathy and acute UTI. PMH includes CHF, DM, R MCA stroke, cognitive communication deficits, depression and fibromyalgia. Pt uses a nima lift at baseline for transfers, but reports being indep with feeding and grooming. Pt is RHD. Pt reports assist for all other ADLs as baseline. Pt completed basic grooming and hygiene in long sitting. Pt noted with significant pain in L elbow, despite full active assist ROM. No current imaging noted. Pt appears at baseline with ADLs. OT will s/o at this time and recommend d/c back to SNF.     Time Calculation:    Time Calculation- OT     Row Name 12/09/21 1447             Time Calculation- OT    OT Start Time 1009  -IGNACIO      OT Stop Time 1025  -IGNACIO      OT Time Calculation (min) 16 min  -IGNACIO      Total Timed Code Minutes- OT 16 minute(s)  -IGNACIO      OT Received On 12/09/21  -IGNACIO              Untimed Charges    OT Eval/Re-eval Minutes 16  -IGNACIO              Total Minutes    Untimed Charges Total Minutes 16  -IGNACIO       Total Minutes 16  -IGNACIO            User Key  (r) = Recorded By, (t) = Taken By, (c) = Cosigned By    Initials Name Provider Type    Lizz Hoang OT Occupational Therapist              Therapy Charges for Today     Code Description Service Date Service Provider Modifiers Qty    [id number] HC OT EVAL LOW COMPLEXITY 2 12/9/2021 Lizz Solitario OT GO 1               Lizz Solitario OT  12/9/2021

## 2021-12-09 NOTE — PLAN OF CARE
Goal Outcome Evaluation:  Plan of Care Reviewed With: patient        Progress: no change  Outcome Summary: Patient has been alert to self and place at start of shift. As night progressed, patient was able to identify where she lives, that her son is currently in [place] and that she has another son in [place]. Patient states she has pain all over, especially with activity. PRN Tylenol given with positive effects. Patient voiding adequately. Urine specimen sent to lab. Vital signs stable. Call light in reach. Safety maintained. Heels elevated on pillows. Patient ate minimal amount this shift, drank adequately.

## 2021-12-09 NOTE — PLAN OF CARE
Goal Outcome Evaluation:  Plan of Care Reviewed With: patient, other (see comments) (RN, Kirsty)           Outcome Summary: Pt appears WFL for current diet of regular/thin, no changes to VQ after swallow, no issues noted.  SLP will sign off.  Please reconsult, if needed.

## 2021-12-09 NOTE — SIGNIFICANT NOTE
"   12/09/21 0144   OTHER   Discipline physical therapist; other (see comments)  (joyce rosado pt bedside, per pt report she is non ambulatory, hasnt ambulated in \"over year after my 1st stroke.\" Pt describes using a mechanical lift w lift pad for tsf OOB to chair at NH. Pt apparently baseline w skilled mobility, will defer eval.joyce rosado RN)   ..Patient was not wearing a face mask during this therapy encounter. Therapist used appropriate personal protective equipment including eye protection, mask, and gloves.  Mask used was standard procedure mask. Appropriate PPE was worn during the entire therapy session. Hand hygiene was completed before and after therapy session. Patient is not in enhanced droplet precautions.     "

## 2021-12-09 NOTE — CASE MANAGEMENT/SOCIAL WORK
Discharge Planning Assessment  [facility]     Patient Name: Jolanta Phipps  MRN: [id number]  Today's Date: 12/9/2021    Admit Date: 12/8/2021     Discharge Needs Assessment     Row Name 12/09/21 1037       Living Environment    Lives With facility resident    Current Living Arrangements extended care facility    Primary Care Provided by --  [facility]    Provides Primary Care For no one, unable/limited ability to care for self    Quality of Family Relationships involved; helpful; supportive    Able to Return to Prior Arrangements yes       Resource/Environmental Concerns    Resource/Environmental Concerns none    Transportation Concerns car, none       Transition Planning    Patient/Family Anticipates Transition to long-term care facility    Patient/Family Anticipated Services at Transition none    Transportation Anticipated health plan transportation       Discharge Needs Assessment    Readmission Within the Last 30 Days no previous admission in last 30 days    Concerns to be Addressed discharge planning    Anticipated Changes Related to Illness none               Discharge Plan     Row Name 12/09/21 1053       Plan    [facility]    Patient/Family in Agreement with Plan yes    Plan Comments CCP noted patient is confused and spoke with son/POA Cooper ([phone]). Introduced self and explained role. Cooper states [facility] from [facility] and he plans for patient to return at discharge. CCP noted PT notes and discussed with cooper that patient will likely need EMS for transportation to return. Explained that CCP cannot guarantee insurance coverage of EMS and he verbally acknowledges. He also gives consent for CCP to leave EMS time on VM if unable to reach him at time of discharge. CCP spoke with Fariha/[facility] who states patient is from personal care with no return requirements. She is checking on COVID test requirements and will notify CCP. Fariha states  [place] uses [facility] pharmacy and requests medications be e-scribed at discharge to [facility]. Patient is seen by GAMA Azevedo through advanced care house calls at [place]. Report number is [phone] and discharge summary can be faxed to [phone]. CCP following clinical course to assist with discharge planning needs. willy michele rn/ccp              Continued Care and Services - Admitted Since 12/8/2021    Coordination has not been started for this encounter.          Demographic Summary     Row Name 12/09/21 1034       General Information    Admission Type observation    Arrived From home    Required Notices Provided Observation Status Notice    Referral Source admission list    Reason for Consult discharge planning    Preferred Language English               Functional Status     Row Name 12/09/21 1035       Functional Status    Usual Activity Tolerance poor    Current Activity Tolerance poor       Functional Status, IADL    Medications completely dependent    Meal Preparation completely dependent    Housekeeping completely dependent    Laundry completely dependent    Shopping completely dependent               Psychosocial    No documentation.                Abuse/Neglect    No documentation.                Legal    No documentation.                Substance Abuse    No documentation.                Patient Forms    No documentation.                   Gracy Michele

## 2021-12-10 NOTE — PROGRESS NOTES
LOS: 0 days     Chief Complaint: E. coli diarrhea and UTI    Interval History: Patient reports she is having more pain in her left upper arm today.  Denies any fevers or chills.  Denies any nausea, vomiting, diarrhea.  Denies any abdominal pain or dysuria.    Vital Signs  Temp:  [97.5 °F (36.4 °C)-98.3 °F (36.8 °C)] 97.9 °F (36.6 °C)  Heart Rate:  [76-91] 84  Resp:  [20] 20  BP: (130-157)/(72-92) 130/92    Physical Exam:  General: In no acute distress  HEENT: Oropharynx clear, moist mucous membranes  Cardiovascular: RRR, normal S1 and S2, no M/R/G  Respiratory: Clear to ascultation bilaterally, no wheezing  GI: Soft, NT/ND, + bowel sounds bilaterally, no masses  Skin: No rashes or lesions  Extremities: No edema, cyanosis  Access: PIV    Antibiotics:  Anti-Infectives (From admission, onward)    Ordered     Dose/Rate Route Frequency Start Stop    12/10/21 0845  vancomycin 1500 mg/500 mL 0.9% NS IVPB ([facility])        Ordering Provider: Osbaldo Valentin DO    1,500 mg  over 1.5 Hours Intravenous Every 24 Hours 12/10/21 2100 12/17/21 2059    12/10/21 0839  vancomycin 2500 mg/500 mL 0.9% NS IVPB ([facility])        Ordering Provider: Osbaldo Valentin DO    20 mg/kg × 131 kg Intravenous Once 12/10/21 0930      12/10/21 0806  Pharmacy to dose vancomycin        Ordering Provider: Osbaldo Valentin DO     Does not apply Continuous PRN 12/10/21 0806 12/17/21 0805    12/08/21 1254  cefTRIAXone (ROCEPHIN) 1 g in sodium chloride 0.9 % 100 mL IVPB-VTB        Ordering Provider: Pb Gomez MD    1 g  200 mL/hr over 30 Minutes Intravenous Once 12/08/21 1256 12/08/21 1334           Results Review:     I reviewed the patient's new clinical results.    Lab Results   Component Value Date    WBC 8.56 12/10/2021    HGB 12.3 12/10/2021    HCT 38.4 12/10/2021    MCV 89.1 12/10/2021     12/10/2021     Lab Results   Component Value Date    GLUCOSE 231 (H) 12/10/2021    BUN 11 12/10/2021    CREATININE 0.60 12/10/2021     EGFRIFNONA 95 12/10/2021    BCR 18.3 12/10/2021    CO2 27.0 12/10/2021    CALCIUM 10.1 12/10/2021    ALBUMIN 3.00 (L) 12/08/2021    AST 12 12/08/2021    ALT 13 12/08/2021       Microbiology:  12/8 Covid negative  12/8 urine culture with Staph aureus   12/8 GI pathogen panel positive for Enteroaggregative E. coli     Radiology:  12/8 chest x-ray reviewed by me with no concern for pulmonary infiltrate.       Assessment/Plan   #Acute urinary tract infection  #Altered mental status  #E. coli diarrhea  #Diabetes complicating the above    We will transition patient from ceftriaxone to IV vancomycin today given her urine isolate has resulted with staph aureus.  We will plan to obtain blood cultures as well to rule out any bacteremia seeding the urine as staph in the urine is uncommon but cannot be reflective of bacteremia.    There is also a good probability of urinary contamination with staph as a possible cause given she was not on adequate staph coverage and has symptomatically improved however missing a bacteremia could carry high mortality still need to rule this out.    ID will follow.

## 2021-12-10 NOTE — PLAN OF CARE
Goal Outcome Evaluation:  Plan of Care Reviewed With: patient        Progress: improving  Outcome Summary: Confused but alert. No falls or injury. Feeds self without signs of aspiration. Large BM soft and no diarrhea  noted.Tylenol helped lt elbow pain.  Labial folds were pink and excoriated treated with barrier cream.

## 2021-12-10 NOTE — PROGRESS NOTES
"[facility]  Clinical Pharmacy Department     Vancomycin Pharmacokinetic Initial Consult Note    Jolanta Phipps is a 87 y.o. female who is on day 1 of pharmacy to dose vancomycin.    Indication: sepsis  Consulting Provider: Dr Valentin  Planned Duration of Therapy: 7 days  Loading Dose Ordered: 2.5 gm on 12/10 at 840  Culture/Source:   12/8 urine cx > 100K staph aureus pending final sens  12/8 GI panel + enteroaggregative E Coli  Target: -600 mg/L.hr   Other Antimicrobials: none    Vitals/Labs  Ht: 172.7 cm (68\"); Wt: 131 kg (289 lb 11 oz)  Temp Readings from Last 1 Encounters:   12/10/21 97.9 °F (36.6 °C) (Oral)    Estimated Creatinine Clearance: 70.9 mL/min (by C-G formula based on SCr of 0.6 mg/dL).     Results from last 7 days   Lab Units 12/10/21  0558 12/09/21  0611 12/08/21  1118   CREATININE mg/dL 0.60 0.53* 0.74   WBC 10*3/mm3 8.56 8.80 7.80     Assessment/Plan:    Vancomycin Dose:   1500 mg IV every  24  hours  Predictive AUC level for the dose ordered is 417 mg/L.hr, which is within the target of 400-600 mg/L.hr  Vanc Trough has been ordered for 12/12 at 2030     Pharmacy will follow patient's kidney function and will adjust doses and obtain levels as necessary. Thank you for involving pharmacy in this patient's care. Please contact pharmacy with any questions or concerns.                           Danielle Fontana, PharmD  Clinical Pharmacist  12/10/21     "

## 2021-12-10 NOTE — PROGRESS NOTES
Dedicated to Hospital Care    [phone]   LOS: 0 days     Name: Jolanta Phipps  Age/Sex: 87 y.o. female  :  1934        PCP: Lizz Sanchez APRN  Chief Complaint   Patient presents with   • Altered Mental Status   • Hyperglycemia      Subjective   Remains somewhat confused but unclear what her baseline level of mental status is.  Per nursing this may not be far from where she normally is.  She resides in a nursing home at baseline.  General: No Fever or Chills, Cardiac: No Chest Pain or Palpitations, Resp: No Cough or SOA, GI: No Nausea, Vomiting, or Diarrhea and Other: No bleeding    amLODIPine, 10 mg, Oral, Daily  apixaban, 5 mg, Oral, BID  bisacodyl, 10 mg, Rectal, Daily  cholecalciferol, 5,000 Units, Oral, Daily  cloNIDine, 0.1 mg, Oral, BID  DULoxetine, 60 mg, Oral, BID  insulin glargine, 25 Units, Subcutaneous, Q12H  insulin lispro, 0-14 Units, Subcutaneous, TID AC  insulin lispro, 5 Units, Subcutaneous, TID With Meals  levothyroxine, 100 mcg, Oral, Q AM  linagliptin, 5 mg, Oral, Daily  polyethylene glycol, 17 g, Oral, Daily  rosuvastatin, 20 mg, Oral, Nightly  senna-docusate sodium, 2 tablet, Oral, BID  sodium chloride, 10 mL, Intravenous, Q12H  vancomycin, 1,500 mg, Intravenous, Q24H  vancomycin, 20 mg/kg, Intravenous, Once  vitamin B-12, 1,000 mcg, Oral, Daily      Pharmacy to dose vancomycin,         Objective   Vital Signs  Temp:  [97.9 °F (36.6 °C)-98.3 °F (36.8 °C)] 98 °F (36.7 °C)  Heart Rate:  [76-84] 80  Resp:  [18-20] 18  BP: (130-157)/(71-92) 132/71  Body mass index is 44.05 kg/m².    Intake/Output Summary (Last 24 hours) at 12/10/2021 1339  Last data filed at 12/10/2021 1251  Gross per 24 hour   Intake 340 ml   Output 1400 ml   Net -1060 ml       Physical Exam  Vitals and nursing note reviewed.   Constitutional:       Appearance: Normal appearance.   HENT:      Head: Normocephalic and atraumatic.   Cardiovascular:      Rate and Rhythm: Normal rate and regular rhythm.   Pulmonary:       Effort: Pulmonary effort is normal. No respiratory distress.      Breath sounds: Normal breath sounds.   Abdominal:      General: Bowel sounds are normal.      Palpations: Abdomen is soft.   Neurological:      General: No focal deficit present.      Mental Status: She is alert.           Results Review:       I reviewed the patient's new clinical results.  Results from last 7 days   Lab Units 12/10/21  0558 12/09/21  0611 12/08/21  1118   WBC 10*3/mm3 8.56 8.80 7.80   HEMOGLOBIN g/dL 12.3 12.2 13.2   PLATELETS 10*3/mm3 279 236 250     Results from last 7 days   Lab Units 12/10/21  0558 12/09/21  0611 12/08/21  1118   SODIUM mmol/L 137 135* 136   POTASSIUM mmol/L 4.2 4.2 4.7   CHLORIDE mmol/L 100 102 99   CO2 mmol/L 27.0 29.4* 32.1*   BUN mg/dL 11 12 13   CREATININE mg/dL 0.60 0.53* 0.74   CALCIUM mg/dL 10.1 9.6 10.4   Estimated Creatinine Clearance: 70.9 mL/min (by C-G formula based on SCr of 0.6 mg/dL).  No results found for: HGBA1C  Glucose   Date/Time Value Ref Range Status   12/10/2021 1124 283 (H) 70 - 130 mg/dL Final     Comment:     Meter: [id number] : [id number] Lakeshia France    12/10/2021 0623 199 (H) 70 - 130 mg/dL Final     Comment:     Meter: [id number] : [id number] Huong LOPEZ   12/09/2021 2053 226 (H) 70 - 130 mg/dL Final     Comment:     Meter: [id number] : [id number] Huong LOPEZ   12/09/2021 1552 253 (H) 70 - 130 mg/dL Final     Comment:     Meter: [id number] : [id number] Edward Florian NA   12/09/2021 1121 209 (H) 70 - 130 mg/dL Final     Comment:     Meter: [id number] : [id number] Edward LOPEZ   12/09/2021 0608 165 (H) 70 - 130 mg/dL Final     Comment:     Meter: [id number] : [id number] Cira LOPEZ   12/08/2021 2046 213 (H) 70 - 130 mg/dL Final     Comment:     Meter: [id number] : [id number] Cira LOPEZ   12/08/2021 1847 246 (H) 70 - 130 mg/dL Final     Comment:     Meter: [id number] : [id number] Tierney Dueñas RN          Assessment/Plan   Active Hospital Problems    Diagnosis  POA   • Acute UTI [N39.0]  Yes   • Acute metabolic encephalopathy [G93.41]  Yes   • Diarrhea [R19.7]  Yes   • Cognitive communication deficit [R41.841]  Yes   • History of right MCA stroke [Z86.73]  Not Applicable   • Type 2 diabetes mellitus with hyperglycemia, with long-term current use of insulin (HCC) [E11.65, Z79.4]  Not Applicable   • Hypertension [I10]  Yes   • DNR (do not resuscitate) [Z66]  Yes   • Nursing home resident [Z59.3]  Not Applicable      Resolved Hospital Problems   No resolved problems to display.       PLAN  This is an 86 yo lady with a history of DM2, obesity, CVA, and HTN who presents from the nursing home with hyperglycemia and diarrhea  -found with Enteroaggregative E. coli on GI PCR; no treatment required appreciate ID input  -Urine cx growing staph aureus infectious disease has broaden antibiotic coverage to vancomycin.  -Not sure how off from her baseline she is from a mental status standpoint  -Continue 5 units of insulin with meals and increase basal insulin to 30 units twice a day she will require sliding scale insulin at discharge  -Eliquis continued and covers DVT prophylaxis  -DNR      Disposition  Back to SNF tomorrow if ok with ID      Ethan Smiley MD  [facility]  12/10/21  13:39 EST

## 2021-12-11 NOTE — PLAN OF CARE
Goal Outcome Evaluation:  Plan of Care Reviewed With: patient        Progress: no change  Outcome Summary: Patient has been alert and oriented with intermittent confusion. Pain has been more controlled this shift with PRN Tramadol and Bengay rub. Patient is on 1L o2 and toelrating well. Denies SOA. Patient voiding well. Ext cath in place. Incont of stool. Turned and repositioned by staff. Vital signs stable. Call light in reach. Safety maintained.

## 2021-12-11 NOTE — PROGRESS NOTES
Dedicated to Hospital Care    [phone]   LOS: 1 day     Name: Jolanta Phipps  Age/Sex: 87 y.o. female  :  1934        PCP: Lizz Sanchez APRN  Chief Complaint   Patient presents with   • Altered Mental Status   • Hyperglycemia      Subjective   At her baseline neurologically.  She is complaining of a lot of pain in her right knee today as well as her right calf.  She is also complaining of left shoulder pain.  The left shoulder pain is chronic and been diagnosed as osteoarthritis in the past.  She still complaining of pain in her right leg.  General: No Fever or Chills, Cardiac: No Chest Pain or Palpitations, Resp: No Cough or SOA, GI: No Nausea, Vomiting, or Diarrhea and Other: No bleeding    amLODIPine, 10 mg, Oral, Daily  apixaban, 5 mg, Oral, BID  bisacodyl, 10 mg, Rectal, Daily  cholecalciferol, 5,000 Units, Oral, Daily  cloNIDine, 0.1 mg, Oral, BID  DULoxetine, 60 mg, Oral, BID  insulin glargine, 30 Units, Subcutaneous, Q12H  insulin lispro, 0-14 Units, Subcutaneous, TID AC  insulin lispro, 5 Units, Subcutaneous, TID With Meals  levothyroxine, 100 mcg, Oral, Q AM  linagliptin, 5 mg, Oral, Daily  polyethylene glycol, 17 g, Oral, Daily  rosuvastatin, 20 mg, Oral, Nightly  senna-docusate sodium, 2 tablet, Oral, BID  sodium chloride, 10 mL, Intravenous, Q12H  vancomycin, 1,500 mg, Intravenous, Q24H  vitamin B-12, 1,000 mcg, Oral, Daily      Pharmacy to dose vancomycin,         Objective   Vital Signs  Temp:  [97.2 °F (36.2 °C)-98.3 °F (36.8 °C)] 98 °F (36.7 °C)  Heart Rate:  [64-86] 86  Resp:  [18] 18  BP: (112-132)/(52-80) 112/80  Body mass index is 43.74 kg/m².    Intake/Output Summary (Last 24 hours) at 2021 1152  Last data filed at 2021 0915  Gross per 24 hour   Intake 240 ml   Output 1350 ml   Net -1110 ml       Physical Exam  Vitals and nursing note reviewed.   Constitutional:       Appearance: Normal appearance.   HENT:      Head: Normocephalic and atraumatic.   Cardiovascular:       Rate and Rhythm: Normal rate and regular rhythm.   Pulmonary:      Effort: Pulmonary effort is normal. No respiratory distress.      Breath sounds: Normal breath sounds.   Abdominal:      General: Bowel sounds are normal.      Palpations: Abdomen is soft.   Musculoskeletal:      Comments: Exam of her right leg is limited by her body habitus she does have range of motion at the knee but is difficult to palpate any effusion given the morbid obesity.   Neurological:      General: No focal deficit present.      Mental Status: She is alert.           Results Review:       I reviewed the patient's new clinical results.  Results from last 7 days   Lab Units 12/11/21  0506 12/10/21  0558 12/09/21  0611 12/08/21  1118   WBC 10*3/mm3 9.25 8.56 8.80 7.80   HEMOGLOBIN g/dL 13.3 12.3 12.2 13.2   PLATELETS 10*3/mm3 298 279 236 250     Results from last 7 days   Lab Units 12/11/21  0506 12/10/21  0558 12/09/21  0611 12/08/21  1118   SODIUM mmol/L 139 137 135* 136   POTASSIUM mmol/L 4.1 4.2 4.2 4.7   CHLORIDE mmol/L 102 100 102 99   CO2 mmol/L 27.0 27.0 29.4* 32.1*   BUN mg/dL 10 11 12 13   CREATININE mg/dL 0.61 0.60 0.53* 0.74   CALCIUM mg/dL 10.2 10.1 9.6 10.4   Estimated Creatinine Clearance: 70.9 mL/min (by C-G formula based on SCr of 0.61 mg/dL).  No results found for: HGBA1C  Glucose   Date/Time Value Ref Range Status   12/11/2021 1125 251 (H) 70 - 130 mg/dL Final     Comment:     Meter: [id number] : [id number] Madalyn Marin NA   12/11/2021 0610 256 (H) 70 - 130 mg/dL Final     Comment:     Meter: [id number] : [id number] Araceli Garza NA   12/10/2021 2022 243 (H) 70 - 130 mg/dL Final     Comment:     Meter: [id number] : [id number] Araceli Garza NA   12/10/2021 1632 266 (H) 70 - 130 mg/dL Final     Comment:     Meter: [id number] : [id number] Clint Toledo RN   12/10/2021 1124 283 (H) 70 - 130 mg/dL Final     Comment:     Meter: [id number] : [id number] Lakeshia LOPEZ   12/10/2021 0623 199 (H) 70 -  130 mg/dL Final     Comment:     Meter: [id number] : [id number] Huong Hanks NA   12/09/2021 2053 226 (H) 70 - 130 mg/dL Final     Comment:     Meter: [id number] : [id number] Huong Hanks NA   12/09/2021 1552 253 (H) 70 - 130 mg/dL Final     Comment:     Meter: [id number] : [id number] DanaAydinKirkFernie Chiqui LOPEZ         Assessment/Plan   Active Hospital Problems    Diagnosis  POA   • Acute UTI [N39.0]  Yes   • Acute metabolic encephalopathy [G93.41]  Yes   • Diarrhea [R19.7]  Yes   • Cognitive communication deficit [R41.841]  Yes   • History of right MCA stroke [Z86.73]  Not Applicable   • Type 2 diabetes mellitus with hyperglycemia, with long-term current use of insulin (HCC) [E11.65, Z79.4]  Not Applicable   • Hypertension [I10]  Yes   • DNR (do not resuscitate) [Z66]  Yes   • Nursing home resident [Z59.3]  Not Applicable      Resolved Hospital Problems   No resolved problems to display.       PLAN  This is an 86 yo lady with a history of DM2, obesity, CVA, and HTN who presents from the nursing home with hyperglycemia and diarrhea  -found with Enteroaggregative E. coli on GI PCR; no treatment required appreciate ID input  -Urine cx growing staph aureus infectious disease has broaden antibiotic coverage to vancomycin.  Sensitivities are pending  -Not sure how off from her baseline she is from a mental status standpoint  -The right leg pain and calf tightness is difficult to assess.  We will check a right knee x-ray as well as a right lower extremity Doppler again her body habitus significantly limits the ability to do a high-quality physical exam.  -Continue 8 units of insulin with meals and increase basal insulin to 35 units twice a day she will require sliding scale insulin at discharge  -Eliquis continued and covers DVT prophylaxis  -DNR      Disposition  Back to SNF tomorrow if ok with ID      Ethan Smiley MD  [facility]  12/11/21  11:52 EST

## 2021-12-11 NOTE — PLAN OF CARE
Goal Outcome Evaluation:  Plan of Care Reviewed With: patient        Progress: no change  Outcome Summary: No change in assessment. No falls or injury. States pain improved with tramadol twice today. Sleeps between care but high pain with turns/bathing.

## 2021-12-11 NOTE — PLAN OF CARE
Goal Outcome Evaluation:  Plan of Care Reviewed With: patient           Outcome Summary: VSS; turn and reposition every 2 hours; monitor blood glucose; x-ray of right knee and venous doppler done today; IV antibiotics; continue to monitor patient  Problem: Adult Inpatient Plan of Care  Goal: Plan of Care Review  Outcome: Ongoing, Progressing  Flowsheets (Taken 12/11/2021 1704)  Plan of Care Reviewed With: patient  Outcome Summary:   VSS   turn and reposition every 2 hours   monitor blood glucose   x-ray of right knee and venous doppler done today   IV antibiotics   continue to monitor patient

## 2021-12-12 PROBLEM — E66.01 OBESITY, CLASS III, BMI 40-49.9 (MORBID OBESITY): Status: ACTIVE | Noted: 2021-01-01

## 2021-12-12 NOTE — PROGRESS NOTES
"[facility]  Clinical Pharmacy Department     Vancomycin Monitoring Note    Jolanta Phipps is a 87 y.o. female who is on day 3/7 of pharmacy to dose vancomycin for sepsis.    Previous Vancomycin Dose:  1500 mg IV every 24  hours  Updated Cultures and Sensitivities: urine - MSSA, blood culture pending  Lab Results   Component Value Date    [facility] 15.00 12/12/2021       Vitals/Labs  Ht: 172.7 cm (68\"); Wt: 133 kg (292 lb 12.3 oz)   Temp Readings from Last 1 Encounters:   12/12/21 98.2 °F (36.8 °C) (Oral)     Estimated Creatinine Clearance: 71.6 mL/min (by C-G formula based on SCr of 0.61 mg/dL).   Results from last 7 days   Lab Units 12/12/21  0828 12/11/21  0506 12/10/21  0558 12/09/21  0611 12/09/21  0611   CREATININE mg/dL  --  0.61 0.60  --  0.53*   WBC 10*3/mm3 9.04 9.25 8.56   < > 8.80    < > = values in this interval not displayed.     Assessment/Plan    Continue current Vancomycin Dose: 1500 mg IV every  24  hours; provides a predicted  mg/L.hr   Next Level Date and Time: will be ordered if renal function changes  We will continue to monitor patient changes and renal function     Thank you for involving pharmacy in this patient's care. Please contact pharmacy with any questions or concerns.       Hiren Cope PharmD        "

## 2021-12-12 NOTE — PLAN OF CARE
Goal Outcome Evaluation:  Plan of Care Reviewed With: patient        Progress: no change  Outcome Summary: Patient has been alert and oriented this shift. Complaints of pain to right knee and left elbow. Bengay cream has been applied x2 this shift. PRN pain medication given with positive effects noted. Patient turned and repositioned by staff q2-3 hours. Purewick in place. No BM this shift. Darrick heels elevated. XRAY of knee results without acute injury. Doppler indicates No thrombus. Patient sat up until 0100, had snack and watching television. Mood is pleasant and cooperative. Vital signs stable. Call light in reach. Safety maintained. Hopeful of discharge back to [facility] if cleared by ID.

## 2021-12-12 NOTE — PLAN OF CARE
Goal Outcome Evaluation:  Plan of Care Reviewed With: patient        Progress: improving  Outcome Summary: VSS; patient complains of some chronic pain; pain med given; pt restarted back on lasix 20mg daily today; hopefully back to nursing facility soon; continue to monitor  Problem: Adult Inpatient Plan of Care  Goal: Plan of Care Review  Outcome: Ongoing, Progressing  Flowsheets  Taken 12/12/2021 1815 by Lesli Traylor RN  Progress: improving  Outcome Summary:   VSS   patient complains of some chronic pain   pain med given   pt restarted back on lasix 20mg daily today   hopefully back to nursing facility soon   continue to monitor  Taken 12/12/2021 0411 by Rebecca Gao RN  Plan of Care Reviewed With: patient

## 2021-12-12 NOTE — PROGRESS NOTES
Name: Jolanta Phipps ADMIT: 2021   : 1934  PCP: Lizz Sanchez APRN    MRN: [id number] LOS: 2 days   AGE/SEX: 87 y.o. female  ROOM: [facility]     Subjective   Subjective   Sitting up in bed, appears very comfortable.  No complaints currently.  Does not mention any knee pain    Review of Systems  Denies chest pain, shortness of breath, abdominal pain or nausea     Objective   Objective   Vital Signs  Temp:  [98.1 °F (36.7 °C)-98.5 °F (36.9 °C)] 98.2 °F (36.8 °C)  Heart Rate:  [75-81] 78  Resp:  [18] 18  BP: (137-151)/(64-74) 140/70  SpO2:  [95 %-98 %] 95 %  on  Flow (L/min):  [1-1.5] 1.5;   Device (Oxygen Therapy): nasal cannula  Body mass index is 44.52 kg/m².  Physical Exam  Constitutional:       General: She is not in acute distress.     Appearance: She is obese. She is not ill-appearing.   HENT:      Mouth/Throat:      Mouth: Mucous membranes are moist.   Eyes:      General: No scleral icterus.  Cardiovascular:      Rate and Rhythm: Normal rate and regular rhythm.   Pulmonary:      Effort: Pulmonary effort is normal. No respiratory distress.      Breath sounds: No wheezing or rhonchi.   Abdominal:      Palpations: Abdomen is soft.      Tenderness: There is no abdominal tenderness. There is no guarding.   Musculoskeletal:      Comments: 2+ pitting pedal edema bilaterally   Skin:     General: Skin is warm and dry.   Neurological:      Mental Status: She is alert.      Comments: Oriented to person and place.  Not to month or year   Psychiatric:         Mood and Affect: Mood normal.         Results Review     I reviewed the patient's new clinical results.  Results from last 7 days   Lab Units 21  0821  0506 12/10/21  0558 21  0611   WBC 10*3/mm3 9.04 9.25 8.56 8.80   HEMOGLOBIN g/dL 13.2 13.3 12.3 12.2   PLATELETS 10*3/mm3 314 298 279 236     Results from last 7 days   Lab Units 21  0828 21  0506 12/10/21  0558 21  0611   SODIUM mmol/L 137 139 137 135*   POTASSIUM  mmol/L 4.6 4.1 4.2 4.2   CHLORIDE mmol/L 101 102 100 102   CO2 mmol/L 26.1 27.0 27.0 29.4*   BUN mg/dL 11 10 11 12   CREATININE mg/dL 0.78 0.61 0.60 0.53*   GLUCOSE mg/dL 239* 218* 231* 171*   Estimated Creatinine Clearance: 71.6 mL/min (by C-G formula based on SCr of 0.78 mg/dL).  Results from last 7 days   Lab Units 12/08/21  1118   ALBUMIN g/dL 3.00*   BILIRUBIN mg/dL 0.4   ALK PHOS U/L 85   AST (SGOT) U/L 12   ALT (SGPT) U/L 13     Results from last 7 days   Lab Units 12/12/21  0828 12/11/21  0506 12/10/21  0558 12/09/21  0611 12/08/21  1118 12/08/21  1118   CALCIUM mg/dL 10.2 10.2 10.1 9.6   < > 10.4   ALBUMIN g/dL  --   --   --   --   --  3.00*    < > = values in this interval not displayed.       COVID19   Date Value Ref Range Status   12/08/2021 Not Detected Not Detected - Ref. Range Final     Glucose   Date/Time Value Ref Range Status   12/12/2021 0608 259 (H) 70 - 130 mg/dL Final     Comment:     Meter: [id number] : [id number] Araceli Greg NA   12/11/2021 2028 242 (H) 70 - 130 mg/dL Final     Comment:     Meter: [id number] : [id number] Windham Greg NA   12/11/2021 1651 191 (H) 70 - 130 mg/dL Final     Comment:     Meter: [id number] : [id number] [facility] Tania NA   12/11/2021 1125 251 (H) 70 - 130 mg/dL Final     Comment:     Meter: [id number] : [id number] [facility] Tania NA   12/11/2021 0610 256 (H) 70 - 130 mg/dL Final     Comment:     Meter: [id number] : [id number] Araceli Greg NA   12/10/2021 2022 243 (H) 70 - 130 mg/dL Final     Comment:     Meter: [id number] : [id number] Araceli LOPEZ   12/10/2021 1632 266 (H) 70 - 130 mg/dL Final     Comment:     Meter: [id number] : [id number] Clint Toledo RN       Duplex Venous Lower Extremity - Right CAR  · Normal right lower extremity venous duplex scan.     XR Knee 1 or 2 View Right  THREE-VIEW RIGHT KNEE     HISTORY: Knee pain.     FINDINGS: There is extremely severe diffuse osteoporosis which obscures  bony detail. There  appears to be chronic fracture deformity of the  distal shaft of the femur with considerable callus formation. I cannot  identify a residual fracture line but the degree of osteoporosis could  certainly obscure acute fracture or nonunited fracture in this region  and further evaluation with CT scan may be required. There are moderate  degenerative changes at the knee and no definite joint effusion is seen.     This report was finalized on 12/11/2021 4:41 PM by Dr. Kirk Moreira M.D.       I reviewed the patient's daily medications.  Scheduled Medications  amLODIPine, 10 mg, Oral, Daily  apixaban, 5 mg, Oral, BID  bisacodyl, 10 mg, Rectal, Daily  cholecalciferol, 5,000 Units, Oral, Daily  cloNIDine, 0.1 mg, Oral, BID  DULoxetine, 60 mg, Oral, BID  insulin glargine, 35 Units, Subcutaneous, Q12H  insulin lispro, 0-14 Units, Subcutaneous, TID AC  insulin lispro, 8 Units, Subcutaneous, TID With Meals  levothyroxine, 100 mcg, Oral, Q AM  linagliptin, 5 mg, Oral, Daily  polyethylene glycol, 17 g, Oral, Daily  rosuvastatin, 20 mg, Oral, Nightly  senna-docusate sodium, 2 tablet, Oral, BID  sodium chloride, 10 mL, Intravenous, Q12H  vancomycin, 1,500 mg, Intravenous, Q24H  vitamin B-12, 1,000 mcg, Oral, Daily    Infusions  Pharmacy to dose vancomycin,     Diet  Diet Regular; Consistent Carbohydrate       Assessment/Plan     Active Hospital Problems    Diagnosis  POA   • **Acute metabolic encephalopathy [G93.41]  Yes   • Obesity, Class III, BMI 40-49.9 (morbid obesity) ([facility]) [E66.01]  Yes   • Acute UTI [N39.0]  Yes   • Diarrhea [R19.7]  Yes   • Cognitive communication deficit [R41.841]  Yes   • History of right MCA stroke [Z86.73]  Not Applicable   • Type 2 diabetes mellitus with hyperglycemia, with long-term current use of insulin ([facility]) [E11.65, Z79.4]  Not Applicable   • Hypertension [I10]  Yes   • DNR (do not resuscitate) [Z66]  Yes   • Nursing home resident [Z59.3]  Not Applicable      Resolved Hospital Problems   No  resolved problems to display.       87 y.o. female with history of diabetes, prior stroke and hypertension who was sent in from her nursing home for hyperglycemia and diarrhea.  She was found to have an enteroaggregative E. Coli and staph aureus UTI.    Staph aureus UTI  -Infectious disease following.  On vancomycin.  Blood cultures no growth to date    Diarrhea due to enteroaggregative E. Coli  -Supportive care.  No further episodes of diarrhea     Right knee pain  -X-ray shows severe osteoporosis and cannot rule out a fracture.  Will get CT of the knee to better assess    Type 2 diabetes with hyperglycemia  -Basal and preprandial insulin increased yesterday.  Will wait 24 hours before further medication titration  -Plan to discharge with basal and sliding scale insulin, continue Tradjenta    Bilateral pedal edema  -She has been bedbound since about February 2021 after right femur fracture, so this seems likely dependent edema.  -trial low-dose Lasix    · Eliquis (home med) for DVT prophylaxis.  · DNR.  · Discussed with patient.  · Anticipate discharge back to SNU facility tomorrow.      Kesha Sewell [facility]  12/12/21  12:42 EST    Patient was placed in face mask on first look.  I wore protective equipment throughout this patient encounter including a face mask, gloves and protective eyewear.  Hand hygiene was performed before donning protective equipment and after removal when leaving the room.

## 2021-12-13 NOTE — PROGRESS NOTES
LOS: 3 days     Chief Complaint: E. coli diarrhea and UTI    Interval History: Patient woke from sleep and is largely confused this morning.  Tangential thoughts with difficult to understand speech due to mumbling.  Does deny any fevers or chills.  Further review of systems was unable to be obtained due to mental status.    Vital Signs  Temp:  [97.8 °F (36.6 °C)-98.3 °F (36.8 °C)] 98 °F (36.7 °C)  Heart Rate:  [83-91] 83  Resp:  [18-20] 18  BP: (125-148)/(68-93) 125/71    Physical Exam:  General: In no acute distress  HEENT: Oropharynx clear, moist mucous membranes  Cardiovascular: RRR, normal S1 and S2, no M/R/G  Respiratory: Clear to ascultation bilaterally, no wheezing  GI: Soft, NT/ND, + bowel sounds bilaterally, no masses  Skin: No rashes or lesions  Extremities: No edema, cyanosis  Access: PIV    Antibiotics:  Anti-Infectives (From admission, onward)    Ordered     Dose/Rate Route Frequency Start Stop    12/10/21 0845  vancomycin 1500 mg/500 mL 0.9% NS IVPB ([facility])        Ordering Provider: Osbaldo Valentin DO    1,500 mg  over 1.5 Hours Intravenous Every 24 Hours 12/11/21 1200 12/18/21 1159    12/10/21 0839  vancomycin 2500 mg/500 mL 0.9% NS IVPB ([facility])        Ordering Provider: Osbaldo Valentin DO    20 mg/kg × 131 kg Intravenous Once 12/10/21 0930 12/10/21 1535    12/10/21 0806  Pharmacy to dose vancomycin        Ordering Provider: Osbaldo Valentin DO     Does not apply Continuous PRN 12/10/21 0806 12/17/21 0805    12/08/21 1254  cefTRIAXone (ROCEPHIN) 1 g in sodium chloride 0.9 % 100 mL IVPB-VTB        Ordering Provider: Pb Gomez MD    1 g  200 mL/hr over 30 Minutes Intravenous Once 12/08/21 1256 12/08/21 1334           Results Review:     I reviewed the patient's new clinical results.    Lab Results   Component Value Date    WBC 9.04 12/12/2021    HGB 13.2 12/12/2021    HCT 40.7 12/12/2021    MCV 87.5 12/12/2021     12/12/2021     Lab Results   Component Value Date     GLUCOSE 194 (H) 12/13/2021    BUN 13 12/13/2021    CREATININE 0.88 12/13/2021    EGFRIFNONA 61 12/13/2021    BCR 14.8 12/13/2021    CO2 25.2 12/13/2021    CALCIUM 10.1 12/13/2021    ALBUMIN 3.00 (L) 12/08/2021    AST 12 12/08/2021    ALT 13 12/08/2021       Microbiology:  12/8 Covid negative  12/8 urine culture with Staph aureus   12/8 GI pathogen panel positive for Enteroaggregative E. coli     Radiology:  12/8 chest x-ray with no concern for pulmonary infiltrate.     Assessment/Plan   #Acute urinary tract infection  #Altered mental status  #E. coli diarrhea  #Diabetes complicating the above    At this point blood cultures remain negative and I suspect that they will likely remain negative at this point.  We will plan to discontinue antibiotic therapy after today as she completed 3 days of staph targeted therapy for cystitis.    We will plan no antibiotic therapy for E. coli diarrhea as care is largely supportive and resolves on its own.    Thank you for allowing me to be involved in the care of this patient. Infectious diseases will sign off at this time with antibiotics plan in place, but please call me at [phone] if any further ID questions or new ID concerns.

## 2021-12-13 NOTE — CASE MANAGEMENT/SOCIAL WORK
Continued Stay Note  [facility]     Patient Name: Jolanta Phipps  MRN: [id number]  Today's Date: 12/13/2021    Admit Date: 12/8/2021     Discharge Plan     Row Name 12/13/21 1525       Plan    [facility]    Patient/Family in Agreement with Plan yes    Plan Comments DC orders in [facility].  Spoke with Fariha/Haresh can accept patient back.  Spoke with son Miguel and he is agreeable.  [facility] scheduled for 4:00 p.m.  Packet to [facility].  BHumeniuk RN                       Expected Discharge Date and Time     Expected Discharge Date Expected Discharge Time    Dec 13, 2021             Becky S. Humeniuk, RN

## 2021-12-13 NOTE — PLAN OF CARE
Goal Outcome Evaluation:  Plan of Care Reviewed With: patient        Progress: no change  Outcome Summary: Patient has been alert and oriented with episodes of forgetfulness. During walking rounds, found patient to be attempting to get oob. This RN and tech assisted patient back bed. Patient stated she was going to try and walk. RN asked patient last time she walked and patient stated over a year ago. She stated she just wanted to try and see if she could. Patient complaining of knee pain once she was adjusted back in to bed. PRN Ultram given with positive effects. Voiding well. No BM this shift. Vital signs stable. Patient has maintained sats >90% on room air. Call light in reach. Safety maintained.

## 2021-12-13 NOTE — PROGRESS NOTES
"[facility]  Clinical Pharmacy Department     Vancomycin Monitoring Note    Jolanta Phipps is a 87 y.o. female who is on day 4 of pharmacy to dose vancomycin for sepsis.    Previous Vancomycin Dose:  1500 mg IV every 24  hours  Updated Cultures and Sensitivities: urine - MSSA, blood culture pending  Lab Results   Component Value Date    [facility]       Vitals/Labs  Ht: 172.7 cm (68\"); Wt: 133 kg (292 lb 12.3 oz)   Temp Readings from Last 1 Encounters:   12/12/21 98.2 °F (36.8 °C) (Oral)     Estimated Creatinine Clearance: 71.6 mL/min (by C-G formula based on SCr of 0.61 mg/dL).   Results from last 7 days   Lab Units 12/12/21  0828 12/11/21  0506 12/10/21  0558 12/09/21  0611 12/09/21  0611   CREATININE mg/dL  --  0.61 0.60  --  0.53*   WBC 10*3/mm3 9.04 9.25 8.56   < > 8.80    < > = values in this interval not displayed.     Assessment/Plan    Change Vancomycin Dose: 1250 mg IV every  24  hours; provides a predicted  mg/L.hr   Next Level Date and Time: n/a      Thank you for involving pharmacy in this patient's care. Since completed will close out pharmacy to dose consult.     Padilla [facility]    "

## 2021-12-13 NOTE — DISCHARGE SUMMARY
"    Patient Name: Jolanta Phipps  : 1934  MRN: [id number]    Date of Admission: 2021  Date of Discharge:  2021  Primary Care Physician: Lizz Sanchez APRN      Chief Complaint:   Altered Mental Status and Hyperglycemia      Discharge Diagnoses     Active Hospital Problems    Diagnosis  POA   • **Acute metabolic encephalopathy [G93.41]  Yes   • Obesity, Class III, BMI 40-49.9 (morbid obesity) ([facility]) [E66.01]  Yes   • Acute UTI [N39.0]  Yes   • Diarrhea [R19.7]  Yes   • Cognitive communication deficit [R41.841]  Yes   • History of right MCA stroke [Z86.73]  Not Applicable   • Type 2 diabetes mellitus with hyperglycemia, with long-term current use of insulin ([facility]) [E11.65, Z79.4]  Not Applicable   • Hypertension [I10]  Yes   • DNR (do not resuscitate) [Z66]  Yes   • Nursing home resident [Z59.3]  Not Applicable      Resolved Hospital Problems   No resolved problems to display.        Hospital Course     Ms. Phipps is a 87 y.o. female with history of diabetes, prior stroke and hypertension who was sent in from her nursing home for hyperglycemia and diarrhea.  She was found to have an enteroaggregative E. Coli and staph aureus UTI.     Staph aureus UTI: She was evaluated by Infectious disease. She was treated with 3 days of vancomycin.  Blood cultures have no growth to date     Diarrhea due to enteroaggregative E. Coli: She was treated with fluids and supportive care. Her diarrhea has resolved.     Type 2 diabetes with hyperglycemia: Her basal insulin was increased to 30 units twice daily and added sliding scale insulin. Continue Tradjenta. She should follow up with her primary care for further medication titration.      Right knee pain: CT lower extremity showed \"Chronic, impacted distal right femoral  diametaphysis fracture is not united except for a band of solid appearing bridging periosteal new bone posteriorly\".  She is bedbound.  Outpatient follow-up.     Bilateral pedal edema: She has been " bedbound since February 2021 after right femur fracture, so this is likely dependent edema. She was started on low-dose Lasix. She needs to follow up with her primary care if no improvement.     Day of Discharge     Subjective:  Sitting up in bed, feeding herself cheerios. Pleasant and conversant. No new complaints. Ready to go back to her facility today.    Review of Systems   Constitutional: Negative for chills and fever.   Respiratory: Negative for cough and shortness of breath.    Cardiovascular: Negative for chest pain, palpitations and leg swelling.   Gastrointestinal: Negative for abdominal pain, blood in stool, nausea and vomiting.   Genitourinary: Negative for dysuria and hematuria.       Physical Exam:  Temp:  [97.8 °F (36.6 °C)-98.3 °F (36.8 °C)] 98 °F (36.7 °C)  Heart Rate:  [81-91] 81  Resp:  [18-20] 18  BP: (125-148)/(68-93) 125/71  Body mass index is 44.65 kg/m².  Physical Exam  Constitutional:       General: She is not in acute distress.     Appearance: She is obese. She is not ill-appearing.   HENT:      Mouth/Throat:      Mouth: Mucous membranes are moist.   Eyes:      General: No scleral icterus.  Cardiovascular:      Rate and Rhythm: Normal rate and regular rhythm.   Pulmonary:      Effort: Pulmonary effort is normal. No respiratory distress.      Breath sounds: No wheezing or rhonchi.   Abdominal:      Palpations: Abdomen is soft.      Tenderness: There is no abdominal tenderness. There is no guarding.   Musculoskeletal:      Comments: 2+ pitting pedal edema bilaterally   Skin:     General: Skin is warm and dry.   Neurological:      Mental Status: She is alert.      Comments: Oriented to person and place.  Not to month or year   Psychiatric:         Mood and Affect: Mood normal.         Consultants     Consult Orders (all) (From admission, onward)     Start     Ordered    12/09/21 1000  Inpatient Spiritual Care Consult  Once        Provider:  (Not yet assigned)    12/08/21 2228    12/09/21 0702   Inpatient Infectious Diseases Consult  IN AM        Comments: Call in am   Specialty:  Infectious Diseases  Provider:  Caitie Tovar MD    12/08/21 2236 12/08/21 2228  Inpatient Palliative Care Nurse Consult  Once,   Status:  Canceled        Comments: Middle son named, oldest son upset/hurt/feels disrespected   Provider:  (Not yet assigned)    12/08/21 2228 12/08/21 1718  Inpatient Consult to Case Management   Once        Provider:  (Not yet assigned)    12/08/21 1720    12/08/21 1630  Inpatient Case Management  Consult  Once        Provider:  (Not yet assigned)    12/08/21 1629    12/08/21 1255  [facility] (on-call MD unless specified) Details  Once,   Status:  Canceled        Specialty:  Hospitalist  Provider:  Sera Silveira MD    12/08/21 1254              Procedures     Imaging Results (All)     Procedure Component Value Units Date/Time    CT Lower Extremity Right Without Contrast [[id number]] Resulted: 12/13/21 0855     Updated: 12/13/21 0857    XR Knee 1 or 2 View Right [[id number]] Collected: 12/11/21 1638     Updated: 12/11/21 1644    Narrative:      THREE-VIEW RIGHT KNEE     HISTORY: Knee pain.     FINDINGS: There is extremely severe diffuse osteoporosis which obscures  bony detail. There appears to be chronic fracture deformity of the  distal shaft of the femur with considerable callus formation. I cannot  identify a residual fracture line but the degree of osteoporosis could  certainly obscure acute fracture or nonunited fracture in this region  and further evaluation with CT scan may be required. There are moderate  degenerative changes at the knee and no definite joint effusion is seen.     This report was finalized on 12/11/2021 4:41 PM by Dr. Kirk Moreira M.D.       XR Chest 1 View [[id number]] Collected: 12/08/21 1223     Updated: 12/08/21 1227    Narrative:      XR CHEST 1 VW-     HISTORY:  Cough, weakness.     COMPARISON:  Chest radiograph 04/16/2021.      FINDINGS:    A single portable view of the chest was obtained. The cardiac silhouette  is normal in size. There is calcific aortic atherosclerosis. Hilar  contours are not significantly changed. Lungs and pleural spaces are  clear. There is multilevel degenerative disc disease. There is bilateral  glenohumeral osteoarthritis.     This report was finalized on 12/8/2021 12:24 PM by Dr. Radha Cleaning M.D.             Pertinent Labs     Results from last 7 days   Lab Units 12/12/21 0828 12/11/21  0506 12/10/21  0558 12/09/21  0611   WBC 10*3/mm3 9.04 9.25 8.56 8.80   HEMOGLOBIN g/dL 13.2 13.3 12.3 12.2   PLATELETS 10*3/mm3 314 298 279 236     Results from last 7 days   Lab Units 12/13/21  0539 12/12/21  0828 12/11/21  0506 12/10/21  0558   SODIUM mmol/L 136 137 139 137   POTASSIUM mmol/L 4.3 4.6 4.1 4.2   CHLORIDE mmol/L 102 101 102 100   CO2 mmol/L 25.2 26.1 27.0 27.0   BUN mg/dL 13 11 10 11   CREATININE mg/dL 0.88 0.78 0.61 0.60   GLUCOSE mg/dL 194* 239* 218* 231*   Estimated Creatinine Clearance: 65.1 mL/min (by C-G formula based on SCr of 0.88 mg/dL).  Results from last 7 days   Lab Units 12/08/21  1118   ALBUMIN g/dL 3.00*   BILIRUBIN mg/dL 0.4   ALK PHOS U/L 85   AST (SGOT) U/L 12   ALT (SGPT) U/L 13     Results from last 7 days   Lab Units 12/13/21  0539 12/12/21  0828 12/11/21  0506 12/10/21  0558 12/09/21  0611 12/08/21  1118   CALCIUM mg/dL 10.1 10.2 10.2 10.1   < > 10.4   ALBUMIN g/dL  --   --   --   --   --  3.00*   MAGNESIUM mg/dL 1.7  --   --   --   --   --    PHOSPHORUS mg/dL 3.0  --   --   --   --   --     < > = values in this interval not displayed.       Results from last 7 days   Lab Units 12/09/21  0611   PROBNP pg/mL 144.5     Results from last 7 days   Lab Units 12/09/21  0107   CREATININE UR mg/dL 41.7   PROTEIN TOTAL URINE mg/dL 113.0   PROT/CREAT RATIO UR mg/G Crea 2,709.8*         Invalid input(s): LDLCALC  Results from last 7 days   Lab Units 12/10/21  1533 12/10/21  1530 12/08/21  1149    BLOODCX  No growth at 2 days No growth at 2 days  --    URINECX   --   --  >100,000 CFU/mL Staphylococcus aureus*       Test Results Pending at Discharge     Pending Labs     Order Current Status    Blood Culture - Blood, Arm, Right Preliminary result    Blood Culture - Blood, Arm, Right Preliminary result          Discharge Details        Discharge Medications      New Medications      Instructions Start Date   furosemide 20 MG tablet  Commonly known as: LASIX   20 mg, Oral, Daily   Start Date: December 14, 2021     insulin lispro 100 UNIT/ML injection  Commonly known as: ADMELOG   0-14 Units, Subcutaneous, 3 Times Daily Before Meals         Changes to Medications      Instructions Start Date   insulin glargine 100 UNIT/ML injection  Commonly known as: LANVIRAL RUTHGLEE  What changed: how much to take   30 Units, Subcutaneous, 2 Times Daily         Continue These Medications      Instructions Start Date   acetaminophen 650 MG 8 hr tablet  Commonly known as: TYLENOL   650 mg, Oral, Every 8 Hours PRN      amLODIPine 10 MG tablet  Commonly known as: NORVASC   10 mg, Oral, Daily      apixaban 5 MG tablet tablet  Commonly known as: ELIQUIS   5 mg, Oral, 2 Times Daily      cloNIDine 0.1 MG tablet  Commonly known as: CATAPRES   0.1 mg, Oral, 2 Times Daily      Diclofenac Sodium 1 % gel gel  Commonly known as: VOLTAREN   4 g, Topical, 4 Times Daily PRN      Dulcolax 10 MG suppository  Generic drug: bisacodyl   10 mg, Rectal, Daily      DULoxetine 60 MG capsule  Commonly known as: CYMBALTA   60 mg, Oral, 2 Times Daily      levothyroxine 100 MCG tablet  Commonly known as: SYNTHROID, LEVOTHROID   100 mcg, Oral, Daily      linagliptin 5 MG tablet tablet  Commonly known as: TRADJENTA   5 mg, Oral, Daily      MiraLax 17 g packet  Generic drug: polyethylene glycol   17 g, Oral, Daily      muscle rub 10-15 % cream cream   1 application, Topical, Every 4 Hours PRN, Apply to Left Shoulder       ondansetron 4 MG tablet  Commonly  known as: ZOFRAN   4 mg, Oral, Every 8 Hours PRN      rosuvastatin 20 MG tablet  Commonly known as: CRESTOR   20 mg, Daily      traMADol 50 MG tablet  Commonly known as: ULTRAM   50 mg, Oral, Every 6 Hours PRN      vitamin B-12 1000 MCG tablet  Commonly known as: CYANOCOBALAMIN   1,000 mcg, Oral, Daily      vitamin D3 125 MCG (5000 UT) capsule capsule   5,000 Units, Oral, Daily             Allergies   Allergen Reactions   • Aldactone [Spironolactone] Unknown - Low Severity   • Atenolol Unknown - Low Severity   • Atorvastatin Unknown - Low Severity   • Cholestyramine Unknown - Low Severity   • Ezetimibe Unknown - Low Severity   • Hctz [Hydrochlorothiazide] Unknown - Low Severity   • Lisinopril Unknown - Low Severity   • Losartan Unknown - Low Severity   • Lyrica [Pregabalin] Unknown - Low Severity   • Metformin Unknown - Low Severity   • Pork-Derived Products GI Intolerance   • Sulfa Antibiotics Unknown - Low Severity   • Tapentadol Unknown - Low Severity   • Valsartan Unknown - Low Severity         Discharge Disposition:  Skilled Nursing Facility (DC - External)    Discharge Diet:  Diet Order   Procedures   • Diet Regular; Consistent Carbohydrate       Discharge Activity:   As tolerated    CODE STATUS:    Code Status and Medical Interventions:   Ordered at: 12/08/21 1721     Medical Intervention Limits:    NO intubation (DNI)    NO cardioversion     Code Status (Patient has no pulse and is not breathing):    No CPR (Do Not Attempt to Resuscitate)     Medical Interventions (Patient has pulse or is breathing):    Limited Support       No future appointments.   Follow-up Information     Lizz Sanchez APRN. Schedule an appointment as soon as possible for a visit in 1 week(s).    Specialty: Nurse Practitioner  Contact information:  [address] [facility]  [address] [place], [place] [id number] [phone]                         Time Spent on Discharge:  Greater than 30 minutes      DO Sherly Carlisle  [facility]  12/13/21  11:11 EST

## 2021-12-14 NOTE — CASE MANAGEMENT/SOCIAL WORK
Case Management Discharge Note      Final Note: DC'd to [facility] 12/13         Selected Continued Care - Discharged on 12/13/2021 Admission date: 12/8/2021 - Discharge disposition: Skilled Nursing Facility (DC - External)    Destination Coordination complete.    Service Provider Selected Services Address Phone Fax Patient Preferred    [facility]  [facility] [address] [facility] [address] [phone] --                      Transportation Services  Ambulance:  ([place])    Final Discharge Disposition Code: 01 - home or self-care

## 2022-01-01 ENCOUNTER — APPOINTMENT (OUTPATIENT)
Dept: CARDIOLOGY | Facility: HOSPITAL | Age: 87
End: 2022-01-01

## 2022-01-01 ENCOUNTER — APPOINTMENT (OUTPATIENT)
Dept: GENERAL RADIOLOGY | Facility: HOSPITAL | Age: 87
End: 2022-01-01

## 2022-01-01 ENCOUNTER — HOSPITAL ENCOUNTER (OUTPATIENT)
Facility: HOSPITAL | Age: 87
Setting detail: OBSERVATION
Discharge: NURSING, REHAB OR LONG-TERM CARE | End: 2022-09-12
Attending: PEDIATRICS | Admitting: INTERNAL MEDICINE

## 2022-01-01 ENCOUNTER — HOSPITAL ENCOUNTER (INPATIENT)
Facility: HOSPITAL | Age: 87
LOS: 2 days | End: 2022-12-04
Attending: EMERGENCY MEDICINE | Admitting: HOSPITALIST

## 2022-01-01 ENCOUNTER — APPOINTMENT (OUTPATIENT)
Dept: CT IMAGING | Facility: HOSPITAL | Age: 87
End: 2022-01-01

## 2022-01-01 VITALS
OXYGEN SATURATION: 68 % | DIASTOLIC BLOOD PRESSURE: 69 MMHG | BODY MASS INDEX: 45.99 KG/M2 | TEMPERATURE: 101.8 F | WEIGHT: 293 LBS | SYSTOLIC BLOOD PRESSURE: 118 MMHG | HEIGHT: 67 IN

## 2022-01-01 VITALS
OXYGEN SATURATION: 96 % | BODY MASS INDEX: 47.09 KG/M2 | SYSTOLIC BLOOD PRESSURE: 137 MMHG | HEIGHT: 66 IN | RESPIRATION RATE: 16 BRPM | HEART RATE: 70 BPM | TEMPERATURE: 97.1 F | DIASTOLIC BLOOD PRESSURE: 69 MMHG | WEIGHT: 293 LBS

## 2022-01-01 DIAGNOSIS — E87.70 HYPERVOLEMIA, UNSPECIFIED HYPERVOLEMIA TYPE: Primary | ICD-10-CM

## 2022-01-01 DIAGNOSIS — Z16.30 INFECTION OF DRUG-RESISTANT BACTERIA: ICD-10-CM

## 2022-01-01 DIAGNOSIS — R09.02 HYPOXIA: ICD-10-CM

## 2022-01-01 DIAGNOSIS — A49.9 INFECTION OF DRUG-RESISTANT BACTERIA: ICD-10-CM

## 2022-01-01 DIAGNOSIS — N39.0 UTI (URINARY TRACT INFECTION), UNCOMPLICATED: Primary | ICD-10-CM

## 2022-01-01 LAB
ALBUMIN SERPL-MCNC: 3.3 G/DL (ref 3.5–5.2)
ALBUMIN SERPL-MCNC: 3.4 G/DL (ref 3.5–5.2)
ALBUMIN/GLOB SERPL: 1.1 G/DL
ALBUMIN/GLOB SERPL: 1.2 G/DL
ALP SERPL-CCNC: 72 U/L (ref 39–117)
ALP SERPL-CCNC: 82 U/L (ref 39–117)
ALT SERPL W P-5'-P-CCNC: 15 U/L (ref 1–33)
ALT SERPL W P-5'-P-CCNC: 15 U/L (ref 1–33)
ANION GAP SERPL CALCULATED.3IONS-SCNC: 10.7 MMOL/L (ref 5–15)
ANION GAP SERPL CALCULATED.3IONS-SCNC: 5.7 MMOL/L (ref 5–15)
ANION GAP SERPL CALCULATED.3IONS-SCNC: 6.6 MMOL/L (ref 5–15)
ANION GAP SERPL CALCULATED.3IONS-SCNC: 6.6 MMOL/L (ref 5–15)
ANION GAP SERPL CALCULATED.3IONS-SCNC: 9.7 MMOL/L (ref 5–15)
ANION GAP SERPL CALCULATED.3IONS-SCNC: 9.9 MMOL/L (ref 5–15)
AORTIC DIMENSIONLESS INDEX: 0.9 (DI)
ARTERIAL PATENCY WRIST A: POSITIVE
AST SERPL-CCNC: 11 U/L (ref 1–32)
AST SERPL-CCNC: 17 U/L (ref 1–32)
ATMOSPHERIC PRESS: 761.4 MMHG
B PARAPERT DNA SPEC QL NAA+PROBE: NOT DETECTED
B PERT DNA SPEC QL NAA+PROBE: NOT DETECTED
BACTERIA BLD CULT: ABNORMAL
BACTERIA SPEC AEROBE CULT: ABNORMAL
BACTERIA SPEC AEROBE CULT: NORMAL
BACTERIA SPEC RESP CULT: NORMAL
BACTERIA UR QL AUTO: ABNORMAL /HPF
BASE EXCESS BLDA CALC-SCNC: 1.7 MMOL/L (ref 0–2)
BASOPHILS # BLD AUTO: 0.03 10*3/MM3 (ref 0–0.2)
BASOPHILS # BLD AUTO: 0.04 10*3/MM3 (ref 0–0.2)
BASOPHILS # BLD AUTO: 0.06 10*3/MM3 (ref 0–0.2)
BASOPHILS NFR BLD AUTO: 0.2 % (ref 0–1.5)
BASOPHILS NFR BLD AUTO: 0.3 % (ref 0–1.5)
BASOPHILS NFR BLD AUTO: 0.3 % (ref 0–1.5)
BASOPHILS NFR BLD AUTO: 0.5 % (ref 0–1.5)
BASOPHILS NFR BLD AUTO: 0.6 % (ref 0–1.5)
BDY SITE: ABNORMAL
BH CV ECHO MEAS - ACS: 1.62 CM
BH CV ECHO MEAS - AO MAX PG: 9.1 MMHG
BH CV ECHO MEAS - AO MEAN PG: 5.4 MMHG
BH CV ECHO MEAS - AO V2 MAX: 150.7 CM/SEC
BH CV ECHO MEAS - AO V2 VTI: 21.3 CM
BH CV ECHO MEAS - AVA(I,D): 3.2 CM2
BH CV ECHO MEAS - EDV(CUBED): 70.8 ML
BH CV ECHO MEAS - EDV(MOD-SP2): 92 ML
BH CV ECHO MEAS - EDV(MOD-SP4): 101 ML
BH CV ECHO MEAS - EF(MOD-BP): 69.6 %
BH CV ECHO MEAS - EF(MOD-SP2): 68.5 %
BH CV ECHO MEAS - EF(MOD-SP4): 67.3 %
BH CV ECHO MEAS - ESV(CUBED): 22.2 ML
BH CV ECHO MEAS - ESV(MOD-SP2): 29 ML
BH CV ECHO MEAS - ESV(MOD-SP4): 33 ML
BH CV ECHO MEAS - FS: 32.1 %
BH CV ECHO MEAS - IVS/LVPW: 0.97 CM
BH CV ECHO MEAS - IVSD: 1.19 CM
BH CV ECHO MEAS - LAT PEAK E' VEL: 4.7 CM/SEC
BH CV ECHO MEAS - LV DIASTOLIC VOL/BSA (35-75): 40.9 CM2
BH CV ECHO MEAS - LV MASS(C)D: 175.9 GRAMS
BH CV ECHO MEAS - LV MAX PG: 6.2 MMHG
BH CV ECHO MEAS - LV MEAN PG: 3.1 MMHG
BH CV ECHO MEAS - LV SYSTOLIC VOL/BSA (12-30): 13.4 CM2
BH CV ECHO MEAS - LV V1 MAX: 124.2 CM/SEC
BH CV ECHO MEAS - LV V1 VTI: 18.7 CM
BH CV ECHO MEAS - LVIDD: 4.1 CM
BH CV ECHO MEAS - LVIDS: 2.8 CM
BH CV ECHO MEAS - LVOT AREA: 3.6 CM2
BH CV ECHO MEAS - LVOT DIAM: 2.14 CM
BH CV ECHO MEAS - LVPWD: 1.23 CM
BH CV ECHO MEAS - MED PEAK E' VEL: 3.4 CM/SEC
BH CV ECHO MEAS - MV A DUR: 0.13 SEC
BH CV ECHO MEAS - MV A MAX VEL: 149.9 CM/SEC
BH CV ECHO MEAS - MV DEC TIME: 0.26 MSEC
BH CV ECHO MEAS - MV E MAX VEL: 65.1 CM/SEC
BH CV ECHO MEAS - MV E/A: 0.43
BH CV ECHO MEAS - MV MAX PG: 12.3 MMHG
BH CV ECHO MEAS - MV MEAN PG: 4 MMHG
BH CV ECHO MEAS - MV V2 VTI: 35.1 CM
BH CV ECHO MEAS - MVA(VTI): 1.92 CM2
BH CV ECHO MEAS - PA ACC TIME: 0.1 SEC
BH CV ECHO MEAS - PA PR(ACCEL): 36.2 MMHG
BH CV ECHO MEAS - PA V2 MAX: 141.2 CM/SEC
BH CV ECHO MEAS - QP/QS: 0.67
BH CV ECHO MEAS - RV MAX PG: 3.4 MMHG
BH CV ECHO MEAS - RV V1 MAX: 91.9 CM/SEC
BH CV ECHO MEAS - RV V1 VTI: 11.9 CM
BH CV ECHO MEAS - RVOT DIAM: 2.19 CM
BH CV ECHO MEAS - SI(MOD-SP2): 25.5 ML/M2
BH CV ECHO MEAS - SI(MOD-SP4): 27.5 ML/M2
BH CV ECHO MEAS - SV(LVOT): 67.2 ML
BH CV ECHO MEAS - SV(MOD-SP2): 63 ML
BH CV ECHO MEAS - SV(MOD-SP4): 68 ML
BH CV ECHO MEAS - SV(RVOT): 44.9 ML
BH CV ECHO MEASUREMENTS AVERAGE E/E' RATIO: 16.07
BH CV XLRA - RV BASE: 2.9 CM
BH CV XLRA - RV LENGTH: 5.5 CM
BH CV XLRA - RV MID: 2.28 CM
BH CV XLRA - TDI S': 16.3 CM/SEC
BILIRUB SERPL-MCNC: 0.4 MG/DL (ref 0–1.2)
BILIRUB SERPL-MCNC: 0.6 MG/DL (ref 0–1.2)
BILIRUB UR QL STRIP: NEGATIVE
BOTTLE TYPE: ABNORMAL
BUN SERPL-MCNC: 14 MG/DL (ref 8–23)
BUN SERPL-MCNC: 15 MG/DL (ref 8–23)
BUN SERPL-MCNC: 18 MG/DL (ref 8–23)
BUN SERPL-MCNC: 19 MG/DL (ref 8–23)
BUN SERPL-MCNC: 19 MG/DL (ref 8–23)
BUN SERPL-MCNC: 21 MG/DL (ref 8–23)
BUN/CREAT SERPL: 15.6 (ref 7–25)
BUN/CREAT SERPL: 23.5 (ref 7–25)
BUN/CREAT SERPL: 25.4 (ref 7–25)
BUN/CREAT SERPL: 26.1 (ref 7–25)
BUN/CREAT SERPL: 28 (ref 7–25)
BUN/CREAT SERPL: 28.4 (ref 7–25)
C PNEUM DNA NPH QL NAA+NON-PROBE: NOT DETECTED
CALCIUM SPEC-SCNC: 10 MG/DL (ref 8.6–10.5)
CALCIUM SPEC-SCNC: 10 MG/DL (ref 8.6–10.5)
CALCIUM SPEC-SCNC: 10.1 MG/DL (ref 8.6–10.5)
CALCIUM SPEC-SCNC: 10.4 MG/DL (ref 8.6–10.5)
CALCIUM SPEC-SCNC: 9.7 MG/DL (ref 8.6–10.5)
CALCIUM SPEC-SCNC: 9.8 MG/DL (ref 8.6–10.5)
CHLORIDE SERPL-SCNC: 100 MMOL/L (ref 98–107)
CHLORIDE SERPL-SCNC: 100 MMOL/L (ref 98–107)
CHLORIDE SERPL-SCNC: 101 MMOL/L (ref 98–107)
CHLORIDE SERPL-SCNC: 103 MMOL/L (ref 98–107)
CHLORIDE SERPL-SCNC: 96 MMOL/L (ref 98–107)
CHLORIDE SERPL-SCNC: 97 MMOL/L (ref 98–107)
CLARITY UR: ABNORMAL
CO2 SERPL-SCNC: 25.4 MMOL/L (ref 22–29)
CO2 SERPL-SCNC: 27.1 MMOL/L (ref 22–29)
CO2 SERPL-SCNC: 27.3 MMOL/L (ref 22–29)
CO2 SERPL-SCNC: 27.3 MMOL/L (ref 22–29)
CO2 SERPL-SCNC: 29.3 MMOL/L (ref 22–29)
CO2 SERPL-SCNC: 30.4 MMOL/L (ref 22–29)
COLOR UR: YELLOW
CREAT SERPL-MCNC: 0.59 MG/DL (ref 0.57–1)
CREAT SERPL-MCNC: 0.67 MG/DL (ref 0.57–1)
CREAT SERPL-MCNC: 0.69 MG/DL (ref 0.57–1)
CREAT SERPL-MCNC: 0.75 MG/DL (ref 0.57–1)
CREAT SERPL-MCNC: 0.81 MG/DL (ref 0.57–1)
CREAT SERPL-MCNC: 0.9 MG/DL (ref 0.57–1)
D-LACTATE SERPL-SCNC: 1.3 MMOL/L (ref 0.5–2)
DEPRECATED RDW RBC AUTO: 40.8 FL (ref 37–54)
DEPRECATED RDW RBC AUTO: 41.9 FL (ref 37–54)
DEPRECATED RDW RBC AUTO: 43.4 FL (ref 37–54)
DEPRECATED RDW RBC AUTO: 44.2 FL (ref 37–54)
DEPRECATED RDW RBC AUTO: 44.3 FL (ref 37–54)
DEPRECATED RDW RBC AUTO: 45.6 FL (ref 37–54)
EGFRCR SERPLBLD CKD-EPI 2021: 62 ML/MIN/1.73
EGFRCR SERPLBLD CKD-EPI 2021: 69.9 ML/MIN/1.73
EGFRCR SERPLBLD CKD-EPI 2021: 76.7 ML/MIN/1.73
EGFRCR SERPLBLD CKD-EPI 2021: 84.1 ML/MIN/1.73
EGFRCR SERPLBLD CKD-EPI 2021: 84.7 ML/MIN/1.73
EGFRCR SERPLBLD CKD-EPI 2021: 87.4 ML/MIN/1.73
EOSINOPHIL # BLD AUTO: 0.02 10*3/MM3 (ref 0–0.4)
EOSINOPHIL # BLD AUTO: 0.29 10*3/MM3 (ref 0–0.4)
EOSINOPHIL # BLD AUTO: 0.37 10*3/MM3 (ref 0–0.4)
EOSINOPHIL # BLD AUTO: 0.37 10*3/MM3 (ref 0–0.4)
EOSINOPHIL # BLD AUTO: 0.46 10*3/MM3 (ref 0–0.4)
EOSINOPHIL NFR BLD AUTO: 0.1 % (ref 0.3–6.2)
EOSINOPHIL NFR BLD AUTO: 3.3 % (ref 0.3–6.2)
EOSINOPHIL NFR BLD AUTO: 4.2 % (ref 0.3–6.2)
EOSINOPHIL NFR BLD AUTO: 4.3 % (ref 0.3–6.2)
EOSINOPHIL NFR BLD AUTO: 4.7 % (ref 0.3–6.2)
ERYTHROCYTE [DISTWIDTH] IN BLOOD BY AUTOMATED COUNT: 12.9 % (ref 12.3–15.4)
ERYTHROCYTE [DISTWIDTH] IN BLOOD BY AUTOMATED COUNT: 13.3 % (ref 12.3–15.4)
ERYTHROCYTE [DISTWIDTH] IN BLOOD BY AUTOMATED COUNT: 13.3 % (ref 12.3–15.4)
ERYTHROCYTE [DISTWIDTH] IN BLOOD BY AUTOMATED COUNT: 13.4 % (ref 12.3–15.4)
ERYTHROCYTE [DISTWIDTH] IN BLOOD BY AUTOMATED COUNT: 13.5 % (ref 12.3–15.4)
ERYTHROCYTE [DISTWIDTH] IN BLOOD BY AUTOMATED COUNT: 13.6 % (ref 12.3–15.4)
FLUAV SUBTYP SPEC NAA+PROBE: NOT DETECTED
FLUBV RNA ISLT QL NAA+PROBE: NOT DETECTED
GAS FLOW AIRWAY: 15 LPM
GLOBULIN UR ELPH-MCNC: 2.9 GM/DL
GLOBULIN UR ELPH-MCNC: 3 GM/DL
GLUCOSE BLDC GLUCOMTR-MCNC: 186 MG/DL (ref 70–130)
GLUCOSE BLDC GLUCOMTR-MCNC: 195 MG/DL (ref 70–130)
GLUCOSE BLDC GLUCOMTR-MCNC: 206 MG/DL (ref 70–130)
GLUCOSE BLDC GLUCOMTR-MCNC: 213 MG/DL (ref 70–130)
GLUCOSE BLDC GLUCOMTR-MCNC: 217 MG/DL (ref 70–130)
GLUCOSE BLDC GLUCOMTR-MCNC: 220 MG/DL (ref 70–130)
GLUCOSE BLDC GLUCOMTR-MCNC: 223 MG/DL (ref 70–130)
GLUCOSE BLDC GLUCOMTR-MCNC: 224 MG/DL (ref 70–130)
GLUCOSE BLDC GLUCOMTR-MCNC: 230 MG/DL (ref 70–130)
GLUCOSE BLDC GLUCOMTR-MCNC: 231 MG/DL (ref 70–130)
GLUCOSE BLDC GLUCOMTR-MCNC: 234 MG/DL (ref 70–130)
GLUCOSE BLDC GLUCOMTR-MCNC: 240 MG/DL (ref 70–130)
GLUCOSE BLDC GLUCOMTR-MCNC: 247 MG/DL (ref 70–130)
GLUCOSE BLDC GLUCOMTR-MCNC: 252 MG/DL (ref 70–130)
GLUCOSE BLDC GLUCOMTR-MCNC: 254 MG/DL (ref 70–130)
GLUCOSE BLDC GLUCOMTR-MCNC: 257 MG/DL (ref 70–130)
GLUCOSE BLDC GLUCOMTR-MCNC: 258 MG/DL (ref 70–130)
GLUCOSE BLDC GLUCOMTR-MCNC: 264 MG/DL (ref 70–130)
GLUCOSE BLDC GLUCOMTR-MCNC: 273 MG/DL (ref 70–130)
GLUCOSE BLDC GLUCOMTR-MCNC: 293 MG/DL (ref 70–130)
GLUCOSE BLDC GLUCOMTR-MCNC: 309 MG/DL (ref 70–130)
GLUCOSE SERPL-MCNC: 157 MG/DL (ref 65–99)
GLUCOSE SERPL-MCNC: 200 MG/DL (ref 65–99)
GLUCOSE SERPL-MCNC: 212 MG/DL (ref 65–99)
GLUCOSE SERPL-MCNC: 219 MG/DL (ref 65–99)
GLUCOSE SERPL-MCNC: 224 MG/DL (ref 65–99)
GLUCOSE SERPL-MCNC: 248 MG/DL (ref 65–99)
GLUCOSE UR STRIP-MCNC: NEGATIVE MG/DL
GRAM STN SPEC: ABNORMAL
GRAM STN SPEC: NORMAL
HADV DNA SPEC NAA+PROBE: NOT DETECTED
HCO3 BLDA-SCNC: 26.7 MMOL/L (ref 22–28)
HCOV 229E RNA SPEC QL NAA+PROBE: NOT DETECTED
HCOV HKU1 RNA SPEC QL NAA+PROBE: NOT DETECTED
HCOV NL63 RNA SPEC QL NAA+PROBE: NOT DETECTED
HCOV OC43 RNA SPEC QL NAA+PROBE: NOT DETECTED
HCT VFR BLD AUTO: 35.4 % (ref 34–46.6)
HCT VFR BLD AUTO: 39.4 % (ref 34–46.6)
HCT VFR BLD AUTO: 39.7 % (ref 34–46.6)
HCT VFR BLD AUTO: 40.4 % (ref 34–46.6)
HCT VFR BLD AUTO: 40.7 % (ref 34–46.6)
HCT VFR BLD AUTO: 40.7 % (ref 34–46.6)
HGB BLD-MCNC: 11.4 G/DL (ref 12–15.9)
HGB BLD-MCNC: 12.3 G/DL (ref 12–15.9)
HGB BLD-MCNC: 12.5 G/DL (ref 12–15.9)
HGB BLD-MCNC: 12.8 G/DL (ref 12–15.9)
HGB BLD-MCNC: 12.9 G/DL (ref 12–15.9)
HGB BLD-MCNC: 13.5 G/DL (ref 12–15.9)
HGB UR QL STRIP.AUTO: ABNORMAL
HMPV RNA NPH QL NAA+NON-PROBE: NOT DETECTED
HOLD SPECIMEN: NORMAL
HPIV1 RNA ISLT QL NAA+PROBE: NOT DETECTED
HPIV2 RNA SPEC QL NAA+PROBE: NOT DETECTED
HPIV3 RNA NPH QL NAA+PROBE: NOT DETECTED
HPIV4 P GENE NPH QL NAA+PROBE: NOT DETECTED
HYALINE CASTS UR QL AUTO: ABNORMAL /LPF
IMM GRANULOCYTES # BLD AUTO: 0.05 10*3/MM3 (ref 0–0.05)
IMM GRANULOCYTES # BLD AUTO: 0.11 10*3/MM3 (ref 0–0.05)
IMM GRANULOCYTES NFR BLD AUTO: 0.4 % (ref 0–0.5)
IMM GRANULOCYTES NFR BLD AUTO: 0.6 % (ref 0–0.5)
IMM GRANULOCYTES NFR BLD AUTO: 1 % (ref 0–0.5)
ISOLATED FROM: ABNORMAL
KETONES UR QL STRIP: NEGATIVE
L PNEUMO1 AG UR QL IA: NEGATIVE
LEFT ATRIUM VOLUME INDEX: 21.4 ML/M2
LEUKOCYTE ESTERASE UR QL STRIP.AUTO: ABNORMAL
LYMPHOCYTES # BLD AUTO: 2.38 10*3/MM3 (ref 0.7–3.1)
LYMPHOCYTES # BLD AUTO: 2.6 10*3/MM3 (ref 0.7–3.1)
LYMPHOCYTES # BLD AUTO: 2.83 10*3/MM3 (ref 0.7–3.1)
LYMPHOCYTES # BLD AUTO: 2.98 10*3/MM3 (ref 0.7–3.1)
LYMPHOCYTES # BLD AUTO: 3.66 10*3/MM3 (ref 0.7–3.1)
LYMPHOCYTES NFR BLD AUTO: 22.3 % (ref 19.6–45.3)
LYMPHOCYTES NFR BLD AUTO: 27.5 % (ref 19.6–45.3)
LYMPHOCYTES NFR BLD AUTO: 32.1 % (ref 19.6–45.3)
LYMPHOCYTES NFR BLD AUTO: 33 % (ref 19.6–45.3)
LYMPHOCYTES NFR BLD AUTO: 34.6 % (ref 19.6–45.3)
M PNEUMO IGG SER IA-ACNC: NOT DETECTED
MAGNESIUM SERPL-MCNC: 1.7 MG/DL (ref 1.6–2.4)
MAXIMAL PREDICTED HEART RATE: 132 BPM
MCH RBC QN AUTO: 27.7 PG (ref 26.6–33)
MCH RBC QN AUTO: 28 PG (ref 26.6–33)
MCH RBC QN AUTO: 28.1 PG (ref 26.6–33)
MCH RBC QN AUTO: 28.6 PG (ref 26.6–33)
MCH RBC QN AUTO: 28.7 PG (ref 26.6–33)
MCH RBC QN AUTO: 29 PG (ref 26.6–33)
MCHC RBC AUTO-ENTMCNC: 30.7 G/DL (ref 31.5–35.7)
MCHC RBC AUTO-ENTMCNC: 31.2 G/DL (ref 31.5–35.7)
MCHC RBC AUTO-ENTMCNC: 31.7 G/DL (ref 31.5–35.7)
MCHC RBC AUTO-ENTMCNC: 32.2 G/DL (ref 31.5–35.7)
MCHC RBC AUTO-ENTMCNC: 32.5 G/DL (ref 31.5–35.7)
MCHC RBC AUTO-ENTMCNC: 33.2 G/DL (ref 31.5–35.7)
MCV RBC AUTO: 85.9 FL (ref 79–97)
MCV RBC AUTO: 86.6 FL (ref 79–97)
MCV RBC AUTO: 89.2 FL (ref 79–97)
MCV RBC AUTO: 90 FL (ref 79–97)
MCV RBC AUTO: 90.2 FL (ref 79–97)
MCV RBC AUTO: 91.1 FL (ref 79–97)
MODALITY: ABNORMAL
MONOCYTES # BLD AUTO: 0.57 10*3/MM3 (ref 0.1–0.9)
MONOCYTES # BLD AUTO: 0.64 10*3/MM3 (ref 0.1–0.9)
MONOCYTES # BLD AUTO: 0.71 10*3/MM3 (ref 0.1–0.9)
MONOCYTES # BLD AUTO: 0.77 10*3/MM3 (ref 0.1–0.9)
MONOCYTES # BLD AUTO: 1.52 10*3/MM3 (ref 0.1–0.9)
MONOCYTES NFR BLD AUTO: 11.4 % (ref 5–12)
MONOCYTES NFR BLD AUTO: 6.6 % (ref 5–12)
MONOCYTES NFR BLD AUTO: 7.3 % (ref 5–12)
MONOCYTES NFR BLD AUTO: 7.3 % (ref 5–12)
MONOCYTES NFR BLD AUTO: 9 % (ref 5–12)
MRSA DNA SPEC QL NAA+PROBE: NORMAL
NEUTROPHILS NFR BLD AUTO: 4.11 10*3/MM3 (ref 1.7–7)
NEUTROPHILS NFR BLD AUTO: 4.89 10*3/MM3 (ref 1.7–7)
NEUTROPHILS NFR BLD AUTO: 5.35 10*3/MM3 (ref 1.7–7)
NEUTROPHILS NFR BLD AUTO: 5.53 10*3/MM3 (ref 1.7–7)
NEUTROPHILS NFR BLD AUTO: 52.2 % (ref 42.7–76)
NEUTROPHILS NFR BLD AUTO: 52.2 % (ref 42.7–76)
NEUTROPHILS NFR BLD AUTO: 55.5 % (ref 42.7–76)
NEUTROPHILS NFR BLD AUTO: 61.7 % (ref 42.7–76)
NEUTROPHILS NFR BLD AUTO: 65.6 % (ref 42.7–76)
NEUTROPHILS NFR BLD AUTO: 8.75 10*3/MM3 (ref 1.7–7)
NITRITE UR QL STRIP: NEGATIVE
NRBC BLD AUTO-RTO: 0 /100 WBC (ref 0–0.2)
NRBC BLD AUTO-RTO: 0.1 /100 WBC (ref 0–0.2)
NT-PROBNP SERPL-MCNC: 552 PG/ML (ref 0–1800)
PCO2 BLDA: 42.6 MM HG (ref 35–45)
PH BLDA: 7.41 PH UNITS (ref 7.35–7.45)
PH UR STRIP.AUTO: 7.5 [PH] (ref 5–8)
PHOSPHATE SERPL-MCNC: 2.6 MG/DL (ref 2.5–4.5)
PLATELET # BLD AUTO: 199 10*3/MM3 (ref 140–450)
PLATELET # BLD AUTO: 221 10*3/MM3 (ref 140–450)
PLATELET # BLD AUTO: 227 10*3/MM3 (ref 140–450)
PLATELET # BLD AUTO: 231 10*3/MM3 (ref 140–450)
PLATELET # BLD AUTO: 238 10*3/MM3 (ref 140–450)
PLATELET # BLD AUTO: 243 10*3/MM3 (ref 140–450)
PMV BLD AUTO: 10.1 FL (ref 6–12)
PMV BLD AUTO: 10.3 FL (ref 6–12)
PMV BLD AUTO: 10.3 FL (ref 6–12)
PMV BLD AUTO: 10.5 FL (ref 6–12)
PMV BLD AUTO: 10.8 FL (ref 6–12)
PMV BLD AUTO: 9.7 FL (ref 6–12)
PO2 BLDA: 53.6 MM HG (ref 80–100)
POTASSIUM SERPL-SCNC: 4.3 MMOL/L (ref 3.5–5.2)
POTASSIUM SERPL-SCNC: 4.4 MMOL/L (ref 3.5–5.2)
POTASSIUM SERPL-SCNC: 4.4 MMOL/L (ref 3.5–5.2)
POTASSIUM SERPL-SCNC: 4.6 MMOL/L (ref 3.5–5.2)
POTASSIUM SERPL-SCNC: 4.7 MMOL/L (ref 3.5–5.2)
POTASSIUM SERPL-SCNC: 4.8 MMOL/L (ref 3.5–5.2)
PROCALCITONIN SERPL-MCNC: 0.69 NG/ML (ref 0–0.25)
PROT SERPL-MCNC: 6.3 G/DL (ref 6–8.5)
PROT SERPL-MCNC: 6.3 G/DL (ref 6–8.5)
PROT UR QL STRIP: ABNORMAL
QT INTERVAL: 382 MS
RBC # BLD AUTO: 4.12 10*6/MM3 (ref 3.77–5.28)
RBC # BLD AUTO: 4.38 10*6/MM3 (ref 3.77–5.28)
RBC # BLD AUTO: 4.45 10*6/MM3 (ref 3.77–5.28)
RBC # BLD AUTO: 4.47 10*6/MM3 (ref 3.77–5.28)
RBC # BLD AUTO: 4.48 10*6/MM3 (ref 3.77–5.28)
RBC # BLD AUTO: 4.7 10*6/MM3 (ref 3.77–5.28)
RBC # UR STRIP: ABNORMAL /HPF
REF LAB TEST METHOD: ABNORMAL
RHINOVIRUS RNA SPEC NAA+PROBE: NOT DETECTED
RSV RNA NPH QL NAA+NON-PROBE: NOT DETECTED
S PNEUM AG SPEC QL LA: NEGATIVE
SAO2 % BLDCOA: 87.4 % (ref 92–99)
SARS-COV-2 RNA NPH QL NAA+NON-PROBE: NOT DETECTED
SINUS: 2.8 CM
SODIUM SERPL-SCNC: 133 MMOL/L (ref 136–145)
SODIUM SERPL-SCNC: 133 MMOL/L (ref 136–145)
SODIUM SERPL-SCNC: 135 MMOL/L (ref 136–145)
SODIUM SERPL-SCNC: 137 MMOL/L (ref 136–145)
SODIUM SERPL-SCNC: 137 MMOL/L (ref 136–145)
SODIUM SERPL-SCNC: 138 MMOL/L (ref 136–145)
SP GR UR STRIP: 1.01 (ref 1–1.03)
SQUAMOUS #/AREA URNS HPF: ABNORMAL /HPF
STJ: 2.37 CM
STRESS TARGET HR: 112 BPM
TOTAL RATE: 15 BREATHS/MINUTE
TROPONIN T SERPL-MCNC: <0.01 NG/ML (ref 0–0.03)
UROBILINOGEN UR QL STRIP: ABNORMAL
WBC # UR STRIP: ABNORMAL /HPF
WBC NRBC COR # BLD: 10.59 10*3/MM3 (ref 3.4–10.8)
WBC NRBC COR # BLD: 13.35 10*3/MM3 (ref 3.4–10.8)
WBC NRBC COR # BLD: 7.88 10*3/MM3 (ref 3.4–10.8)
WBC NRBC COR # BLD: 8.67 10*3/MM3 (ref 3.4–10.8)
WBC NRBC COR # BLD: 8.81 10*3/MM3 (ref 3.4–10.8)
WBC NRBC COR # BLD: 8.96 10*3/MM3 (ref 3.4–10.8)
WHOLE BLOOD HOLD COAG: NORMAL
WHOLE BLOOD HOLD COAG: NORMAL
WHOLE BLOOD HOLD SPECIMEN: NORMAL
WHOLE BLOOD HOLD SPECIMEN: NORMAL

## 2022-01-01 PROCEDURE — 94799 UNLISTED PULMONARY SVC/PX: CPT

## 2022-01-01 PROCEDURE — 87040 BLOOD CULTURE FOR BACTERIA: CPT | Performed by: HOSPITALIST

## 2022-01-01 PROCEDURE — 83880 ASSAY OF NATRIURETIC PEPTIDE: CPT | Performed by: PHYSICIAN ASSISTANT

## 2022-01-01 PROCEDURE — 84145 PROCALCITONIN (PCT): CPT | Performed by: HOSPITALIST

## 2022-01-01 PROCEDURE — 25010000002 LORAZEPAM PER 2 MG: Performed by: INTERNAL MEDICINE

## 2022-01-01 PROCEDURE — 25010000002 CEFEPIME PER 500 MG: Performed by: INTERNAL MEDICINE

## 2022-01-01 PROCEDURE — 94660 CPAP INITIATION&MGMT: CPT

## 2022-01-01 PROCEDURE — 80053 COMPREHEN METABOLIC PANEL: CPT | Performed by: NURSE PRACTITIONER

## 2022-01-01 PROCEDURE — 25010000002 CEFEPIME PER 500 MG: Performed by: NURSE PRACTITIONER

## 2022-01-01 PROCEDURE — 63710000001 INSULIN LISPRO (HUMAN) PER 5 UNITS: Performed by: INTERNAL MEDICINE

## 2022-01-01 PROCEDURE — 96365 THER/PROPH/DIAG IV INF INIT: CPT

## 2022-01-01 PROCEDURE — 80048 BASIC METABOLIC PNL TOTAL CA: CPT | Performed by: INTERNAL MEDICINE

## 2022-01-01 PROCEDURE — 94760 N-INVAS EAR/PLS OXIMETRY 1: CPT

## 2022-01-01 PROCEDURE — 87449 NOS EACH ORGANISM AG IA: CPT | Performed by: HOSPITALIST

## 2022-01-01 PROCEDURE — 85025 COMPLETE CBC W/AUTO DIFF WBC: CPT | Performed by: INTERNAL MEDICINE

## 2022-01-01 PROCEDURE — 87147 CULTURE TYPE IMMUNOLOGIC: CPT | Performed by: HOSPITALIST

## 2022-01-01 PROCEDURE — G0378 HOSPITAL OBSERVATION PER HR: HCPCS

## 2022-01-01 PROCEDURE — 25010000002 LORAZEPAM PER 2 MG: Performed by: NURSE PRACTITIONER

## 2022-01-01 PROCEDURE — 87641 MR-STAPH DNA AMP PROBE: CPT | Performed by: HOSPITALIST

## 2022-01-01 PROCEDURE — 94761 N-INVAS EAR/PLS OXIMETRY MLT: CPT

## 2022-01-01 PROCEDURE — 83735 ASSAY OF MAGNESIUM: CPT | Performed by: HOSPITALIST

## 2022-01-01 PROCEDURE — 36415 COLL VENOUS BLD VENIPUNCTURE: CPT

## 2022-01-01 PROCEDURE — 0202U NFCT DS 22 TRGT SARS-COV-2: CPT | Performed by: PHYSICIAN ASSISTANT

## 2022-01-01 PROCEDURE — 84484 ASSAY OF TROPONIN QUANT: CPT | Performed by: PHYSICIAN ASSISTANT

## 2022-01-01 PROCEDURE — 87205 SMEAR GRAM STAIN: CPT | Performed by: HOSPITALIST

## 2022-01-01 PROCEDURE — 84100 ASSAY OF PHOSPHORUS: CPT | Performed by: HOSPITALIST

## 2022-01-01 PROCEDURE — 0 IOPAMIDOL PER 1 ML: Performed by: EMERGENCY MEDICINE

## 2022-01-01 PROCEDURE — 82962 GLUCOSE BLOOD TEST: CPT

## 2022-01-01 PROCEDURE — 63710000001 INSULIN LISPRO (HUMAN) PER 5 UNITS: Performed by: HOSPITALIST

## 2022-01-01 PROCEDURE — 83605 ASSAY OF LACTIC ACID: CPT | Performed by: NURSE PRACTITIONER

## 2022-01-01 PROCEDURE — 82803 BLOOD GASES ANY COMBINATION: CPT

## 2022-01-01 PROCEDURE — 94640 AIRWAY INHALATION TREATMENT: CPT

## 2022-01-01 PROCEDURE — 87150 DNA/RNA AMPLIFIED PROBE: CPT | Performed by: HOSPITALIST

## 2022-01-01 PROCEDURE — P9612 CATHETERIZE FOR URINE SPEC: HCPCS

## 2022-01-01 PROCEDURE — 71275 CT ANGIOGRAPHY CHEST: CPT

## 2022-01-01 PROCEDURE — 81001 URINALYSIS AUTO W/SCOPE: CPT | Performed by: EMERGENCY MEDICINE

## 2022-01-01 PROCEDURE — 85025 COMPLETE CBC W/AUTO DIFF WBC: CPT | Performed by: PHYSICIAN ASSISTANT

## 2022-01-01 PROCEDURE — 25010000002 FUROSEMIDE PER 20 MG: Performed by: INTERNAL MEDICINE

## 2022-01-01 PROCEDURE — 25010000002 MORPHINE PER 10 MG: Performed by: NURSE PRACTITIONER

## 2022-01-01 PROCEDURE — 25010000002 FUROSEMIDE PER 20 MG: Performed by: PHYSICIAN ASSISTANT

## 2022-01-01 PROCEDURE — 25010000002 PERFLUTREN (DEFINITY) 8.476 MG IN SODIUM CHLORIDE (PF) 0.9 % 10 ML INJECTION: Performed by: HOSPITALIST

## 2022-01-01 PROCEDURE — 93306 TTE W/DOPPLER COMPLETE: CPT

## 2022-01-01 PROCEDURE — 71045 X-RAY EXAM CHEST 1 VIEW: CPT

## 2022-01-01 PROCEDURE — 87086 URINE CULTURE/COLONY COUNT: CPT | Performed by: EMERGENCY MEDICINE

## 2022-01-01 PROCEDURE — 93005 ELECTROCARDIOGRAM TRACING: CPT

## 2022-01-01 PROCEDURE — 85027 COMPLETE CBC AUTOMATED: CPT | Performed by: HOSPITALIST

## 2022-01-01 PROCEDURE — 36600 WITHDRAWAL OF ARTERIAL BLOOD: CPT

## 2022-01-01 PROCEDURE — 80048 BASIC METABOLIC PNL TOTAL CA: CPT | Performed by: HOSPITALIST

## 2022-01-01 PROCEDURE — 25010000002 VANCOMYCIN 10 G RECONSTITUTED SOLUTION: Performed by: NURSE PRACTITIONER

## 2022-01-01 PROCEDURE — 99285 EMERGENCY DEPT VISIT HI MDM: CPT

## 2022-01-01 PROCEDURE — 99284 EMERGENCY DEPT VISIT MOD MDM: CPT

## 2022-01-01 PROCEDURE — 93306 TTE W/DOPPLER COMPLETE: CPT | Performed by: INTERNAL MEDICINE

## 2022-01-01 PROCEDURE — 85025 COMPLETE CBC W/AUTO DIFF WBC: CPT | Performed by: NURSE PRACTITIONER

## 2022-01-01 PROCEDURE — 25010000002 FUROSEMIDE PER 20 MG: Performed by: NURSE PRACTITIONER

## 2022-01-01 PROCEDURE — 93010 ELECTROCARDIOGRAM REPORT: CPT | Performed by: INTERNAL MEDICINE

## 2022-01-01 PROCEDURE — 25010000002 CEFTRIAXONE PER 250 MG: Performed by: HOSPITALIST

## 2022-01-01 PROCEDURE — 87040 BLOOD CULTURE FOR BACTERIA: CPT | Performed by: NURSE PRACTITIONER

## 2022-01-01 PROCEDURE — 80053 COMPREHEN METABOLIC PANEL: CPT | Performed by: PHYSICIAN ASSISTANT

## 2022-01-01 PROCEDURE — 87070 CULTURE OTHR SPECIMN AEROBIC: CPT | Performed by: HOSPITALIST

## 2022-01-01 RX ORDER — SCOLOPAMINE TRANSDERMAL SYSTEM 1 MG/1
1 PATCH, EXTENDED RELEASE TRANSDERMAL
Status: DISCONTINUED | OUTPATIENT
Start: 2022-01-01 | End: 2022-01-01 | Stop reason: HOSPADM

## 2022-01-01 RX ORDER — METHYLPREDNISOLONE 4 MG/1
4 TABLET ORAL DAILY
COMMUNITY
End: 2022-01-01 | Stop reason: HOSPADM

## 2022-01-01 RX ORDER — LORAZEPAM 2 MG/ML
1 INJECTION INTRAMUSCULAR
Status: DISCONTINUED | OUTPATIENT
Start: 2022-01-01 | End: 2022-01-01 | Stop reason: HOSPADM

## 2022-01-01 RX ORDER — ACETAMINOPHEN 325 MG/1
650 TABLET ORAL EVERY 4 HOURS PRN
Status: DISCONTINUED | OUTPATIENT
Start: 2022-01-01 | End: 2022-01-01 | Stop reason: HOSPADM

## 2022-01-01 RX ORDER — DEXTROSE MONOHYDRATE 25 G/50ML
25 INJECTION, SOLUTION INTRAVENOUS
Status: DISCONTINUED | OUTPATIENT
Start: 2022-01-01 | End: 2022-01-01 | Stop reason: HOSPADM

## 2022-01-01 RX ORDER — LORAZEPAM 2 MG/ML
1 CONCENTRATE ORAL
Status: DISCONTINUED | OUTPATIENT
Start: 2022-01-01 | End: 2022-01-01 | Stop reason: HOSPADM

## 2022-01-01 RX ORDER — ROSUVASTATIN CALCIUM 20 MG/1
20 TABLET, COATED ORAL NIGHTLY
Status: DISCONTINUED | OUTPATIENT
Start: 2022-01-01 | End: 2022-01-01 | Stop reason: HOSPADM

## 2022-01-01 RX ORDER — LORAZEPAM 2 MG/ML
2 INJECTION INTRAMUSCULAR
Status: DISCONTINUED | OUTPATIENT
Start: 2022-01-01 | End: 2022-01-01 | Stop reason: HOSPADM

## 2022-01-01 RX ORDER — IPRATROPIUM BROMIDE AND ALBUTEROL SULFATE 2.5; .5 MG/3ML; MG/3ML
3 SOLUTION RESPIRATORY (INHALATION)
Status: DISCONTINUED | OUTPATIENT
Start: 2022-01-01 | End: 2022-01-01 | Stop reason: HOSPADM

## 2022-01-01 RX ORDER — LORAZEPAM 2 MG/ML
0.5 INJECTION INTRAMUSCULAR
Status: DISCONTINUED | OUTPATIENT
Start: 2022-01-01 | End: 2022-01-01 | Stop reason: HOSPADM

## 2022-01-01 RX ORDER — SODIUM CHLORIDE 9 MG/ML
40 INJECTION, SOLUTION INTRAVENOUS AS NEEDED
Status: DISCONTINUED | OUTPATIENT
Start: 2022-01-01 | End: 2022-01-01 | Stop reason: HOSPADM

## 2022-01-01 RX ORDER — MELATONIN
2000 DAILY
Status: DISCONTINUED | OUTPATIENT
Start: 2022-01-01 | End: 2022-01-01 | Stop reason: HOSPADM

## 2022-01-01 RX ORDER — HYDROMORPHONE HYDROCHLORIDE 1 MG/ML
0.5 INJECTION, SOLUTION INTRAMUSCULAR; INTRAVENOUS; SUBCUTANEOUS
Status: DISCONTINUED | OUTPATIENT
Start: 2022-01-01 | End: 2022-01-01 | Stop reason: HOSPADM

## 2022-01-01 RX ORDER — POLYETHYLENE GLYCOL 3350 17 G/17G
17 POWDER, FOR SOLUTION ORAL DAILY
Status: DISCONTINUED | OUTPATIENT
Start: 2022-01-01 | End: 2022-01-01 | Stop reason: HOSPADM

## 2022-01-01 RX ORDER — ACETAMINOPHEN 650 MG/1
650 SUPPOSITORY RECTAL EVERY 4 HOURS PRN
Status: DISCONTINUED | OUTPATIENT
Start: 2022-01-01 | End: 2022-01-01 | Stop reason: HOSPADM

## 2022-01-01 RX ORDER — BENZONATATE 100 MG/1
100 CAPSULE ORAL 3 TIMES DAILY PRN
Status: DISCONTINUED | OUTPATIENT
Start: 2022-01-01 | End: 2022-01-01 | Stop reason: HOSPADM

## 2022-01-01 RX ORDER — CEFDINIR 300 MG/1
300 CAPSULE ORAL 2 TIMES DAILY
Qty: 8 CAPSULE | Refills: 0 | Status: SHIPPED | OUTPATIENT
Start: 2022-01-01 | End: 2022-01-01

## 2022-01-01 RX ORDER — ACETAMINOPHEN 160 MG/5ML
650 SOLUTION ORAL EVERY 4 HOURS PRN
Status: DISCONTINUED | OUTPATIENT
Start: 2022-01-01 | End: 2022-01-01 | Stop reason: HOSPADM

## 2022-01-01 RX ORDER — GLYCOPYRROLATE 0.2 MG/ML
0.4 INJECTION INTRAMUSCULAR; INTRAVENOUS
Status: DISCONTINUED | OUTPATIENT
Start: 2022-01-01 | End: 2022-01-01 | Stop reason: HOSPADM

## 2022-01-01 RX ORDER — SODIUM CHLORIDE 0.9 % (FLUSH) 0.9 %
10 SYRINGE (ML) INJECTION AS NEEDED
Status: DISCONTINUED | OUTPATIENT
Start: 2022-01-01 | End: 2022-01-01 | Stop reason: HOSPADM

## 2022-01-01 RX ORDER — CLONIDINE HYDROCHLORIDE 0.1 MG/1
0.1 TABLET ORAL 2 TIMES DAILY
Status: DISCONTINUED | OUTPATIENT
Start: 2022-01-01 | End: 2022-01-01 | Stop reason: HOSPADM

## 2022-01-01 RX ORDER — LORAZEPAM 2 MG/ML
0.5 CONCENTRATE ORAL
Status: DISCONTINUED | OUTPATIENT
Start: 2022-01-01 | End: 2022-01-01 | Stop reason: HOSPADM

## 2022-01-01 RX ORDER — INSULIN GLARGINE 100 [IU]/ML
38 INJECTION, SOLUTION SUBCUTANEOUS 2 TIMES DAILY
COMMUNITY

## 2022-01-01 RX ORDER — NICOTINE POLACRILEX 4 MG
15 LOZENGE BUCCAL
Status: DISCONTINUED | OUTPATIENT
Start: 2022-01-01 | End: 2022-01-01 | Stop reason: HOSPADM

## 2022-01-01 RX ORDER — FUROSEMIDE 10 MG/ML
40 INJECTION INTRAMUSCULAR; INTRAVENOUS DAILY
Status: DISCONTINUED | OUTPATIENT
Start: 2022-01-01 | End: 2022-01-01

## 2022-01-01 RX ORDER — KETOROLAC TROMETHAMINE 15 MG/ML
15 INJECTION, SOLUTION INTRAMUSCULAR; INTRAVENOUS EVERY 6 HOURS PRN
Status: DISCONTINUED | OUTPATIENT
Start: 2022-01-01 | End: 2022-01-01 | Stop reason: HOSPADM

## 2022-01-01 RX ORDER — DEXTROSE MONOHYDRATE 25 G/50ML
25 INJECTION, SOLUTION INTRAVENOUS
Status: DISCONTINUED | OUTPATIENT
Start: 2022-01-01 | End: 2022-01-01

## 2022-01-01 RX ORDER — ACETAMINOPHEN 325 MG/1
650 TABLET ORAL EVERY 8 HOURS PRN
Status: DISCONTINUED | OUTPATIENT
Start: 2022-01-01 | End: 2022-01-01 | Stop reason: HOSPADM

## 2022-01-01 RX ORDER — TRAMADOL HYDROCHLORIDE 50 MG/1
50 TABLET ORAL 3 TIMES DAILY
Status: DISCONTINUED | OUTPATIENT
Start: 2022-01-01 | End: 2022-01-01 | Stop reason: HOSPADM

## 2022-01-01 RX ORDER — LORAZEPAM 2 MG/ML
2 CONCENTRATE ORAL
Status: DISCONTINUED | OUTPATIENT
Start: 2022-01-01 | End: 2022-01-01 | Stop reason: HOSPADM

## 2022-01-01 RX ORDER — SODIUM CHLORIDE 0.9 % (FLUSH) 0.9 %
10 SYRINGE (ML) INJECTION EVERY 12 HOURS SCHEDULED
Status: DISCONTINUED | OUTPATIENT
Start: 2022-01-01 | End: 2022-01-01 | Stop reason: HOSPADM

## 2022-01-01 RX ORDER — GUAIFENESIN 600 MG/1
1200 TABLET, EXTENDED RELEASE ORAL EVERY 12 HOURS SCHEDULED
Status: DISCONTINUED | OUTPATIENT
Start: 2022-01-01 | End: 2022-01-01 | Stop reason: HOSPADM

## 2022-01-01 RX ORDER — FUROSEMIDE 20 MG/1
20 TABLET ORAL DAILY
Status: DISCONTINUED | OUTPATIENT
Start: 2022-01-01 | End: 2022-01-01 | Stop reason: HOSPADM

## 2022-01-01 RX ORDER — DIPHENOXYLATE HYDROCHLORIDE AND ATROPINE SULFATE 2.5; .025 MG/1; MG/1
1 TABLET ORAL
Status: DISCONTINUED | OUTPATIENT
Start: 2022-01-01 | End: 2022-01-01 | Stop reason: HOSPADM

## 2022-01-01 RX ORDER — ONDANSETRON 2 MG/ML
4 INJECTION INTRAMUSCULAR; INTRAVENOUS EVERY 6 HOURS PRN
Status: DISCONTINUED | OUTPATIENT
Start: 2022-01-01 | End: 2022-01-01 | Stop reason: HOSPADM

## 2022-01-01 RX ORDER — MORPHINE SULFATE 20 MG/ML
5 SOLUTION ORAL
Status: DISCONTINUED | OUTPATIENT
Start: 2022-01-01 | End: 2022-01-01 | Stop reason: HOSPADM

## 2022-01-01 RX ORDER — LINEZOLID 600 MG/1
600 TABLET, FILM COATED ORAL 2 TIMES DAILY
COMMUNITY
Start: 2022-01-01 | End: 2022-01-01 | Stop reason: HOSPADM

## 2022-01-01 RX ORDER — FUROSEMIDE 10 MG/ML
80 INJECTION INTRAMUSCULAR; INTRAVENOUS ONCE
Status: COMPLETED | OUTPATIENT
Start: 2022-01-01 | End: 2022-01-01

## 2022-01-01 RX ORDER — NITROGLYCERIN 0.4 MG/1
0.4 TABLET SUBLINGUAL
Status: DISCONTINUED | OUTPATIENT
Start: 2022-01-01 | End: 2022-01-01 | Stop reason: HOSPADM

## 2022-01-01 RX ORDER — DULOXETIN HYDROCHLORIDE 60 MG/1
60 CAPSULE, DELAYED RELEASE ORAL EVERY 12 HOURS SCHEDULED
Status: DISCONTINUED | OUTPATIENT
Start: 2022-01-01 | End: 2022-01-01 | Stop reason: HOSPADM

## 2022-01-01 RX ORDER — CHOLECALCIFEROL (VITAMIN D3) 125 MCG
1000 CAPSULE ORAL DAILY
Status: DISCONTINUED | OUTPATIENT
Start: 2022-01-01 | End: 2022-01-01 | Stop reason: HOSPADM

## 2022-01-01 RX ORDER — AMLODIPINE BESYLATE 10 MG/1
10 TABLET ORAL DAILY
Status: DISCONTINUED | OUTPATIENT
Start: 2022-01-01 | End: 2022-01-01 | Stop reason: HOSPADM

## 2022-01-01 RX ORDER — LEVOTHYROXINE SODIUM 0.12 MG/1
125 TABLET ORAL DAILY
Status: DISCONTINUED | OUTPATIENT
Start: 2022-01-01 | End: 2022-01-01 | Stop reason: HOSPADM

## 2022-01-01 RX ORDER — MORPHINE SULFATE 2 MG/ML
2 INJECTION, SOLUTION INTRAMUSCULAR; INTRAVENOUS
Status: DISCONTINUED | OUTPATIENT
Start: 2022-01-01 | End: 2022-01-01 | Stop reason: HOSPADM

## 2022-01-01 RX ORDER — GLYCOPYRROLATE 0.2 MG/ML
0.2 INJECTION INTRAMUSCULAR; INTRAVENOUS
Status: DISCONTINUED | OUTPATIENT
Start: 2022-01-01 | End: 2022-01-01 | Stop reason: HOSPADM

## 2022-01-01 RX ORDER — FUROSEMIDE 10 MG/ML
40 INJECTION INTRAMUSCULAR; INTRAVENOUS ONCE
Status: COMPLETED | OUTPATIENT
Start: 2022-01-01 | End: 2022-01-01

## 2022-01-01 RX ORDER — DULAGLUTIDE 0.75 MG/.5ML
0.75 INJECTION, SOLUTION SUBCUTANEOUS WEEKLY
COMMUNITY

## 2022-01-01 RX ORDER — INSULIN LISPRO 100 [IU]/ML
0-9 INJECTION, SOLUTION INTRAVENOUS; SUBCUTANEOUS
Status: DISCONTINUED | OUTPATIENT
Start: 2022-01-01 | End: 2022-01-01 | Stop reason: HOSPADM

## 2022-01-01 RX ORDER — TRAMADOL HYDROCHLORIDE 50 MG/1
50 TABLET ORAL 3 TIMES DAILY
COMMUNITY

## 2022-01-01 RX ORDER — ARIPIPRAZOLE 5 MG/1
5 TABLET ORAL DAILY
COMMUNITY

## 2022-01-01 RX ORDER — ARIPIPRAZOLE 2 MG/1
2 TABLET ORAL DAILY
Status: DISCONTINUED | OUTPATIENT
Start: 2022-01-01 | End: 2022-01-01 | Stop reason: HOSPADM

## 2022-01-01 RX ORDER — LEVOTHYROXINE SODIUM 0.15 MG/1
150 TABLET ORAL DAILY
Status: DISCONTINUED | OUTPATIENT
Start: 2022-01-01 | End: 2022-01-01 | Stop reason: HOSPADM

## 2022-01-01 RX ORDER — INSULIN LISPRO 100 [IU]/ML
0-14 INJECTION, SOLUTION INTRAVENOUS; SUBCUTANEOUS
Status: DISCONTINUED | OUTPATIENT
Start: 2022-01-01 | End: 2022-01-01

## 2022-01-01 RX ORDER — NICOTINE POLACRILEX 4 MG
15 LOZENGE BUCCAL
Status: DISCONTINUED | OUTPATIENT
Start: 2022-01-01 | End: 2022-01-01

## 2022-01-01 RX ORDER — BISACODYL 10 MG
10 SUPPOSITORY, RECTAL RECTAL DAILY PRN
Status: DISCONTINUED | OUTPATIENT
Start: 2022-01-01 | End: 2022-01-01 | Stop reason: HOSPADM

## 2022-01-01 RX ORDER — ARIPIPRAZOLE 5 MG/1
5 TABLET ORAL DAILY
Status: DISCONTINUED | OUTPATIENT
Start: 2022-01-01 | End: 2022-01-01 | Stop reason: HOSPADM

## 2022-01-01 RX ORDER — TRAMADOL HYDROCHLORIDE 50 MG/1
50 TABLET ORAL 3 TIMES DAILY
Qty: 9 TABLET | Refills: 0 | Status: SHIPPED | OUTPATIENT
Start: 2022-01-01 | End: 2022-01-01

## 2022-01-01 RX ORDER — ONDANSETRON 4 MG/1
4 TABLET, FILM COATED ORAL EVERY 6 HOURS PRN
Status: DISCONTINUED | OUTPATIENT
Start: 2022-01-01 | End: 2022-01-01 | Stop reason: HOSPADM

## 2022-01-01 RX ADMIN — INSULIN LISPRO 8 UNITS: 100 INJECTION, SOLUTION INTRAVENOUS; SUBCUTANEOUS at 16:39

## 2022-01-01 RX ADMIN — Medication 10 ML: at 22:51

## 2022-01-01 RX ADMIN — INSULIN LISPRO 4 UNITS: 100 INJECTION, SOLUTION INTRAVENOUS; SUBCUTANEOUS at 09:35

## 2022-01-01 RX ADMIN — POLYETHYLENE GLYCOL 3350 17 G: 17 POWDER, FOR SOLUTION ORAL at 15:58

## 2022-01-01 RX ADMIN — TRAMADOL HYDROCHLORIDE 50 MG: 50 TABLET, COATED ORAL at 21:58

## 2022-01-01 RX ADMIN — AMLODIPINE BESYLATE 10 MG: 10 TABLET ORAL at 15:58

## 2022-01-01 RX ADMIN — ACETAMINOPHEN 650 MG: 325 TABLET, FILM COATED ORAL at 06:15

## 2022-01-01 RX ADMIN — AMLODIPINE BESYLATE 10 MG: 10 TABLET ORAL at 08:04

## 2022-01-01 RX ADMIN — Medication 1000 MCG: at 08:40

## 2022-01-01 RX ADMIN — APIXABAN 5 MG: 5 TABLET, FILM COATED ORAL at 21:58

## 2022-01-01 RX ADMIN — TRAMADOL HYDROCHLORIDE 50 MG: 50 TABLET, COATED ORAL at 17:17

## 2022-01-01 RX ADMIN — DULOXETINE HYDROCHLORIDE 60 MG: 60 CAPSULE, DELAYED RELEASE ORAL at 21:58

## 2022-01-01 RX ADMIN — ACETAMINOPHEN 650 MG: 325 TABLET, FILM COATED ORAL at 21:17

## 2022-01-01 RX ADMIN — Medication 10 ML: at 09:28

## 2022-01-01 RX ADMIN — CEFTRIAXONE SODIUM 1 G: 1 INJECTION, POWDER, FOR SOLUTION INTRAMUSCULAR; INTRAVENOUS at 23:38

## 2022-01-01 RX ADMIN — FUROSEMIDE 20 MG: 20 TABLET ORAL at 09:35

## 2022-01-01 RX ADMIN — INSULIN LISPRO 6 UNITS: 100 INJECTION, SOLUTION INTRAVENOUS; SUBCUTANEOUS at 12:53

## 2022-01-01 RX ADMIN — APIXABAN 5 MG: 5 TABLET, FILM COATED ORAL at 22:04

## 2022-01-01 RX ADMIN — Medication 2000 UNITS: at 09:28

## 2022-01-01 RX ADMIN — CEFEPIME 2 G: 2 INJECTION, POWDER, FOR SOLUTION INTRAVENOUS at 06:40

## 2022-01-01 RX ADMIN — APIXABAN 5 MG: 5 TABLET, FILM COATED ORAL at 21:45

## 2022-01-01 RX ADMIN — INSULIN GLARGINE-YFGN 40 UNITS: 100 INJECTION, SOLUTION SUBCUTANEOUS at 22:12

## 2022-01-01 RX ADMIN — AMLODIPINE BESYLATE 10 MG: 10 TABLET ORAL at 09:26

## 2022-01-01 RX ADMIN — FUROSEMIDE 20 MG: 20 TABLET ORAL at 08:40

## 2022-01-01 RX ADMIN — INSULIN LISPRO 4 UNITS: 100 INJECTION, SOLUTION INTRAVENOUS; SUBCUTANEOUS at 19:05

## 2022-01-01 RX ADMIN — TRAMADOL HYDROCHLORIDE 50 MG: 50 TABLET ORAL at 08:04

## 2022-01-01 RX ADMIN — CLONIDINE HYDROCHLORIDE 0.1 MG: 0.1 TABLET ORAL at 08:41

## 2022-01-01 RX ADMIN — ROSUVASTATIN CALCIUM 20 MG: 20 TABLET, FILM COATED ORAL at 21:08

## 2022-01-01 RX ADMIN — INSULIN GLARGINE-YFGN 20 UNITS: 100 INJECTION, SOLUTION SUBCUTANEOUS at 08:04

## 2022-01-01 RX ADMIN — APIXABAN 5 MG: 5 TABLET, FILM COATED ORAL at 09:35

## 2022-01-01 RX ADMIN — APIXABAN 5 MG: 5 TABLET, FILM COATED ORAL at 22:48

## 2022-01-01 RX ADMIN — Medication 2000 UNITS: at 08:41

## 2022-01-01 RX ADMIN — INSULIN GLARGINE-YFGN 20 UNITS: 100 INJECTION, SOLUTION SUBCUTANEOUS at 22:51

## 2022-01-01 RX ADMIN — TRAMADOL HYDROCHLORIDE 50 MG: 50 TABLET, COATED ORAL at 19:06

## 2022-01-01 RX ADMIN — TRAMADOL HYDROCHLORIDE 50 MG: 50 TABLET ORAL at 22:48

## 2022-01-01 RX ADMIN — LINAGLIPTIN 5 MG: 5 TABLET, FILM COATED ORAL at 08:41

## 2022-01-01 RX ADMIN — ROSUVASTATIN CALCIUM 20 MG: 20 TABLET, FILM COATED ORAL at 22:04

## 2022-01-01 RX ADMIN — Medication 10 ML: at 21:48

## 2022-01-01 RX ADMIN — FUROSEMIDE 80 MG: 10 INJECTION, SOLUTION INTRAMUSCULAR; INTRAVENOUS at 10:21

## 2022-01-01 RX ADMIN — APIXABAN 5 MG: 5 TABLET, FILM COATED ORAL at 09:27

## 2022-01-01 RX ADMIN — CEFEPIME 2 G: 2 INJECTION, POWDER, FOR SOLUTION INTRAVENOUS at 17:08

## 2022-01-01 RX ADMIN — TRAMADOL HYDROCHLORIDE 50 MG: 50 TABLET, COATED ORAL at 21:17

## 2022-01-01 RX ADMIN — DULOXETINE HYDROCHLORIDE 60 MG: 60 CAPSULE, DELAYED RELEASE ORAL at 22:03

## 2022-01-01 RX ADMIN — ROSUVASTATIN CALCIUM 20 MG: 20 TABLET, FILM COATED ORAL at 21:58

## 2022-01-01 RX ADMIN — AMLODIPINE BESYLATE 10 MG: 10 TABLET ORAL at 08:41

## 2022-01-01 RX ADMIN — INSULIN LISPRO 4 UNITS: 100 INJECTION, SOLUTION INTRAVENOUS; SUBCUTANEOUS at 17:18

## 2022-01-01 RX ADMIN — APIXABAN 5 MG: 5 TABLET, FILM COATED ORAL at 08:37

## 2022-01-01 RX ADMIN — ARIPIPRAZOLE 2 MG: 2 TABLET ORAL at 09:29

## 2022-01-01 RX ADMIN — INSULIN LISPRO 4 UNITS: 100 INJECTION, SOLUTION INTRAVENOUS; SUBCUTANEOUS at 11:40

## 2022-01-01 RX ADMIN — TRAMADOL HYDROCHLORIDE 50 MG: 50 TABLET ORAL at 21:45

## 2022-01-01 RX ADMIN — ARIPIPRAZOLE 2 MG: 2 TABLET ORAL at 08:38

## 2022-01-01 RX ADMIN — LORAZEPAM 2 MG: 2 INJECTION INTRAMUSCULAR; INTRAVENOUS at 03:53

## 2022-01-01 RX ADMIN — DULOXETINE HYDROCHLORIDE 60 MG: 60 CAPSULE, DELAYED RELEASE ORAL at 21:45

## 2022-01-01 RX ADMIN — Medication 2000 UNITS: at 08:39

## 2022-01-01 RX ADMIN — IPRATROPIUM BROMIDE AND ALBUTEROL SULFATE 3 ML: 2.5; .5 SOLUTION RESPIRATORY (INHALATION) at 22:06

## 2022-01-01 RX ADMIN — MORPHINE SULFATE 2 MG: 2 INJECTION, SOLUTION INTRAMUSCULAR; INTRAVENOUS at 02:47

## 2022-01-01 RX ADMIN — APIXABAN 5 MG: 5 TABLET, FILM COATED ORAL at 21:16

## 2022-01-01 RX ADMIN — GLYCOPYRROLATE 0.2 MG: 0.2 INJECTION INTRAMUSCULAR; INTRAVENOUS at 13:45

## 2022-01-01 RX ADMIN — Medication 1000 MCG: at 08:41

## 2022-01-01 RX ADMIN — MORPHINE SULFATE 2 MG: 2 INJECTION, SOLUTION INTRAMUSCULAR; INTRAVENOUS at 13:45

## 2022-01-01 RX ADMIN — PERFLUTREN 3 ML: 6.52 INJECTION, SUSPENSION INTRAVENOUS at 14:20

## 2022-01-01 RX ADMIN — ROSUVASTATIN CALCIUM 20 MG: 20 TABLET, FILM COATED ORAL at 22:48

## 2022-01-01 RX ADMIN — GUAIFENESIN 1200 MG: 600 TABLET, EXTENDED RELEASE ORAL at 22:48

## 2022-01-01 RX ADMIN — INSULIN LISPRO 8 UNITS: 100 INJECTION, SOLUTION INTRAVENOUS; SUBCUTANEOUS at 12:21

## 2022-01-01 RX ADMIN — APIXABAN 5 MG: 5 TABLET, FILM COATED ORAL at 08:04

## 2022-01-01 RX ADMIN — POLYETHYLENE GLYCOL 3350 17 G: 17 POWDER, FOR SOLUTION ORAL at 09:34

## 2022-01-01 RX ADMIN — LEVOTHYROXINE SODIUM 125 MCG: 0.12 TABLET ORAL at 09:35

## 2022-01-01 RX ADMIN — CLONIDINE HYDROCHLORIDE 0.1 MG: 0.1 TABLET ORAL at 08:39

## 2022-01-01 RX ADMIN — CEFEPIME 2 G: 2 INJECTION, POWDER, FOR SOLUTION INTRAVENOUS at 14:53

## 2022-01-01 RX ADMIN — CLONIDINE HYDROCHLORIDE 0.1 MG: 0.1 TABLET ORAL at 09:29

## 2022-01-01 RX ADMIN — DICLOFENAC SODIUM 4 G: 10 GEL TOPICAL at 21:48

## 2022-01-01 RX ADMIN — DICLOFENAC SODIUM 4 G: 10 GEL TOPICAL at 12:22

## 2022-01-01 RX ADMIN — TRAMADOL HYDROCHLORIDE 50 MG: 50 TABLET ORAL at 16:39

## 2022-01-01 RX ADMIN — INSULIN LISPRO 6 UNITS: 100 INJECTION, SOLUTION INTRAVENOUS; SUBCUTANEOUS at 06:00

## 2022-01-01 RX ADMIN — DULOXETINE HYDROCHLORIDE 60 MG: 60 CAPSULE, DELAYED RELEASE ORAL at 08:41

## 2022-01-01 RX ADMIN — ARIPIPRAZOLE 2 MG: 2 TABLET ORAL at 12:03

## 2022-01-01 RX ADMIN — GLYCOPYRROLATE 0.4 MG: 0.2 INJECTION INTRAMUSCULAR; INTRAVENOUS at 02:57

## 2022-01-01 RX ADMIN — INSULIN LISPRO 4 UNITS: 100 INJECTION, SOLUTION INTRAVENOUS; SUBCUTANEOUS at 17:08

## 2022-01-01 RX ADMIN — GLYCOPYRROLATE 0.2 MG: 0.2 INJECTION INTRAMUSCULAR; INTRAVENOUS at 09:26

## 2022-01-01 RX ADMIN — INSULIN LISPRO 4 UNITS: 100 INJECTION, SOLUTION INTRAVENOUS; SUBCUTANEOUS at 06:14

## 2022-01-01 RX ADMIN — LEVOTHYROXINE SODIUM 150 MCG: 0.15 TABLET ORAL at 08:04

## 2022-01-01 RX ADMIN — APIXABAN 5 MG: 5 TABLET, FILM COATED ORAL at 21:08

## 2022-01-01 RX ADMIN — INSULIN GLARGINE-YFGN 40 UNITS: 100 INJECTION, SOLUTION SUBCUTANEOUS at 21:59

## 2022-01-01 RX ADMIN — AMLODIPINE BESYLATE 10 MG: 10 TABLET ORAL at 21:16

## 2022-01-01 RX ADMIN — CLONIDINE HYDROCHLORIDE 0.1 MG: 0.1 TABLET ORAL at 21:08

## 2022-01-01 RX ADMIN — LEVOTHYROXINE SODIUM 125 MCG: 0.12 TABLET ORAL at 06:14

## 2022-01-01 RX ADMIN — INSULIN LISPRO 4 UNITS: 100 INJECTION, SOLUTION INTRAVENOUS; SUBCUTANEOUS at 12:03

## 2022-01-01 RX ADMIN — IOPAMIDOL 95 ML: 755 INJECTION, SOLUTION INTRAVENOUS at 12:36

## 2022-01-01 RX ADMIN — ROSUVASTATIN CALCIUM 20 MG: 20 TABLET, FILM COATED ORAL at 21:45

## 2022-01-01 RX ADMIN — INSULIN LISPRO 5 UNITS: 100 INJECTION, SOLUTION INTRAVENOUS; SUBCUTANEOUS at 17:01

## 2022-01-01 RX ADMIN — Medication 1000 MCG: at 09:34

## 2022-01-01 RX ADMIN — TRAMADOL HYDROCHLORIDE 50 MG: 50 TABLET, COATED ORAL at 09:26

## 2022-01-01 RX ADMIN — LINAGLIPTIN 5 MG: 5 TABLET, FILM COATED ORAL at 09:30

## 2022-01-01 RX ADMIN — LORAZEPAM 1 MG: 2 INJECTION INTRAMUSCULAR; INTRAVENOUS at 02:43

## 2022-01-01 RX ADMIN — CEFEPIME 2 G: 2 INJECTION, POWDER, FOR SOLUTION INTRAVENOUS at 17:17

## 2022-01-01 RX ADMIN — POLYETHYLENE GLYCOL 3350 17 G: 17 POWDER, FOR SOLUTION ORAL at 08:05

## 2022-01-01 RX ADMIN — MORPHINE SULFATE 2 MG: 2 INJECTION, SOLUTION INTRAMUSCULAR; INTRAVENOUS at 03:53

## 2022-01-01 RX ADMIN — CLONIDINE HYDROCHLORIDE 0.1 MG: 0.1 TABLET ORAL at 21:58

## 2022-01-01 RX ADMIN — ARIPIPRAZOLE 5 MG: 5 TABLET ORAL at 08:04

## 2022-01-01 RX ADMIN — POLYETHYLENE GLYCOL 3350 17 G: 17 POWDER, FOR SOLUTION ORAL at 08:36

## 2022-01-01 RX ADMIN — FUROSEMIDE 40 MG: 10 INJECTION, SOLUTION INTRAMUSCULAR; INTRAVENOUS at 17:13

## 2022-01-01 RX ADMIN — LORAZEPAM 1 MG: 2 INJECTION INTRAMUSCULAR; INTRAVENOUS at 13:45

## 2022-01-01 RX ADMIN — ARIPIPRAZOLE 2 MG: 2 TABLET ORAL at 08:41

## 2022-01-01 RX ADMIN — TRAMADOL HYDROCHLORIDE 50 MG: 50 TABLET, COATED ORAL at 21:08

## 2022-01-01 RX ADMIN — DULOXETINE HYDROCHLORIDE 60 MG: 60 CAPSULE, DELAYED RELEASE ORAL at 09:35

## 2022-01-01 RX ADMIN — LINAGLIPTIN 5 MG: 5 TABLET, FILM COATED ORAL at 09:35

## 2022-01-01 RX ADMIN — INSULIN LISPRO 8 UNITS: 100 INJECTION, SOLUTION INTRAVENOUS; SUBCUTANEOUS at 08:05

## 2022-01-01 RX ADMIN — ACETAMINOPHEN 650 MG: 650 SUPPOSITORY RECTAL at 10:27

## 2022-01-01 RX ADMIN — CEFEPIME 2 G: 2 INJECTION, POWDER, FOR SOLUTION INTRAVENOUS at 04:50

## 2022-01-01 RX ADMIN — VANCOMYCIN HYDROCHLORIDE 2750 MG: 10 INJECTION, POWDER, LYOPHILIZED, FOR SOLUTION INTRAVENOUS at 16:01

## 2022-01-01 RX ADMIN — TRAMADOL HYDROCHLORIDE 50 MG: 50 TABLET ORAL at 15:58

## 2022-01-01 RX ADMIN — LEVOTHYROXINE SODIUM 150 MCG: 0.15 TABLET ORAL at 15:58

## 2022-01-01 RX ADMIN — TRAMADOL HYDROCHLORIDE 50 MG: 50 TABLET, COATED ORAL at 22:04

## 2022-01-01 RX ADMIN — LORAZEPAM 1 MG: 2 INJECTION INTRAMUSCULAR; INTRAVENOUS at 09:26

## 2022-01-01 RX ADMIN — Medication 2000 UNITS: at 09:35

## 2022-01-01 RX ADMIN — CLONIDINE HYDROCHLORIDE 0.1 MG: 0.1 TABLET ORAL at 08:04

## 2022-01-01 RX ADMIN — Medication 1000 MCG: at 09:27

## 2022-01-01 RX ADMIN — DULOXETINE HYDROCHLORIDE 60 MG: 60 CAPSULE, DELAYED RELEASE ORAL at 22:48

## 2022-01-01 RX ADMIN — CLONIDINE HYDROCHLORIDE 0.1 MG: 0.1 TABLET ORAL at 22:04

## 2022-01-01 RX ADMIN — FUROSEMIDE 20 MG: 20 TABLET ORAL at 09:30

## 2022-01-01 RX ADMIN — CLONIDINE HYDROCHLORIDE 0.1 MG: 0.1 TABLET ORAL at 21:45

## 2022-01-01 RX ADMIN — ARIPIPRAZOLE 5 MG: 5 TABLET ORAL at 15:58

## 2022-01-01 RX ADMIN — AMLODIPINE BESYLATE 10 MG: 10 TABLET ORAL at 09:35

## 2022-01-01 RX ADMIN — CLONIDINE HYDROCHLORIDE 0.1 MG: 0.1 TABLET ORAL at 21:16

## 2022-01-01 RX ADMIN — Medication 10 ML: at 08:05

## 2022-01-01 RX ADMIN — LINAGLIPTIN 5 MG: 5 TABLET, FILM COATED ORAL at 08:42

## 2022-01-01 RX ADMIN — CEFTRIAXONE SODIUM 1 G: 1 INJECTION, POWDER, FOR SOLUTION INTRAMUSCULAR; INTRAVENOUS at 00:26

## 2022-01-01 RX ADMIN — INSULIN LISPRO 2 UNITS: 100 INJECTION, SOLUTION INTRAVENOUS; SUBCUTANEOUS at 06:40

## 2022-01-01 RX ADMIN — LEVOTHYROXINE SODIUM 125 MCG: 0.12 TABLET ORAL at 05:53

## 2022-01-01 RX ADMIN — CEFEPIME 2 G: 2 INJECTION, POWDER, FOR SOLUTION INTRAVENOUS at 19:06

## 2022-01-01 RX ADMIN — DULOXETINE HYDROCHLORIDE 60 MG: 60 CAPSULE, DELAYED RELEASE ORAL at 21:08

## 2022-01-01 RX ADMIN — ACETAMINOPHEN 650 MG: 325 TABLET, FILM COATED ORAL at 14:12

## 2022-01-01 RX ADMIN — INSULIN GLARGINE-YFGN 30 UNITS: 100 INJECTION, SOLUTION SUBCUTANEOUS at 21:25

## 2022-01-01 RX ADMIN — ACETAMINOPHEN 650 MG: 325 TABLET, FILM COATED ORAL at 05:53

## 2022-01-01 RX ADMIN — FUROSEMIDE 20 MG: 20 TABLET ORAL at 08:41

## 2022-01-01 RX ADMIN — LEVOTHYROXINE SODIUM 125 MCG: 0.12 TABLET ORAL at 08:40

## 2022-01-01 RX ADMIN — FUROSEMIDE 40 MG: 10 INJECTION, SOLUTION INTRAMUSCULAR; INTRAVENOUS at 23:25

## 2022-01-01 RX ADMIN — ACETAMINOPHEN 650 MG: 325 TABLET, FILM COATED ORAL at 11:11

## 2022-01-01 RX ADMIN — BENZONATATE 100 MG: 100 CAPSULE ORAL at 17:13

## 2022-01-01 RX ADMIN — MORPHINE SULFATE 2 MG: 2 INJECTION, SOLUTION INTRAMUSCULAR; INTRAVENOUS at 15:08

## 2022-01-01 RX ADMIN — CLONIDINE HYDROCHLORIDE 0.1 MG: 0.1 TABLET ORAL at 09:35

## 2022-01-01 RX ADMIN — APIXABAN 5 MG: 5 TABLET, FILM COATED ORAL at 08:41

## 2022-01-01 RX ADMIN — MORPHINE SULFATE 2 MG: 2 INJECTION, SOLUTION INTRAMUSCULAR; INTRAVENOUS at 09:26

## 2022-01-01 RX ADMIN — TRAMADOL HYDROCHLORIDE 50 MG: 50 TABLET, COATED ORAL at 08:36

## 2022-01-01 RX ADMIN — DULOXETINE HYDROCHLORIDE 60 MG: 60 CAPSULE, DELAYED RELEASE ORAL at 09:29

## 2022-01-01 RX ADMIN — AMLODIPINE BESYLATE 10 MG: 10 TABLET ORAL at 08:37

## 2022-01-01 RX ADMIN — CEFEPIME 2 G: 2 INJECTION, POWDER, FOR SOLUTION INTRAVENOUS at 05:53

## 2022-01-01 RX ADMIN — TRAMADOL HYDROCHLORIDE 50 MG: 50 TABLET, COATED ORAL at 17:08

## 2022-01-01 RX ADMIN — CLONIDINE HYDROCHLORIDE 0.1 MG: 0.1 TABLET ORAL at 22:48

## 2022-01-01 RX ADMIN — INSULIN GLARGINE-YFGN 20 UNITS: 100 INJECTION, SOLUTION SUBCUTANEOUS at 21:45

## 2022-01-01 RX ADMIN — TRAMADOL HYDROCHLORIDE 50 MG: 50 TABLET, COATED ORAL at 08:40

## 2022-01-01 RX ADMIN — DULOXETINE HYDROCHLORIDE 60 MG: 60 CAPSULE, DELAYED RELEASE ORAL at 21:17

## 2022-01-01 RX ADMIN — TRAMADOL HYDROCHLORIDE 50 MG: 50 TABLET, COATED ORAL at 09:34

## 2022-01-01 RX ADMIN — ROSUVASTATIN CALCIUM 20 MG: 20 TABLET, FILM COATED ORAL at 21:17

## 2022-01-01 RX ADMIN — DICLOFENAC SODIUM 4 G: 10 GEL TOPICAL at 08:05

## 2022-01-01 RX ADMIN — DICLOFENAC SODIUM 4 G: 10 GEL TOPICAL at 22:49

## 2022-01-01 RX ADMIN — LORAZEPAM 2 MG: 2 INJECTION INTRAMUSCULAR; INTRAVENOUS at 15:08

## 2022-01-01 RX ADMIN — DULOXETINE HYDROCHLORIDE 60 MG: 60 CAPSULE, DELAYED RELEASE ORAL at 08:04

## 2022-09-08 PROBLEM — N39.0 UTI (URINARY TRACT INFECTION), UNCOMPLICATED: Status: ACTIVE | Noted: 2022-01-01

## 2022-09-08 NOTE — PROGRESS NOTES
Clinical Pharmacy Services: Medication History    Jolanta Phipps is a 87 y.o. female presenting to [facility] for   Chief Complaint   Patient presents with   • Abnormal Lab       She  has a past medical history of CHF (congestive heart failure) ([facility]), CKD (chronic kidney disease), Depression, Diabetes mellitus ([facility]), Fibromyalgia, Hyperlipidemia, Hypertension, Hypothyroidism, and Stroke ([facility]).    Allergies as of 09/08/2022 - Reviewed 09/08/2022   Allergen Reaction Noted   • Aldactone [spironolactone] Unknown - Low Severity 04/16/2021   • Atenolol Unknown - Low Severity 04/16/2021   • Atorvastatin Unknown - Low Severity 04/16/2021   • Cholestyramine Unknown - Low Severity 04/16/2021   • Ezetimibe Unknown - Low Severity 04/16/2021   • Hctz [hydrochlorothiazide] Unknown - Low Severity 04/16/2021   • Lisinopril Unknown - Low Severity 04/16/2021   • Losartan Unknown - Low Severity 04/16/2021   • Lyrica [pregabalin] Unknown - Low Severity 04/16/2021   • Metformin Unknown - Low Severity 04/16/2021   • Pork-derived products GI Intolerance 04/16/2021   • Sulfa antibiotics Unknown - Low Severity 04/16/2021   • Tapentadol Unknown - Low Severity 04/16/2021   • Valsartan Unknown - Low Severity 04/16/2021       Medication information was obtained from: [facility] Paperwork  Pharmacy and Phone Number:     Prior to Admission Medications     Prescriptions Last Dose Informant Patient Reported? Taking?    acetaminophen (TYLENOL) 650 MG 8 hr tablet  Nursing Home Yes Yes    Take 650 mg by mouth 3 (Three) Times a Day.    amLODIPine (NORVASC) 10 MG tablet  Nursing Home Yes Yes    Take 10 mg by mouth Daily.    apixaban (ELIQUIS) 5 MG tablet tablet  Nursing Home Yes Yes    Take 5 mg by mouth 2 (Two) Times a Day.    ARIPiprazole (ABILIFY) 2 MG tablet  Nursing Home Yes Yes    Take 2 mg by mouth Daily.    bisacodyl (DULCOLAX) 10 MG suppository  Nursing Home Yes Yes    Insert 10 mg into the rectum Daily As Needed.     Cholecalciferol (Vitamin D3) 1.25 MG (83522 UT) tablet  Nursing Home Yes Yes    Take 5,000 Units by mouth Daily.    cloNIDine (CATAPRES) 0.1 MG tablet  Nursing Home Yes Yes    Take 0.1 mg by mouth 2 (Two) Times a Day.    Diclofenac Sodium (VOLTAREN) 1 % gel gel  Nursing Home Yes Yes    Apply 4 g topically to the appropriate area as directed 2 (Two) Times a Day.    Dulaglutide (Trulicity) 0.75 MG/0.5ML solution pen-injector  Nursing Home Yes Yes    Inject 0.75 mg under the skin into the appropriate area as directed 1 (One) Time Per Week. Tuesday    DULoxetine HCl (DRIZALMA) 60 MG capsule  Nursing Home Yes Yes    Take 60 mg by mouth 2 (Two) Times a Day.    furosemide (LASIX) 20 MG tablet  Nursing Home No Yes    Take 1 tablet by mouth Daily.    insulin glargine (LANTUS, SEMGLEE) 100 UNIT/ML injection  Nursing Home No Yes    Inject 30 Units under the skin into the appropriate area as directed 2 (Two) Times a Day.    Patient taking differently:  Inject 38 Units under the skin into the appropriate area as directed 2 (Two) Times a Day.    insulin lispro (ADMELOG) 100 UNIT/ML injection  Nursing Home No Yes    Inject 0-14 Units under the skin into the appropriate area as directed 3 (Three) Times a Day Before Meals.    Patient taking differently:  Inject  under the skin into the appropriate area as directed 3 (Three) Times a Day Before Meals. Sliding Scale    levothyroxine (SYNTHROID, LEVOTHROID) 125 MCG tablet  Nursing Home Yes Yes    Take 125 mcg by mouth Daily.    linagliptin (TRADJENTA) 5 MG tablet tablet  Nursing Home Yes Yes    Take 5 mg by mouth Daily.    linezolid (ZYVOX) 600 MG tablet  Nursing Home Yes Yes    Take 600 mg by mouth 2 (Two) Times a Day. UTI    methylPREDNISolone (MEDROL) 4 MG tablet  Nursing Home Yes Yes    Take 4 mg by mouth Daily.    muscle rub (BenGay) 10-15 % cream cream  Nursing Home Yes Yes    Apply 1 application topically to the appropriate area as directed Every 4 (Four) Hours As Needed. Apply  to Left Shoulder    ondansetron (ZOFRAN) 4 MG tablet  Nursing Home Yes Yes    Take 4 mg by mouth Every 8 (Eight) Hours As Needed for Nausea or Vomiting.    polyethylene glycol (MIRALAX) 17 g packet  Nursing Home Yes Yes    Take 17 g by mouth Daily.    rosuvastatin (CRESTOR) 20 MG tablet  Nursing Home Yes Yes    Take 20 mg by mouth Every Night.    traMADol (ULTRAM) 50 MG tablet  Nursing Home Yes Yes    Take 50 mg by mouth 3 (Three) Times a Day.    vitamin B-12 (CYANOCOBALAMIN) 1000 MCG tablet  Nursing Home Yes Yes    Take 1,000 mcg by mouth Daily.            Medication notes:     This medication list is complete to the best of my knowledge as of 9/8/2022    Please call if questions.    Ana Hodges  Medication History Technician   [phone]    9/8/2022 13:41 EDT

## 2022-09-08 NOTE — ED PROVIDER NOTES
EMERGENCY DEPARTMENT ENCOUNTER    Room Number:  27/27  Date of encounter:  9/8/2022  PCP: Lizz Sanchez APRN  Historian: patient, nursing home staff,   Full history not obtainable due to: Cognitive communication deficit     HPI:  Chief Complaint: Dysuria, abnormal lab     Context: Jolanta Phipps is a 87 y.o. female who presents to the ED c/o dysuria onset Sept 2 per son. She has complained specifically of burning when she urinates. No fever. No vomiting. No change in her mental status. She is a resident of [facility] and a urine culture resulted showing she required IV abx which they are unable to provide. She was sent to the ED for further evaluation.     Hx is provided mostly by son who is present at bedside. Hx is limited secondary to cognitive communication deficit.          PAST MEDICAL HISTORY    Active Ambulatory Problems     Diagnosis Date Noted   • Acute UTI 12/08/2021   • Acute metabolic encephalopathy 12/08/2021   • Diarrhea 12/08/2021   • Cognitive communication deficit 12/08/2021   • History of right MCA stroke 12/08/2021   • Type 2 diabetes mellitus with hyperglycemia, with long-term current use of insulin ([facility]) 12/08/2021   • Hypertension    • DNR (do not resuscitate)    • Nursing home resident    • Obesity, Class III, BMI 40-49.9 (morbid obesity) ([facility]) 12/12/2021     Resolved Ambulatory Problems     Diagnosis Date Noted   • No Resolved Ambulatory Problems     Past Medical History:   Diagnosis Date   • CHF (congestive heart failure) ([facility])    • CKD (chronic kidney disease)    • Depression    • Diabetes mellitus ([facility])    • Fibromyalgia    • Hyperlipidemia    • Hypothyroidism    • Stroke ([facility])          PAST SURGICAL HISTORY  No past surgical history on file.      FAMILY HISTORY  No family history on file.      SOCIAL HISTORY  Social History     Socioeconomic History   • Marital status:    Tobacco Use   • Smoking status: Former Smoker   • Smokeless tobacco: Never Used          ALLERGIES  Aldactone [spironolactone], Atenolol, Atorvastatin, Cholestyramine, Ezetimibe, Hctz [hydrochlorothiazide], Lisinopril, Losartan, Lyrica [pregabalin], Metformin, Pork-derived products, Sulfa antibiotics, Tapentadol, and Valsartan        REVIEW OF SYSTEMS  Review of Systems   All systems reviewed and marked as negative except as listed in HPI       PHYSICAL EXAM    I have reviewed the triage vital signs and nursing notes.    ED Triage Vitals [09/08/22 1130]   Temp Heart Rate Resp BP SpO2   98.7 °F (37.1 °C) 71 16 (!) 164/102 92 %      Temp src Heart Rate Source Patient Position BP Location FiO2 (%)   Tympanic -- -- -- --       GENERAL: Alert well developed, well nourished in no distress. Obese.   HENT: NCAT, neck supple, trachea midline  EYES: no scleral icterus, PERRL, normal conjunctivae  CV: regular rhythm, regular rate, no murmur  RESPIRATORY: unlabored effort, CTAB  ABDOMEN: soft, nontender, nondistended, bowel sounds present  MUSCULOSKELETAL: no gross deformity  NEURO: alert,  sensory and motor function of extremities grossly intact, speech clear, mental status normal/baseline  SKIN: warm, dry, no rash  PSYCH:  Appropriate mood and affect    Vital signs and nursing notes reviewed.          LAB RESULTS  Recent Results (from the past 24 hour(s))   Comprehensive Metabolic Panel    Collection Time: 09/08/22 12:07 PM    Specimen: Blood   Result Value Ref Range    Glucose 157 (H) 65 - 99 mg/dL    BUN 14 8 - 23 mg/dL    Creatinine 0.90 0.57 - 1.00 mg/dL    Sodium 138 136 - 145 mmol/L    Potassium 4.8 3.5 - 5.2 mmol/L    Chloride 101 98 - 107 mmol/L    CO2 30.4 (H) 22.0 - 29.0 mmol/L    Calcium 10.0 8.6 - 10.5 mg/dL    Total Protein 6.3 6.0 - 8.5 g/dL    Albumin 3.40 (L) 3.50 - 5.20 g/dL    ALT (SGPT) 15 1 - 33 U/L    AST (SGOT) 17 1 - 32 U/L    Alkaline Phosphatase 82 39 - 117 U/L    Total Bilirubin 0.4 0.0 - 1.2 mg/dL    Globulin 2.9 gm/dL    A/G Ratio 1.2 g/dL    BUN/Creatinine Ratio 15.6 7.0 -  25.0    Anion Gap 6.6 5.0 - 15.0 mmol/L    eGFR 62.0 >60.0 mL/min/1.73   CBC Auto Differential    Collection Time: 09/08/22 12:07 PM    Specimen: Blood   Result Value Ref Range    WBC 8.67 3.40 - 10.80 10*3/mm3    RBC 4.48 3.77 - 5.28 10*6/mm3    Hemoglobin 12.8 12.0 - 15.9 g/dL    Hematocrit 40.4 34.0 - 46.6 %    MCV 90.2 79.0 - 97.0 fL    MCH 28.6 26.6 - 33.0 pg    MCHC 31.7 31.5 - 35.7 g/dL    RDW 13.5 12.3 - 15.4 %    RDW-SD 44.3 37.0 - 54.0 fl    MPV 9.7 6.0 - 12.0 fL    Platelets 243 140 - 450 10*3/mm3    Neutrophil % 61.7 42.7 - 76.0 %    Lymphocyte % 27.5 19.6 - 45.3 %    Monocyte % 6.6 5.0 - 12.0 %    Eosinophil % 3.3 0.3 - 6.2 %    Basophil % 0.3 0.0 - 1.5 %    Immature Grans % 0.6 (H) 0.0 - 0.5 %    Neutrophils, Absolute 5.35 1.70 - 7.00 10*3/mm3    Lymphocytes, Absolute 2.38 0.70 - 3.10 10*3/mm3    Monocytes, Absolute 0.57 0.10 - 0.90 10*3/mm3    Eosinophils, Absolute 0.29 0.00 - 0.40 10*3/mm3    Basophils, Absolute 0.03 0.00 - 0.20 10*3/mm3    Immature Grans, Absolute 0.05 0.00 - 0.05 10*3/mm3    nRBC 0.0 0.0 - 0.2 /100 WBC   Green Top (Gel)    Collection Time: 09/08/22 12:07 PM   Result Value Ref Range    Extra Tube Hold for add-ons.    Lavender Top    Collection Time: 09/08/22 12:07 PM   Result Value Ref Range    Extra Tube hold for add-on    Gold Top - SST    Collection Time: 09/08/22 12:07 PM   Result Value Ref Range    Extra Tube Hold for add-ons.    Light Blue Top    Collection Time: 09/08/22 12:07 PM   Result Value Ref Range    Extra Tube Hold for add-ons.    Urinalysis With Culture If Indicated - Urine, Catheter In/Out    Collection Time: 09/08/22  1:10 PM    Specimen: Urine, Catheter In/Out   Result Value Ref Range    Color, UA Yellow Yellow, Straw    Appearance, UA Cloudy (A) Clear    pH, UA 7.5 5.0 - 8.0    Specific Gravity, UA 1.007 1.005 - 1.030    Glucose, UA Negative Negative    Ketones, UA Negative Negative    Bilirubin, UA Negative Negative    Blood, UA Moderate (2+) (A) Negative     Protein, UA 30 mg/dL (1+) (A) Negative    Leuk Esterase, UA Large (3+) (A) Negative    Nitrite, UA Negative Negative    Urobilinogen, UA 0.2 E.U./dL 0.2 - 1.0 E.U./dL   Urinalysis, Microscopic Only - Urine, Catheter In/Out    Collection Time: 09/08/22  1:10 PM    Specimen: Urine, Catheter In/Out   Result Value Ref Range    RBC, UA 3-5 (A) None Seen, 0-2 /HPF    WBC, UA Too Numerous to Count (A) None Seen, 0-2 /HPF    Bacteria, UA None Seen None Seen /HPF    Squamous Epithelial Cells, UA 0-2 None Seen, 0-2 /HPF    Hyaline Casts, UA None Seen None Seen /LPF    Methodology Automated Microscopy        Ordered the above labs and independently reviewed the results.        RADIOLOGY  XR Chest 1 View    Result Date: 9/8/2022  XR CHEST 1 VW-  HISTORY: Female who is 87 years-old,  cough  TECHNIQUE: Frontal view of the chest  COMPARISON: 12/8/2021  FINDINGS: The heart size is normal. Aorta is calcified. Pulmonary vasculature is unremarkable. Minimal likely atelectasis or scarring at the left lateral costophrenic angle. No focal pulmonary consolidation, pleural effusion, or pneumothorax. No acute osseous process.      Minimal likely atelectasis or scarring at the left lateral costophrenic angle. Follow-up as clinical indications persist.  This report was finalized on 9/8/2022 1:11 PM by Dr. Donato Celis M.D.        I ordered the above noted radiological studies. Independently reviewed by me and discussed with radiologist.  See dictation above for official radiology interpretation.      PROCEDURES    Procedures        MEDICATIONS GIVEN IN ER    Medications   sodium chloride 0.9 % flush 10 mL (has no administration in time range)   vancomycin 2750 mg/1000 mL 0.9% NS IVPB (has no administration in time range)   cefepime 2 gm IVPB in 100 ml NS (VTB) (has no administration in time range)         PROGRESS, DATA ANALYSIS, CONSULTS, AND MEDICAL DECISION MAKING    All labs have been independently reviewed by me.  All radiology  studies have been reviewed by me.   EKG's independently reviewed by me.  Discussion below represents my analysis of pertinent findings related to patient's condition, differential diagnosis, treatment plan and final disposition.    DIFFERENTIAL DIAGNOSIS INCLUDE BUT NOT LIMITED TO: complicated uti, urine colonization, pyelonephritis, sepsis     ED Course as of 09/08/22 1419   Thu Sep 08, 2022   1329 I reviewed the patient's lab results from [facility].  This seems to be oddly presented in terms of its results on antibiotic resistance and pathogens.  This laboratory makes recommendations for best antibiotic choice and it recommends fosfomycin and linezolid for treating a simple UTI.  This seems rather incongruous with IDSA guidelines for a simple UTI.    Frankly I have some doubts about whether or not this is truly UTI although the history is rather convoluted in addition to the lab results.  Therefore, I think it is best to admit her for vancomycin and cefepime antibiotic treatment and I think she would likely benefit from infectious disease consult while our own urine culture is pending. [TD]   1330 Medical records reviewed as the pt was sent with a copy of her cultur report. Urine cultured + for staph, proteus, pseudomonas and enterococcus. Due to her current medications she is unable to take any po medications at the facility. I discussed this with the pharmacist who recommends vancomycin and cefepime. Will plan to admit the pt to the hospital . [JS]   1333 WBC, UA(!): Too Numerous to Count [JS]   1333 RBC, UA(!): 3-5 [JS]   1333 Blood, UA(!): Moderate (2+) [JS]   1333 Protein, UA(!): 30 mg/dL (1+) [JS]   1333 Leukocytes, UA(!): Large (3+) [JS]   1334 WBC, UA(!): Too Numerous to Count [JS]   1416 Discussed pt with Dr Sands who agrees to admit the pt  [JS]      ED Course User Index  [JS] Ronda Amaya APRN  [TD] Wally Rogers II, MD         BP - 179/90  HR - 60  TEMP - 98.7 °F  (37.1 °C) (Tympanic)  O2 SATS - 94%        DIAGNOSIS  Final diagnoses:   UTI (urinary tract infection), uncomplicated   Infection of drug-resistant bacteria         DISPOSITION  Admission     Pt masked in first look. I wore appropriate PPE throughout my encounters with the pt. I performed hand hygiene on entry into the pt room and upon exit.     Dictated utilizing Dragon dictation:  Much of this encounter note is an electronic transcription/translation of spoken language to printed text.      Ronda Amaya, APRN  09/08/22 [id number]

## 2022-09-08 NOTE — PLAN OF CARE
Goal Outcome Evaluation:   Pt from ED with UTI. Alert and oriented x 4. Pt states has bed bedridden for past year due to fall, fx bilateral femurs, son confirms. Sates unable to bear weight. Right foot and ankle with 3 plus edema. Left foot trace edema. No c/o pain. Very pleasant.

## 2022-09-08 NOTE — ED PROVIDER NOTES
MD ATTESTATION NOTE    The WILLA and I have discussed this patient's history, physical exam, and treatment plan.    I provided a substantive portion of the care of this patient. I personally performed the physical exam, in its entirety. The attached note describes my personal findings.      Jolanta Phipps is a 87 y.o. female who presents to the ED c/o dysuria.  Reported onset of September 2 per the son.  She was sent to the emergency department because outside records recommended initiating fosfomycin and linezolid.  Due to linezolid and her use of Cymbalta and tramadol, she was felt to need IV antibiotics.      On exam:  GENERAL: not distressed  HENT: nares patent  EYES: no scleral icterus  CV: regular rhythm, regular rate  RESPIRATORY: normal effort  ABDOMEN: soft, nontender  MUSCULOSKELETAL: no deformity  NEURO: alert, moves all extremities, follows commands  SKIN: warm, dry    Labs  Recent Results (from the past 24 hour(s))   Comprehensive Metabolic Panel    Collection Time: 09/08/22 12:07 PM    Specimen: Blood   Result Value Ref Range    Glucose 157 (H) 65 - 99 mg/dL    BUN 14 8 - 23 mg/dL    Creatinine 0.90 0.57 - 1.00 mg/dL    Sodium 138 136 - 145 mmol/L    Potassium 4.8 3.5 - 5.2 mmol/L    Chloride 101 98 - 107 mmol/L    CO2 30.4 (H) 22.0 - 29.0 mmol/L    Calcium 10.0 8.6 - 10.5 mg/dL    Total Protein 6.3 6.0 - 8.5 g/dL    Albumin 3.40 (L) 3.50 - 5.20 g/dL    ALT (SGPT) 15 1 - 33 U/L    AST (SGOT) 17 1 - 32 U/L    Alkaline Phosphatase 82 39 - 117 U/L    Total Bilirubin 0.4 0.0 - 1.2 mg/dL    Globulin 2.9 gm/dL    A/G Ratio 1.2 g/dL    BUN/Creatinine Ratio 15.6 7.0 - 25.0    Anion Gap 6.6 5.0 - 15.0 mmol/L    eGFR 62.0 >60.0 mL/min/1.73   CBC Auto Differential    Collection Time: 09/08/22 12:07 PM    Specimen: Blood   Result Value Ref Range    WBC 8.67 3.40 - 10.80 10*3/mm3    RBC 4.48 3.77 - 5.28 10*6/mm3    Hemoglobin 12.8 12.0 - 15.9 g/dL    Hematocrit 40.4 34.0 - 46.6 %    MCV 90.2 79.0 - 97.0 fL    MCH 28.6  26.6 - 33.0 pg    MCHC 31.7 31.5 - 35.7 g/dL    RDW 13.5 12.3 - 15.4 %    RDW-SD 44.3 37.0 - 54.0 fl    MPV 9.7 6.0 - 12.0 fL    Platelets 243 140 - 450 10*3/mm3    Neutrophil % 61.7 42.7 - 76.0 %    Lymphocyte % 27.5 19.6 - 45.3 %    Monocyte % 6.6 5.0 - 12.0 %    Eosinophil % 3.3 0.3 - 6.2 %    Basophil % 0.3 0.0 - 1.5 %    Immature Grans % 0.6 (H) 0.0 - 0.5 %    Neutrophils, Absolute 5.35 1.70 - 7.00 10*3/mm3    Lymphocytes, Absolute 2.38 0.70 - 3.10 10*3/mm3    Monocytes, Absolute 0.57 0.10 - 0.90 10*3/mm3    Eosinophils, Absolute 0.29 0.00 - 0.40 10*3/mm3    Basophils, Absolute 0.03 0.00 - 0.20 10*3/mm3    Immature Grans, Absolute 0.05 0.00 - 0.05 10*3/mm3    nRBC 0.0 0.0 - 0.2 /100 WBC   Green Top (Gel)    Collection Time: 09/08/22 12:07 PM   Result Value Ref Range    Extra Tube Hold for add-ons.    Lavender Top    Collection Time: 09/08/22 12:07 PM   Result Value Ref Range    Extra Tube hold for add-on    Gold Top - SST    Collection Time: 09/08/22 12:07 PM   Result Value Ref Range    Extra Tube Hold for add-ons.    Light Blue Top    Collection Time: 09/08/22 12:07 PM   Result Value Ref Range    Extra Tube Hold for add-ons.    Urinalysis With Culture If Indicated - Urine, Catheter In/Out    Collection Time: 09/08/22  1:10 PM    Specimen: Urine, Catheter In/Out   Result Value Ref Range    Color, UA Yellow Yellow, Straw    Appearance, UA Cloudy (A) Clear    pH, UA 7.5 5.0 - 8.0    Specific Gravity, UA 1.007 1.005 - 1.030    Glucose, UA Negative Negative    Ketones, UA Negative Negative    Bilirubin, UA Negative Negative    Blood, UA Moderate (2+) (A) Negative    Protein, UA 30 mg/dL (1+) (A) Negative    Leuk Esterase, UA Large (3+) (A) Negative    Nitrite, UA Negative Negative    Urobilinogen, UA 0.2 E.U./dL 0.2 - 1.0 E.U./dL   Urinalysis, Microscopic Only - Urine, Catheter In/Out    Collection Time: 09/08/22  1:10 PM    Specimen: Urine, Catheter In/Out   Result Value Ref Range    RBC, UA 3-5 (A) None Seen, 0-2  /HPF    WBC, UA Too Numerous to Count (A) None Seen, 0-2 /HPF    Bacteria, UA None Seen None Seen /HPF    Squamous Epithelial Cells, UA 0-2 None Seen, 0-2 /HPF    Hyaline Casts, UA None Seen None Seen /LPF    Methodology Automated Microscopy        Radiology  XR Chest 1 View    Result Date: 9/8/2022  XR CHEST 1 VW-  HISTORY: Female who is 87 years-old,  cough  TECHNIQUE: Frontal view of the chest  COMPARISON: 12/8/2021  FINDINGS: The heart size is normal. Aorta is calcified. Pulmonary vasculature is unremarkable. Minimal likely atelectasis or scarring at the left lateral costophrenic angle. No focal pulmonary consolidation, pleural effusion, or pneumothorax. No acute osseous process.      Minimal likely atelectasis or scarring at the left lateral costophrenic angle. Follow-up as clinical indications persist.  This report was finalized on 9/8/2022 1:11 PM by Dr. Donato Celis M.D.        Medical Decision Making:  ED Course as of 09/08/22 1455   Thu Sep 08, 2022   1329 I reviewed the patient's lab results from [facility].  This seems to be oddly presented in terms of its results on antibiotic resistance and pathogens.  This laboratory makes recommendations for best antibiotic choice and it recommends fosfomycin and linezolid for treating a simple UTI.  This seems rather incongruous with IDSA guidelines for a simple UTI.    Frankly I have some doubts about whether or not this is truly UTI although the history is rather convoluted in addition to the lab results.  Therefore, I think it is best to admit her for vancomycin and cefepime antibiotic treatment and I think she would likely benefit from infectious disease consult while our own urine culture is pending. [TD]   1330 Medical records reviewed as the pt was sent with a copy of her cultur report. Urine cultured + for staph, proteus, pseudomonas and enterococcus. Due to her current medications she is unable to take any po medications at the  facility. I discussed this with the pharmacist who recommends vancomycin and cefepime. Will plan to admit the pt to the hospital . [JS]   1333 WBC, UA(!): Too Numerous to Count [JS]   1333 RBC, UA(!): 3-5 [JS]   1333 Blood, UA(!): Moderate (2+) [JS]   1333 Protein, UA(!): 30 mg/dL (1+) [JS]   1333 Leukocytes, UA(!): Large (3+) [JS]   1334 WBC, UA(!): Too Numerous to Count [JS]   1416 Discussed pt with Dr Sands who agrees to admit the pt  [JS]      ED Course User Index  [JS] Ronda Amaya APRN  [TD] Wally Rogers II, MD           PPE: Both the patient and I wore a surgical mask throughout the entire patient encounter. I wore protective goggles.     Diagnosis  Final diagnoses:   UTI (urinary tract infection), uncomplicated   Infection of drug-resistant bacteria        Wally Rogers II, MD  09/10/22 1909

## 2022-09-08 NOTE — H&P
Internal medicine history and physical  INTERNAL MEDICINE   [facility]       Patient Identification:  Name: Jolanta Phipps  Age: 87 y.o.  Sex: female  :  1934  MRN: [id number]                   Primary Care Physician: Lizz Sanchez APRN                               Date of admission:2022    Chief Complaint: Sent from nursing home for IV antibiotics treatment for multidrug-resistant urinary tract infection diagnosed at the nursing home but symptoms ongoing since the second of this month.    History of Present Illness:   Patient is 87-year-old female who has complicated past medical history as listed below who is a resident of nursing home and has had prior history of urinary tract infection was having symptoms of burning urination frequency and urgency and was tested for UTI and was found to have apparently drug-resistant pathogens for which oral therapy was not available without causing adverse interaction.  The details of the culture results are not available but apparently patient was recommended to take fosfomycin and linezolid at the facility for the pathogen found in the urine culture.  Patient was otherwise feeling fine except for mild confusion and disorientation.  In the emergency room patient was started on vancomycin and cefepime and is being admitted for further care.  She did have abnormal urinalysis.  Upon review of records and discussion with ER provider it appears that the urine culture was positive for Pseudomonas staph Proteus and Enterococcus species.      Past Medical History:  Past Medical History:   Diagnosis Date   • CHF (congestive heart failure) ([facility])    • CKD (chronic kidney disease)    • Depression    • Diabetes mellitus ([facility])    • Fibromyalgia    • Hyperlipidemia    • Hypertension    • Hypothyroidism    • Stroke ([facility])      Past Surgical History:  No past surgical history on file.   Home Meds:  Medications Prior to Admission   Medication Sig Dispense Refill  Last Dose   • acetaminophen (TYLENOL) 650 MG 8 hr tablet Take 650 mg by mouth 3 (Three) Times a Day.      • amLODIPine (NORVASC) 10 MG tablet Take 10 mg by mouth Daily.      • apixaban (ELIQUIS) 5 MG tablet tablet Take 5 mg by mouth 2 (Two) Times a Day.      • ARIPiprazole (ABILIFY) 2 MG tablet Take 2 mg by mouth Daily.      • bisacodyl (DULCOLAX) 10 MG suppository Insert 10 mg into the rectum Daily As Needed.      • Cholecalciferol (Vitamin D3) 1.25 MG (91136 UT) tablet Take 5,000 Units by mouth Daily.      • cloNIDine (CATAPRES) 0.1 MG tablet Take 0.1 mg by mouth 2 (Two) Times a Day.      • Diclofenac Sodium (VOLTAREN) 1 % gel gel Apply 4 g topically to the appropriate area as directed 2 (Two) Times a Day.      • Dulaglutide (Trulicity) 0.75 MG/0.5ML solution pen-injector Inject 0.75 mg under the skin into the appropriate area as directed 1 (One) Time Per Week. Tuesday      • DULoxetine HCl (DRIZALMA) 60 MG capsule Take 60 mg by mouth 2 (Two) Times a Day.      • furosemide (LASIX) 20 MG tablet Take 1 tablet by mouth Daily. 30 tablet 0    • insulin glargine (LANTUS, SEMGLEE) 100 UNIT/ML injection Inject 30 Units under the skin into the appropriate area as directed 2 (Two) Times a Day. (Patient taking differently: Inject 38 Units under the skin into the appropriate area as directed 2 (Two) Times a Day.) 100 mL 12    • insulin lispro (ADMELOG) 100 UNIT/ML injection Inject 0-14 Units under the skin into the appropriate area as directed 3 (Three) Times a Day Before Meals. (Patient taking differently: Inject  under the skin into the appropriate area as directed 3 (Three) Times a Day Before Meals. Sliding Scale) 100 mL 12    • levothyroxine (SYNTHROID, LEVOTHROID) 125 MCG tablet Take 125 mcg by mouth Daily.      • linagliptin (TRADJENTA) 5 MG tablet tablet Take 5 mg by mouth Daily.      • linezolid (ZYVOX) 600 MG tablet Take 600 mg by mouth 2 (Two) Times a Day. UTI      • methylPREDNISolone (MEDROL) 4 MG tablet Take 4 mg  by mouth Daily.      • muscle rub (BenGay) 10-15 % cream cream Apply 1 application topically to the appropriate area as directed Every 4 (Four) Hours As Needed. Apply to Left Shoulder      • ondansetron (ZOFRAN) 4 MG tablet Take 4 mg by mouth Every 8 (Eight) Hours As Needed for Nausea or Vomiting.      • polyethylene glycol (MIRALAX) 17 g packet Take 17 g by mouth Daily.      • rosuvastatin (CRESTOR) 20 MG tablet Take 20 mg by mouth Every Night.      • traMADol (ULTRAM) 50 MG tablet Take 50 mg by mouth 3 (Three) Times a Day.      • vitamin B-12 (CYANOCOBALAMIN) 1000 MCG tablet Take 1,000 mcg by mouth Daily.        Current Meds:     Current Facility-Administered Medications:   •  [COMPLETED] Insert peripheral IV, , , Once **AND** sodium chloride 0.9 % flush 10 mL, 10 mL, Intravenous, PRN, Ronda Amaya APRN  Allergies:  Allergies   Allergen Reactions   • Aldactone [Spironolactone] Unknown - Low Severity   • Atenolol Unknown - Low Severity   • Atorvastatin Unknown - Low Severity   • Cholestyramine Unknown - Low Severity   • Ezetimibe Unknown - Low Severity   • Hctz [Hydrochlorothiazide] Unknown - Low Severity   • Lisinopril Unknown - Low Severity   • Losartan Unknown - Low Severity   • Lyrica [Pregabalin] Unknown - Low Severity   • Metformin Unknown - Low Severity   • Pork-Derived Products GI Intolerance   • Sulfa Antibiotics Unknown - Low Severity   • Tapentadol Unknown - Low Severity   • Valsartan Unknown - Low Severity     Social History:   Social History     Tobacco Use   • Smoking status: Former Smoker   • Smokeless tobacco: Never Used   Substance Use Topics   • Alcohol use: Not on file      Family History:  No family history on file.       Review of Systems  See history of present illness and past medical history.   Remainder of ROS is negative.      Vitals:   BP (!) 182/86 (BP Location: Left arm, Patient Position: Lying) Comment: Reported to the nurse  Pulse 69   Temp 97.1 °F (36.2 °C) (Oral)   Resp  "18   Ht 167.6 cm (66\")   Wt (!) 138 kg (303 lb 9.2 oz)   SpO2 93%   BMI 49.00 kg/m²   I/O:     Intake/Output Summary (Last 24 hours) at 9/8/2022 1640  Last data filed at 9/8/2022 1606  Gross per 24 hour   Intake 100 ml   Output 350 ml   Net -250 ml     Exam:  Patient is examined using the personal protective equipment as per guidelines from infection control for this particular patient as enacted.  Hand washing was performed before and after patient interaction.  General Appearance:   Awake cooperative pleasantly confused female who does not appear to be in any acute distress and eating her dinner.   Head:    Normocephalic, without obvious abnormality, atraumatic   Eyes:    PERRL, conjunctiva/corneas clear, EOM's intact, both eyes   Ears:    Normal external ear canals, both ears   Nose:   Nares normal, septum midline, mucosa normal, no drainage    or sinus tenderness   Throat:   Lips, tongue, gums normal; oral mucosa pink and moist   Neck:   Supple, symmetrical, trachea midline, no adenopathy;     thyroid:  no enlargement/tenderness/nodules; no carotid    bruit or JVD   Back:     Symmetric, no curvature, ROM normal, no CVA tenderness   Lungs:     Clear to auscultation bilaterally, respirations unlabored   Chest Wall:    No tenderness or deformity    Heart:   S1-S2 regular   Abdomen:    Obese soft nontender   Extremities:   Extremities normal, atraumatic, no cyanosis or edema   Pulses:   Pulses palpable in all extremities; symmetric all extremities   Skin:   Skin color normal, Skin is warm and dry,  no rashes or palpable lesions   Neurologic:  Awake interactive but not oriented.       Data Review:      I reviewed the patient's new clinical results.  Results from last 7 days   Lab Units 09/08/22  1207   WBC 10*3/mm3 8.67   HEMOGLOBIN g/dL 12.8   PLATELETS 10*3/mm3 243     Results from last 7 days   Lab Units 09/08/22  1207   SODIUM mmol/L 138   POTASSIUM mmol/L 4.8   CHLORIDE mmol/L 101   CO2 mmol/L 30.4*   BUN " mg/dL 14   CREATININE mg/dL 0.90   CALCIUM mg/dL 10.0   GLUCOSE mg/dL 157*     Microbiology Results (last 10 days)     ** No results found for the last 240 hours. **        Brief Urine Lab Results  (Last result in the past 365 days)      Color   Clarity   Blood   Leuk Est   Nitrite   Protein   CREAT   Urine HCG        09/08/22 1310 Yellow   Cloudy   Moderate (2+)   Large (3+)   Negative   30 mg/dL (1+)                 Assessment:  Active Hospital Problems    Diagnosis  POA   • **UTI (urinary tract infection), uncomplicated [N39.0]  Yes   • Type 2 diabetes mellitus with hyperglycemia, with long-term current use of insulin (HCC) [E11.65, Z79.4]  Not Applicable   • History of right MCA stroke [Z86.73]  Not Applicable   • Cognitive communication deficit [R41.841]  Yes   • Acute UTI [N39.0]  Yes   • Hypertension [I10]  Yes   • DNR (do not resuscitate) [Z66]  Yes       Plan: See admitting orders  · Continue with vancomycin and cefepime while trying to get the actual culture report from nursing home  · Repeat urinalysis and urine culture   · Continue with Accu-Cheks and sliding scale coverage and watch for hypoglycemia  · Monitor for sundowning  · Watch for complications of antibiotics such as cytopenias, C. difficile and yeast infection, rash nausea vomiting and diarrhea and renal and hepatic dysfunction.    Mahesh Sands MD   9/8/2022  16:40 EDT  Much of this encounter note is an electronic transcription/translation of spoken language to printed text. The electronic translation of spoken language may permit erroneous, or at times, nonsensical words or phrases to be inadvertently transcribed; Although I have reviewed the note for such errors, some may still exist

## 2022-09-08 NOTE — ED NOTES
Nursing report ED to floor  Jolanta Phipps  87 y.o.  female    HPI :   Chief Complaint   Patient presents with   • Abnormal Lab       Admitting doctor:   Mahesh Sands MD    Admitting diagnosis:   The primary encounter diagnosis was UTI (urinary tract infection), uncomplicated. A diagnosis of Infection of drug-resistant bacteria was also pertinent to this visit.    Code status:   Current Code Status     Date Active Code Status Order ID Comments User Context       Prior    Advance Care Planning Activity          Allergies:   Aldactone [spironolactone], Atenolol, Atorvastatin, Cholestyramine, Ezetimibe, Hctz [hydrochlorothiazide], Lisinopril, Losartan, Lyrica [pregabalin], Metformin, Pork-derived products, Sulfa antibiotics, Tapentadol, and Valsartan    Intake and Output  No intake or output data in the 24 hours ending 09/08/22 1447    Weight:       09/08/22  1130   Weight: (!) 141 kg (310 lb)       Most recent vitals:   Vitals:    09/08/22 1200 09/08/22 1231 09/08/22 1259 09/08/22 1331   BP: 156/70 160/99  179/90   Pulse: 71 64 69 60   Resp: 16 16  18   Temp:       TempSrc:       SpO2: 96% 94% 96% 94%   Weight:           Active LDAs/IV Access:   Lines, Drains & Airways     Active LDAs     Name Placement date Placement time Site Days    Peripheral IV 09/08/22 1207 Right Antecubital 09/08/22  1207  Antecubital  less than 1                Labs (abnormal labs have a star):   Labs Reviewed   COMPREHENSIVE METABOLIC PANEL - Abnormal; Notable for the following components:       Result Value    Glucose 157 (*)     CO2 30.4 (*)     Albumin 3.40 (*)     All other components within normal limits    Narrative:     GFR Normal >60  Chronic Kidney Disease <60  Kidney Failure <15     CBC WITH AUTO DIFFERENTIAL - Abnormal; Notable for the following components:    Immature Grans % 0.6 (*)     All other components within normal limits   URINALYSIS W/ CULTURE IF INDICATED - Abnormal; Notable for the following components:    Appearance, UA  Cloudy (*)     Blood, UA Moderate (2+) (*)     Protein, UA 30 mg/dL (1+) (*)     Leuk Esterase, UA Large (3+) (*)     All other components within normal limits    Narrative:     In absence of clinical symptoms, the presence of pyuria, bacteria, and/or nitrites on the urinalysis result does not correlate with infection.   URINALYSIS, MICROSCOPIC ONLY - Abnormal; Notable for the following components:    RBC, UA 3-5 (*)     WBC, UA Too Numerous to Count (*)     All other components within normal limits   URINE CULTURE   BLOOD CULTURE   BLOOD CULTURE   RAINBOW DRAW    Narrative:     The following orders were created for panel order [place] Draw.  Procedure                               Abnormality         Status                     ---------                               -----------         ------                     Green Top (Gel)[[id number]]                                  Final result               Lavender Top[[id number]]                                     Final result               Gold Top - SST[[id number]]                                   Final result               Light Blue Top[[id number]]                                   Final result                 Please view results for these tests on the individual orders.   LACTIC ACID, PLASMA   CBC AND DIFFERENTIAL    Narrative:     The following orders were created for panel order CBC & Differential.  Procedure                               Abnormality         Status                     ---------                               -----------         ------                     CBC Auto Differential[[id number]]        Abnormal            Final result                 Please view results for these tests on the individual orders.   GREEN TOP   LAVENDER TOP   GOLD TOP - SST   LIGHT BLUE TOP       EKG:   No orders to display       Meds given in ED:   Medications   sodium chloride 0.9 % flush 10 mL (has no administration in time range)   vancomycin 2750 mg/1000 mL 0.9% NS IVPB (has  no administration in time range)   cefepime 2 gm IVPB in 100 ml NS (VTB) (has no administration in time range)       Imaging results:  XR Chest 1 View    Result Date: 9/8/2022  Minimal likely atelectasis or scarring at the left lateral costophrenic angle. Follow-up as clinical indications persist.  This report was finalized on 9/8/2022 1:11 PM by Dr. Donato Celis M.D.        Ambulatory status:   - bed bound    Social issues:   Social History     Socioeconomic History   • Marital status:    Tobacco Use   • Smoking status: Former Smoker   • Smokeless tobacco: Never Used       NIH Stroke Scale:        Nursing report ED to floor:

## 2022-09-08 NOTE — ED TRIAGE NOTES
Dx c uti at facility.  She cannot take oral abx per staff as they interact c tramadol and cymbalta.  She was snt to ER for IV abx    Patient was placed in face mask during first look triage.  Patient was wearing a face mask throughout encounter.  I wore personal protective equipment throughout the encounter.  Hand hygiene was performed before and after patient encounter.

## 2022-09-09 NOTE — PLAN OF CARE
Goal Outcome Evaluation:  Plan of Care Reviewed With: patient        Progress: no change  Outcome Evaluation: BP elevated, otherwise VSS, BG monitored, bilateral +3 edema to lower extremeties, turning q 2hrs, purwick and brief in place, falls precautions maintained, accumax to bed, c/o left elbow pain, PO Tylenol given.

## 2022-09-09 NOTE — DISCHARGE PLACEMENT REQUEST
"Jolanta Phipps (87 y.o. Female)             Date of Birth   1934    Social Security Number       [facility] [address]  [place] [address]    Home Phone   [phone]    MRN   [id number]       Judaism   Unknown    Marital Status                               Admission Date   9/8/22    Admission Type   Emergency    Admitting Provider   Mahesh Sands MD    Attending Provider   Efrain Lovett MD    Department, Room/Bed   [address], [address]       Discharge Date       Discharge Disposition       Discharge Destination                               Attending Provider: Efrain Lovett MD    Allergies: Aldactone [Spironolactone], Atenolol, Atorvastatin, Cholestyramine, Ezetimibe, Hctz [Hydrochlorothiazide], Lisinopril, Losartan, Lyrica [Pregabalin], Metformin, Pork-derived Products, Sulfa Antibiotics, Tapentadol, Valsartan    Isolation: None   Infection: None   Code Status: No CPR   Advance Care Planning Activity    Ht: 167.6 cm (66\")   Wt: 138 kg (303 lb 9.2 oz)    Admission Cmt: None   Principal Problem: UTI (urinary tract infection), uncomplicated [N39.0]                 Active Insurance as of 9/8/2022     Primary Coverage     Payor Plan Insurance Group Employer/Plan Group    MEDICARE MEDICARE A & B      Payor Plan Address Payor Plan Phone Number Payor Plan Fax Number Effective Dates    [address] [phone]  3/1/1995 - None Entered    [facility] [address]       Subscriber Name Subscriber Birth Date Member ID       JOLANTA PHIPPS 1934 [id number]           Secondary Coverage     Payor Plan Insurance Group Employer/Plan Group    [employer]P AAR HE[employer] CARE OPTIONS      Payor Plan Address Payor Plan Phone Number Payor Plan Fax Number Effective Dates    [facility] [phone]  1/1/2020 - None Entered    [address]       [facility] [address]       Subscriber Name Subscriber Birth Date Member ID       JOLANTA PHIPPS 1934 " [id number]                 Emergency Contacts      (Rel.) Home Phone Work Phone Mobile Phone    BRIAN(PODURAN),DINORA (Son) [phone] -- [phone]

## 2022-09-09 NOTE — PROGRESS NOTES
Name: Jolanta Phipps ADMIT: 2022   : 1934  PCP: Lizz Sanchez APRN    MRN: [id number] LOS: 0 days   AGE/SEX: 87 y.o. female  ROOM: [facility]     Subjective   Subjective   Patient is lying in the bed and appears better today.  She is oriented to person place and month.  Answering questions.  Denies nausea, vomiting, chest pain, shortness of breath.     Objective   Objective   Vital Signs  Temp:  [97 °F (36.1 °C)-97.4 °F (36.3 °C)] 97.3 °F (36.3 °C)  Heart Rate:  [63-74] 63  Resp:  [16-18] 18  BP: (139-176)/(63-72) 139/63  SpO2:  [93 %-96 %] 93 %  on   ;   Device (Oxygen Therapy): room air  Body mass index is 49 kg/m².  Physical Exam  Constitutional:       General: She is not in acute distress.  HENT:      Head: Normocephalic and atraumatic.      Nose: Nose normal.      Mouth/Throat:      Mouth: Mucous membranes are moist.   Eyes:      Extraocular Movements: Extraocular movements intact.      Conjunctiva/sclera: Conjunctivae normal.      Pupils: Pupils are equal, round, and reactive to light.   Cardiovascular:      Rate and Rhythm: Normal rate and regular rhythm.      Pulses: Normal pulses.      Heart sounds: Normal heart sounds.   Pulmonary:      Effort: Pulmonary effort is normal. No respiratory distress.      Breath sounds: Normal breath sounds.   Abdominal:      General: Bowel sounds are normal. There is no distension.      Palpations: Abdomen is soft.      Tenderness: There is no abdominal tenderness.   Musculoskeletal:      Cervical back: Normal range of motion and neck supple.      Right lower leg: No edema.      Left lower leg: No edema.   Skin:     General: Skin is warm and dry.      Coloration: Skin is not jaundiced.   Neurological:      General: No focal deficit present.      Mental Status: She is alert and oriented to person, place, and time.   Psychiatric:         Mood and Affect: Mood normal.         Behavior: Behavior normal.       Results Review     I reviewed the patient's new clinical  results.  Results from last 7 days   Lab Units 09/08/22  1207   WBC 10*3/mm3 8.67   HEMOGLOBIN g/dL 12.8   PLATELETS 10*3/mm3 243     Results from last 7 days   Lab Units 09/08/22  1207   SODIUM mmol/L 138   POTASSIUM mmol/L 4.8   CHLORIDE mmol/L 101   CO2 mmol/L 30.4*   BUN mg/dL 14   CREATININE mg/dL 0.90   GLUCOSE mg/dL 157*   EGFR mL/min/1.73 62.0     Results from last 7 days   Lab Units 09/08/22  1207   ALBUMIN g/dL 3.40*   BILIRUBIN mg/dL 0.4   ALK PHOS U/L 82   AST (SGOT) U/L 17   ALT (SGPT) U/L 15     Results from last 7 days   Lab Units 09/08/22  1207   CALCIUM mg/dL 10.0   ALBUMIN g/dL 3.40*     Results from last 7 days   Lab Units 09/08/22  1448   LACTATE mmol/L 1.3     Glucose   Date/Time Value Ref Range Status   09/09/2022 1100 223 (H) 70 - 130 mg/dL Final     Comment:     Meter: [id number] : [id number] Madie Avelar RN   09/09/2022 0618 230 (H) 70 - 130 mg/dL Final     Comment:     Meter: [id number] : [id number] Lashanda LOPEZ   09/08/2022 2024 293 (H) 70 - 130 mg/dL Final     Comment:     Meter: [id number] : [id number] Lashanda LOPEZ   09/08/2022 1806 186 (H) 70 - 130 mg/dL Final     Comment:     Meter: [id number] : [id number] Danny Kelly CNA       XR Chest 1 View    Result Date: 9/8/2022  Minimal likely atelectasis or scarring at the left lateral costophrenic angle. Follow-up as clinical indications persist.  This report was finalized on 9/8/2022 1:11 PM by Dr. Donato Celis M.D.      Scheduled Medications  amLODIPine, 10 mg, Oral, Daily  apixaban, 5 mg, Oral, BID  ARIPiprazole, 2 mg, Oral, Daily  cefepime, 2 g, Intravenous, Q12H  cholecalciferol, 2,000 Units, Oral, Daily  cloNIDine, 0.1 mg, Oral, BID  DULoxetine, 60 mg, Oral, Q12H  furosemide, 20 mg, Oral, Daily  insulin glargine, 30 Units, Subcutaneous, Nightly  insulin lispro, 0-9 Units, Subcutaneous, TID AC  levothyroxine, 125 mcg, Oral, Daily  linagliptin, 5 mg, Oral, Daily  polyethylene glycol, 17 g, Oral,  Daily  rosuvastatin, 20 mg, Oral, Nightly  traMADol, 50 mg, Oral, TID  vitamin B-12, 1,000 mcg, Oral, Daily    Infusions   Diet  Diet Regular; Consistent Carbohydrate       Assessment/Plan     Active Hospital Problems    Diagnosis  POA   • **UTI (urinary tract infection), uncomplicated [N39.0]  Yes   • Type 2 diabetes mellitus with hyperglycemia, with long-term current use of insulin (HCC) [E11.65, Z79.4]  Not Applicable   • History of right MCA stroke [Z86.73]  Not Applicable   • Cognitive communication deficit [R41.841]  Yes   • Acute UTI [N39.0]  Yes   • Hypertension [I10]  Yes   • DNR (do not resuscitate) [Z66]  Yes      Resolved Hospital Problems   No resolved problems to display.     #1 UTI, will await for final cultures and sensitivities and will continue with cefepime for the moment.  2.  Diabetes mellitus, currently on Lantus and corrective dose insulin.  Continue with linagliptin as well.  3.  Hypothyroidism, on Synthroid.  4.  Hyperlipidemia, on statins.  5.  Altered mental status, most likely secondary to UTI and her mental status has been improving.  6.  Hypertension, on amlodipine, clonidine and it will be continued and monitor blood pressure closely.  7.  History of stroke, on Eliquis and statins will be continued.  8.  Generalized weakness, PT/OT consult be obtained.  9.  On Eliquis, no need for Lovenox for DVT prophylaxis.  10.  CODE STATUS is DNR.        Efrain Lovett MD  [facility]  09/09/22  16:40 EDT

## 2022-09-09 NOTE — PROGRESS NOTES
Discharge Planning Assessment  [facility]     Patient Name: Jolanta Phipps  MRN: [id number]  Today's Date: 9/9/2022    Admit Date: 9/8/2022     Discharge Needs Assessment     Row Name 09/09/22 1315       Living Environment    People in Home facility resident    Current Living Arrangements assisted living facility;other (see comments)  PC level of care    Primary Care Provided by other (see comments)  Facility resident    Provides Primary Care For no one    Family Caregiver if Needed child(felicia), adult;other (see comments)  Facility resident    Quality of Family Relationships helpful;involved;supportive    Able to Return to Prior Arrangements yes       Resource/Environmental Concerns    Resource/Environmental Concerns none       Transition Planning    Patient/Family Anticipates Transition to long-term care facility  PC level of care/LTC    Transportation Anticipated health plan transportation       Discharge Needs Assessment    Equipment Currently Used at Home cpap    Provided Post Acute Provider List? N/A    N/A Provider List Comment Return to [place] PC level/LTC               Discharge Plan     Row Name 09/09/22 9053       Plan    Plan Return to [facility] level of care/LTC    Plan Comments TIRADO noted. Introduced self and role of CCP. Patient stated she lives a [place]. Stated she does not walk. Stated staff assist with all her ADL's. Uses Cpap at  and is a diabetic. Spoke to Nino at [place] [phone] who confirmed patient is a resident there and is able to retunr upon DC.. Has been there since 2020. Is in the PC level/LTC. Patient is bedbound and staff provides all ADL's. Patient is able to feed self and take medications provided. Will continue to follow for poss needs/equipment. Patient will need transportation scheduled at [place].              Continued Care and Services - Admitted Since 9/8/2022     Destination     Service Provider Request Status Selected Services Address Phone Fax Patient Preferred     [facility]  Accepted N/A [address], [facility] [id number] [phone] --                 Demographic Summary     Row Name 09/09/22 1314       General Information    Admission Type observation    Arrived From long-term care    Required Notices Provided Observation Status Notice    Preferred Language English               Functional Status     Row Name 09/09/22 1314       Functional Status    Usual Activity Tolerance poor    Current Activity Tolerance poor       Functional Status, IADL    Medications assistive person    Meal Preparation completely dependent    Housekeeping completely dependent    Laundry completely dependent    Shopping completely dependent       Employment/    Employment Status retired               Psychosocial     Row Name 09/09/22 1314       Values/Beliefs    Spiritual, Cultural Beliefs, Restorationist Practices, Values that Affect Care no       Behavior WDL    Behavior WDL WDL       Coping/Stress    Sources of Support adult child(felicia);other (see comments)  facility resident    Understanding of Condition and Treatment adequate understanding of treatment       Developmental Stage (Evangelistaikmaribellon's)    Developmental Stage Stage 8 (65 years-death/Late Adulthood) Integrity vs. Despair               Abuse/Neglect     Row Name 09/09/22 1315       Personal Safety    Feels Unsafe at Home or Work/School no    Feels Threatened by Someone no    Does Anyone Try to Keep You From Having Contact with Others or Doing Things Outside Your Home? no    Physical Signs of Abuse Present no                Lala Vines RN

## 2022-09-09 NOTE — SIGNIFICANT NOTE
09/09/22 0944   OTHER   Discipline physical therapist   Rehab Time/Intention   Session Not Performed other (see comments)  (PT eval order received. Pt is NH resident, uses nima lift for transfers at baseline, no indication for acute PT. spoke with SELIN Cabral. PT will sign off)

## 2022-09-10 NOTE — PLAN OF CARE
Goal Outcome Evaluation:  Plan of Care Reviewed With: patient        Progress: no change  Outcome Evaluation: Pt is [employer], left arm and both lower leg extremities with minimal use-hx of stroke, pt can be forgetful but is oriented, speech is garbled at times, bed alarm, accumax bed, Purewick, Monitoring blood sugars, Frequent turning throughout shift and cough and deep breathing encouraged throughout shift, foot drop and swelling in ankles and feet, family at bedside, ivf infusing at KVO rate, pt was tearful this morning complaining of pain in abdomen- PO tylenol given and pt is doing better.

## 2022-09-10 NOTE — SIGNIFICANT NOTE
"   09/10/22 0901   OTHER   Discipline physical therapist   Rehab Time/Intention   Session Not Performed other (see comments)  (Per previous physical therapy note from yesterday: \"Pt is NH resident, uses nima lift for transfers at baseline, no indication for acute PT.\" PT to sign off.)   Therapy Assessment/Plan (PT)   Criteria for Skilled Interventions Met (PT) does not meet criteria for skilled intervention     "

## 2022-09-10 NOTE — PROGRESS NOTES
Name: Jolanta Phipps ADMIT: 2022   : 1934  PCP: Lizz Sanchez APRN    MRN: [id number] LOS: 0 days   AGE/SEX: 87 y.o. female  ROOM: [facility]     Subjective   Subjective   Patient is lying in the bed and appears better today.  She is oriented to person place and month.   Denies nausea, vomiting, chest pain, shortness of breath.     Objective   Objective   Vital Signs  Temp:  [97 °F (36.1 °C)-97.6 °F (36.4 °C)] 97 °F (36.1 °C)  Heart Rate:  [63-78] 78  Resp:  [16-18] 16  BP: (139-161)/(63-82) 150/82  SpO2:  [92 %-97 %] 97 %  on   ;   Device (Oxygen Therapy): room air  Body mass index is 49 kg/m².  Physical Exam  Constitutional:       General: She is not in acute distress.  HENT:      Head: Normocephalic and atraumatic.      Nose: Nose normal.      Mouth/Throat:      Mouth: Mucous membranes are moist.   Eyes:      Extraocular Movements: Extraocular movements intact.      Conjunctiva/sclera: Conjunctivae normal.      Pupils: Pupils are equal, round, and reactive to light.   Cardiovascular:      Rate and Rhythm: Normal rate and regular rhythm.      Pulses: Normal pulses.      Heart sounds: Normal heart sounds.   Pulmonary:      Effort: Pulmonary effort is normal. No respiratory distress.      Breath sounds: Normal breath sounds.   Abdominal:      General: Bowel sounds are normal. There is no distension.      Palpations: Abdomen is soft.      Tenderness: There is no abdominal tenderness.   Musculoskeletal:      Cervical back: Normal range of motion and neck supple.      Right lower leg: No edema.      Left lower leg: No edema.   Skin:     General: Skin is warm and dry.      Coloration: Skin is not jaundiced.   Neurological:      General: No focal deficit present.      Mental Status: She is alert and oriented to person, place, and time.   Psychiatric:         Mood and Affect: Mood normal.         Behavior: Behavior normal.       Results Review     I reviewed the patient's new clinical results.  Results from  last 7 days   Lab Units 09/10/22  0601 09/08/22  1207   WBC 10*3/mm3 10.59 8.67   HEMOGLOBIN g/dL 12.5 12.8   PLATELETS 10*3/mm3 227 243     Results from last 7 days   Lab Units 09/10/22  0601 09/08/22  1207   SODIUM mmol/L 137 138   POTASSIUM mmol/L 4.7 4.8   CHLORIDE mmol/L 97* 101   CO2 mmol/L 29.3* 30.4*   BUN mg/dL 15 14   CREATININE mg/dL 0.59 0.90   GLUCOSE mg/dL 212* 157*   EGFR mL/min/1.73 87.4 62.0     Results from last 7 days   Lab Units 09/08/22  1207   ALBUMIN g/dL 3.40*   BILIRUBIN mg/dL 0.4   ALK PHOS U/L 82   AST (SGOT) U/L 17   ALT (SGPT) U/L 15     Results from last 7 days   Lab Units 09/10/22  0601 09/08/22  1207   CALCIUM mg/dL 10.1 10.0   ALBUMIN g/dL  --  3.40*     Results from last 7 days   Lab Units 09/08/22  1448   LACTATE mmol/L 1.3     Glucose   Date/Time Value Ref Range Status   09/10/2022 1103 247 (H) 70 - 130 mg/dL Final     Comment:     Meter: [id number] : [id number] Jaylon Meredith CNA   09/10/2022 0620 195 (H) 70 - 130 mg/dL Final     Comment:     RN Notified R and V Meter: [id number] : [id number] Gerard LOPEZ   09/09/2022 2139 224 (H) 70 - 130 mg/dL Final     Comment:     RN Notified R and V Meter: [id number] : [id number] Gerard LOPEZ   09/09/2022 1641 213 (H) 70 - 130 mg/dL Final     Comment:     Meter: [id number] : [id number] Madie Avelar RN   09/09/2022 1100 223 (H) 70 - 130 mg/dL Final     Comment:     Meter: [id number] : [id number] Madie Avelar RN   09/09/2022 0618 230 (H) 70 - 130 mg/dL Final     Comment:     Meter: [id number] : [id number] Lashanda LOPEZ   09/08/2022 2024 293 (H) 70 - 130 mg/dL Final     Comment:     Meter: [id number] : [id number] Lashanda LOPEZ       No radiology results for the last day  Scheduled Medications  amLODIPine, 10 mg, Oral, Daily  apixaban, 5 mg, Oral, BID  ARIPiprazole, 2 mg, Oral, Daily  cefepime, 2 g, Intravenous, Q12H  cholecalciferol, 2,000 Units, Oral, Daily  cloNIDine, 0.1 mg, Oral,  BID  DULoxetine, 60 mg, Oral, Q12H  furosemide, 20 mg, Oral, Daily  insulin glargine, 30 Units, Subcutaneous, Nightly  insulin lispro, 0-9 Units, Subcutaneous, TID AC  levothyroxine, 125 mcg, Oral, Daily  linagliptin, 5 mg, Oral, Daily  polyethylene glycol, 17 g, Oral, Daily  rosuvastatin, 20 mg, Oral, Nightly  traMADol, 50 mg, Oral, TID  vitamin B-12, 1,000 mcg, Oral, Daily    Infusions   Diet  Diet Regular; Consistent Carbohydrate       Assessment/Plan     Active Hospital Problems    Diagnosis  POA   • **UTI (urinary tract infection), uncomplicated [N39.0]  Yes   • Type 2 diabetes mellitus with hyperglycemia, with long-term current use of insulin (HCC) [E11.65, Z79.4]  Not Applicable   • History of right MCA stroke [Z86.73]  Not Applicable   • Cognitive communication deficit [R41.841]  Yes   • Acute UTI [N39.0]  Yes   • Hypertension [I10]  Yes   • DNR (do not resuscitate) [Z66]  Yes      Resolved Hospital Problems   No resolved problems to display.     #1 UTI, continue with cefepime and patient is improving clinically.  If she continues to do well discharge to rehab/nursing home tomorrow.  2.  Diabetes mellitus, will further advance Lantus to 40units and will continue with corrective dose insulin.  3.  Hypothyroidism, on Synthroid.  4.  Hyperlipidemia, on statins.  5.  Altered mental status, most likely secondary to UTI and her mental status has been improving.  6.  Hypertension, on amlodipine, clonidine and it will be continued and monitor blood pressure closely.  7.  History of stroke, on Eliquis and statins will be continued.  8.  Generalized weakness, PT/OT consult be obtained.  9.  On Eliquis, no need for Lovenox for DVT prophylaxis.  10.  CODE STATUS is DNR.        Efrain Lovett MD  [facility]  09/10/22  14:10 EDT

## 2022-09-10 NOTE — PLAN OF CARE
Goal Outcome Evaluation:  Plan of Care Reviewed With: patient        Progress: improving  Outcome Evaluation: Pt is oriented--forgetful. She has hx of stroke and speech is slightly garbled at times. She is [place]. Left arm with minimal use. Both lower extremities with minimal use. On bed alarm for safety. Bedrest. Purewik in place. Good u/o. Refuses to turn at times. Afebrile and VS stable--B/P slightly elevated at 161/82. Able to swallow without difficulty. IVFs at kvo rate for antibiotics. Monitoring blood sugars. Some swelling of ankles and feet

## 2022-09-10 NOTE — PLAN OF CARE
Goal Outcome Evaluation:  VSS, alert, oriented, forgetful, B/L ankle edema, foot drop, greater on right side, pure wick in use, sliding scale coverage for elevated  blood glucose, turned q 2 hrs, accumax in use, falls protocol maintained, bed alarm in use  Plan of Care Reviewed With: patient

## 2022-09-11 NOTE — DISCHARGE SUMMARY
Patient Name: Jolanta Phipps  : 1934  MRN: [id number]    Date of Admission: 2022  Date of Discharge:  2022  Primary Care Physician: Lizz Sanchez APRN      Chief Complaint:   Abnormal Lab      Discharge Diagnoses     Active Hospital Problems    Diagnosis  POA   • **UTI (urinary tract infection), uncomplicated [N39.0]  Yes   • Type 2 diabetes mellitus with hyperglycemia, with long-term current use of insulin (HCC) [E11.65, Z79.4]  Not Applicable   • History of right MCA stroke [Z86.73]  Not Applicable   • Cognitive communication deficit [R41.841]  Yes   • Acute UTI [N39.0]  Yes   • Hypertension [I10]  Yes   • DNR (do not resuscitate) [Z66]  Yes      Resolved Hospital Problems   No resolved problems to display.        Hospital Course     Patient is 87-year-old female who has complicated past medical history as listed below who is a resident of nursing home and has had prior history of urinary tract infection was having symptoms of burning urination frequency and urgency and was tested for UTI and was found to have apparently drug-resistant pathogens for which oral therapy was not available without causing adverse interaction.  The details of the culture results are not available but apparently patient was recommended to take fosfomycin and linezolid at the facility for the pathogen found in the urine culture.  Patient was otherwise feeling fine except for mild confusion and disorientation.  In the emergency room patient was started on vancomycin and cefepime and is being admitted for further care.  She did have abnormal urinalysis.  Upon review of records and discussion with ER provider it appears that the urine culture was positive for Pseudomonas staph Proteus and Enterococcus species.      1. UTI,  was treated with IV cefepime during this hospital stay and has shown significant improvement clinically.  Although cultures did not reveal any growth will continue antibiotics to complete a  course.  2.  Diabetes mellitus,  will resume her home dose insulin and recommend a corrective dose as well if possible at deciding facility.  3.  Hypothyroidism, on Synthroid.  4.  Hyperlipidemia, on statins.  5.  Altered mental status, felt to be secondary to UTI and her mental status has significantly improved and is back to baseline.  6.  Hypertension, on amlodipine, clonidine and it will be continued and monitor blood pressure closely.  7.  History of stroke, on Eliquis and statins will be continued.  8.  Chronic right femur fracture, being treated nonoperatively at this point and does have an orthopedist that she follows with.  She is nonweightbearing at this point.        Day of Discharge         Physical Exam:  Temp:  [97.1 °F (36.2 °C)-98.3 °F (36.8 °C)] 97.6 °F (36.4 °C)  Heart Rate:  [70-76] 74  Resp:  [16] 16  BP: (123-151)/(65-83) 123/65  Body mass index is 49 kg/m².  Physical Exam    Constitutional:       General: She is not in acute distress.  HENT:      Head: Normocephalic and atraumatic.      Nose: Nose normal.      Mouth/Throat:      Mouth: Mucous membranes are moist.   Eyes:      Extraocular Movements: Extraocular movements intact.      Conjunctiva/sclera: Conjunctivae normal.      Pupils: Pupils are equal, round, and reactive to light.   Cardiovascular:      Rate and Rhythm: Normal rate and regular rhythm.      Pulses: Normal pulses.      Heart sounds: Normal heart sounds.   Pulmonary:      Effort: Pulmonary effort is normal. No respiratory distress.      Breath sounds: Normal breath sounds.   Abdominal:      General: Bowel sounds are normal. There is no distension.      Palpations: Abdomen is soft.      Tenderness: There is no abdominal tenderness.   Musculoskeletal:      Cervical back: Normal range of motion and neck supple.      Right lower leg: Trace edema.      Left lower leg: Trace edema.   Skin:     General: Skin is warm and dry.      Coloration: Skin is not jaundiced.   Neurological:       General: Chronic lower extremity weakness bilaterally     Mental Status: She is alert and oriented to person, place, and time.   Psychiatric:         Mood and Affect: Mood normal.         Behavior: Behavior normal.          Consultants     Consult Orders (all) (From admission, onward)     Start     Ordered    09/08/22 1718  Inpatient Case Management  Consult  Once        Provider:  (Not yet assigned)    09/08/22 1718    09/08/22 1354  [facility] (on-call MD unless specified) Details  Once        Specialty:  Hospitalist  Provider:  (Not yet assigned)    09/08/22 1353              Procedures     * Surgery not found *      Imaging Results (All)     Procedure Component Value Units Date/Time    XR Chest 1 View [[id number]] Collected: 09/08/22 1305     Updated: 09/08/22 1314    Narrative:      XR CHEST 1 VW-     HISTORY: Female who is 87 years-old,  cough     TECHNIQUE: Frontal view of the chest     COMPARISON: 12/8/2021     FINDINGS: The heart size is normal. Aorta is calcified. Pulmonary  vasculature is unremarkable. Minimal likely atelectasis or scarring at  the left lateral costophrenic angle. No focal pulmonary consolidation,  pleural effusion, or pneumothorax. No acute osseous process.       Impression:      Minimal likely atelectasis or scarring at the left lateral  costophrenic angle. Follow-up as clinical indications persist.     This report was finalized on 9/8/2022 1:11 PM by Dr. Donato Celis M.D.           Results for orders placed during the hospital encounter of 12/08/21    Duplex Venous Lower Extremity - Right CAR    Interpretation Summary  · Normal right lower extremity venous duplex scan.      Pertinent Labs     Results from last 7 days   Lab Units 09/11/22  0424 09/10/22  0601 09/08/22  1207   WBC 10*3/mm3 8.81 10.59 8.67   HEMOGLOBIN g/dL 12.3 12.5 12.8   PLATELETS 10*3/mm3 221 227 243     Results from last 7 days   Lab Units 09/11/22  0424 09/10/22  0601 09/08/22  1207   SODIUM mmol/L  133* 137 138   POTASSIUM mmol/L 4.3 4.7 4.8   CHLORIDE mmol/L 100 97* 101   CO2 mmol/L 27.3 29.3* 30.4*   BUN mg/dL 19 15 14   CREATININE mg/dL 0.67 0.59 0.90   GLUCOSE mg/dL 224* 212* 157*   EGFR mL/min/1.73 84.7 87.4 62.0     Results from last 7 days   Lab Units 09/08/22  1207   ALBUMIN g/dL 3.40*   BILIRUBIN mg/dL 0.4   ALK PHOS U/L 82   AST (SGOT) U/L 17   ALT (SGPT) U/L 15     Results from last 7 days   Lab Units 09/11/22  0424 09/10/22  0601 09/08/22  1207   CALCIUM mg/dL 9.8 10.1 10.0   ALBUMIN g/dL  --   --  3.40*               Invalid input(s): LDLCALC  Results from last 7 days   Lab Units 09/08/22  1448 09/08/22  1434 09/08/22  1310   BLOODCX  No growth at 2 days No growth at 2 days  --    URINECX   --   --  <25,000 CFU/mL Normal Urogenital Joanie         Test Results Pending at Discharge     Pending Labs     Order Current Status    Blood Culture - Blood, Arm, Left Preliminary result    Blood Culture - Blood, Hand, Right Preliminary result          Discharge Details        Discharge Medications      New Medications      Instructions Start Date   cefdinir 300 MG capsule  Commonly known as: OMNICEF   300 mg, Oral, 2 Times Daily         Changes to Medications      Instructions Start Date   insulin glargine 100 UNIT/ML injection  Commonly known as: LANTUS, SEMGLEE  What changed: how much to take   30 Units, Subcutaneous, 2 Times Daily      insulin lispro 100 UNIT/ML injection  Commonly known as: ADMELOG  What changed:   · how much to take  · additional instructions   0-14 Units, Subcutaneous, 3 Times Daily Before Meals         Continue These Medications      Instructions Start Date   acetaminophen 650 MG 8 hr tablet  Commonly known as: TYLENOL   650 mg, Oral, 3 Times Daily      amLODIPine 10 MG tablet  Commonly known as: NORVASC   10 mg, Oral, Daily      apixaban 5 MG tablet tablet  Commonly known as: ELIQUIS   5 mg, Oral, 2 Times Daily      ARIPiprazole 2 MG tablet  Commonly known as: ABILIFY   2 mg, Oral,  Daily      bisacodyl 10 MG suppository  Commonly known as: DULCOLAX   10 mg, Rectal, Daily PRN      cloNIDine 0.1 MG tablet  Commonly known as: CATAPRES   0.1 mg, Oral, 2 Times Daily      Diclofenac Sodium 1 % gel gel  Commonly known as: VOLTAREN   4 g, Topical, 2 Times Daily      DULoxetine HCl 60 MG capsule  Commonly known as: DRIZALMA   60 mg, Oral, 2 Times Daily      furosemide 20 MG tablet  Commonly known as: LASIX   20 mg, Oral, Daily      levothyroxine 125 MCG tablet  Commonly known as: SYNTHROID, LEVOTHROID   125 mcg, Oral, Daily      linagliptin 5 MG tablet tablet  Commonly known as: TRADJENTA   5 mg, Oral, Daily      muscle rub 10-15 % cream cream   1 application, Topical, Every 4 Hours PRN, Apply to Left Shoulder       ondansetron 4 MG tablet  Commonly known as: ZOFRAN   4 mg, Oral, Every 8 Hours PRN      polyethylene glycol 17 g packet  Commonly known as: MIRALAX   17 g, Oral, Daily      rosuvastatin 20 MG tablet  Commonly known as: CRESTOR   20 mg, Oral, Nightly      traMADol 50 MG tablet  Commonly known as: ULTRAM   50 mg, Oral, 3 Times Daily      Trulicity 0.75 MG/0.5ML solution pen-injector  Generic drug: Dulaglutide   0.75 mg, Subcutaneous, Weekly, Tuesday      vitamin B-12 1000 MCG tablet  Commonly known as: CYANOCOBALAMIN   1,000 mcg, Oral, Daily      Vitamin D3 1.25 MG (80707 UT) tablet   5,000 Units, Oral, Daily         Stop These Medications    linezolid 600 MG tablet  Commonly known as: ZYVOX     methylPREDNISolone 4 MG tablet  Commonly known as: MEDROL            Allergies   Allergen Reactions   • Aldactone [Spironolactone] Unknown - Low Severity   • Atenolol Unknown - Low Severity   • Atorvastatin Unknown - Low Severity   • Cholestyramine Unknown - Low Severity   • Ezetimibe Unknown - Low Severity   • Hctz [Hydrochlorothiazide] Unknown - Low Severity   • Lisinopril Unknown - Low Severity   • Losartan Unknown - Low Severity   • Lyrica [Pregabalin] Unknown - Low Severity   • Metformin Unknown  - Low Severity   • Pork-Derived Products GI Intolerance   • Sulfa Antibiotics Unknown - Low Severity   • Tapentadol Unknown - Low Severity   • Valsartan Unknown - Low Severity       Discharge Disposition:  Home or Self Care      Discharge Diet:  Diet Order   Procedures   • Diet Regular; Consistent Carbohydrate       Discharge Activity:   Activity Instructions     Activity as Tolerated            CODE STATUS:    Code Status and Medical Interventions:   Ordered at: 09/09/22 0553     Medical Intervention Limits:    NO intubation (DNI)     Code Status (Patient has no pulse and is not breathing):    No CPR (Do Not Attempt to Resuscitate)     Medical Interventions (Patient has pulse or is breathing):    Limited Support     Release to patient:    Routine Release       No future appointments.  Additional Instructions for the Follow-ups that You Need to Schedule     Discharge Follow-up with PCP   As directed       Currently Documented PCP:    Lizz Sanchez APRN    PCP Phone Number:    [phone]     Follow Up Details: 2 weeks            Follow-up Information     Lizz Sanchez APRN .    Specialty: Nurse Practitioner  Why: 2 weeks  Contact information:  [address] [facility]  [address] [place] [address]  [phone]                         Additional Instructions for the Follow-ups that You Need to Schedule     Discharge Follow-up with PCP   As directed       Currently Documented PCP:    Lizz Sanchez APRN    PCP Phone Number:    [phone]     Follow Up Details: 2 weeks           Time Spent on Discharge:  Greater than 30 minutes      Efrain Lovett MD  [facility]  09/11/22  12:02 EDT

## 2022-09-11 NOTE — CASE MANAGEMENT/SOCIAL WORK
Continued Stay Note  [facility]     Patient Name: Jolanta Phipps  MRN: [id number]  Today's Date: 9/11/2022    Admit Date: 9/8/2022     Discharge Plan     Row Name 09/11/22 1259       Plan    Plan Comments CCP spoke w/ [facility], d/t pts size unable to transport until tommorow when they have 2 crews. [facility] scheduled for 9/12 @ 1300. CCP spoke to pt's nurse Heather and relayed the information.               Discharge Codes    No documentation.               Expected Discharge Date and Time     Expected Discharge Date Expected Discharge Time    Sep 11, 2022             Radha Smith RN

## 2022-09-11 NOTE — PLAN OF CARE
Goal Outcome Evaluation:  Plan of Care Reviewed With: patient        Progress: improving  Outcome Evaluation: Afebrile and VS stable. Bed alarm for safety. Purewick for incontinence. Continue to monitor blood sugars. Swelling of ankles and feet. Continues on cefepime per orders. Left arm and both legs very weak. Hx of stroke with left side paralysis.

## 2022-09-12 NOTE — PLAN OF CARE
Goal Outcome Evaluation:  Plan of Care Reviewed With: patient        Progress: improving  Outcome Evaluation: Pt excited to go back to her facility. turning q2. keisha breakfast. brief on, inc urine. waffle boots on. BLE elevated

## 2022-09-12 NOTE — DISCHARGE SUMMARY
Patient Name: Jolanta Phipps  : 1934  MRN: [id number]    Date of Admission: 2022  Date of Discharge:  2022  Primary Care Physician: Lizz Sanchez APRN      Chief Complaint:   Abnormal Lab      Discharge Diagnoses     Active Hospital Problems    Diagnosis  POA   • **UTI (urinary tract infection), uncomplicated [N39.0]  Yes   • Type 2 diabetes mellitus with hyperglycemia, with long-term current use of insulin (HCC) [E11.65, Z79.4]  Not Applicable   • History of right MCA stroke [Z86.73]  Not Applicable   • Cognitive communication deficit [R41.841]  Yes   • Acute UTI [N39.0]  Yes   • Hypertension [I10]  Yes   • DNR (do not resuscitate) [Z66]  Yes      Resolved Hospital Problems   No resolved problems to display.        Hospital Course     Patient is 87-year-old female who has complicated past medical history as listed below who is a resident of nursing home and has had prior history of urinary tract infection was having symptoms of burning urination frequency and urgency and was tested for UTI and was found to have apparently drug-resistant pathogens for which oral therapy was not available without causing adverse interaction.  The details of the culture results are not available but apparently patient was recommended to take fosfomycin and linezolid at the facility for the pathogen found in the urine culture.  Patient was otherwise feeling fine except for mild confusion and disorientation.  In the emergency room patient was started on vancomycin and cefepime and is being admitted for further care.  She did have abnormal urinalysis.  Upon review of records and discussion with ER provider it appears that the urine culture was positive for Pseudomonas staph Proteus and Enterococcus species.        1. UTI,  was treated with IV cefepime during this hospital stay and has shown significant improvement clinically.  Although cultures did not reveal any growth will continue antibiotics to complete a  course.  2.  Diabetes mellitus,  will resume her home dose insulin and recommend a corrective dose as well if possible at deciding facility.  3.  Hypothyroidism, on Synthroid.  4.  Hyperlipidemia, on statins.  5.  Altered mental status, felt to be secondary to UTI and her mental status has significantly improved and is back to baseline.  6.  Hypertension, on amlodipine, clonidine and it will be continued and monitor blood pressure closely.  7.  History of stroke, on Eliquis and statins will be continued.  8.  Chronic right femur fracture, being treated nonoperatively at this point and does have an orthopedist that she follows with.  She is nonweightbearing at this point.           Day of Discharge         Physical Exam:  Temp:  [97.1 °F (36.2 °C)-97.6 °F (36.4 °C)] 97.1 °F (36.2 °C)  Heart Rate:  [67-76] 70  Resp:  [16] 16  BP: (134-149)/(69-82) 137/69  Body mass index is 49 kg/m².  Physical Exam    Constitutional:       General: She is not in acute distress.  HENT:      Head: Normocephalic and atraumatic.      Nose: Nose normal.      Mouth/Throat:      Mouth: Mucous membranes are moist.   Eyes:      Extraocular Movements: Extraocular movements intact.      Conjunctiva/sclera: Conjunctivae normal.      Pupils: Pupils are equal, round, and reactive to light.   Cardiovascular:      Rate and Rhythm: Normal rate and regular rhythm.      Pulses: Normal pulses.      Heart sounds: Normal heart sounds.   Pulmonary:      Effort: Pulmonary effort is normal. No respiratory distress.      Breath sounds: Normal breath sounds.   Abdominal:      General: Bowel sounds are normal. There is no distension.      Palpations: Abdomen is soft.      Tenderness: There is no abdominal tenderness.   Musculoskeletal:      Cervical back: Normal range of motion and neck supple.      Right lower leg: Trace edema.      Left lower leg: Trace edema.   Skin:     General: Skin is warm and dry.      Coloration: Skin is not jaundiced.   Neurological:       General: Chronic lower extremity weakness bilaterally     Mental Status: She is alert and oriented to person, place, and time.   Psychiatric:         Mood and Affect: Mood normal.         Behavior: Behavior normal.       Consultants     Consult Orders (all) (From admission, onward)     Start     Ordered    09/08/22 1718  Inpatient Case Management  Consult  Once        Provider:  (Not yet assigned)    09/08/22 1718    09/08/22 1354  [facility] (on-call MD unless specified) Details  Once        Specialty:  Hospitalist  Provider:  (Not yet assigned)    09/08/22 1353              Procedures     * Surgery not found *      Imaging Results (All)     Procedure Component Value Units Date/Time    XR Chest 1 View [[id number]] Collected: 09/08/22 1305     Updated: 09/08/22 1314    Narrative:      XR CHEST 1 VW-     HISTORY: Female who is 87 years-old,  cough     TECHNIQUE: Frontal view of the chest     COMPARISON: 12/8/2021     FINDINGS: The heart size is normal. Aorta is calcified. Pulmonary  vasculature is unremarkable. Minimal likely atelectasis or scarring at  the left lateral costophrenic angle. No focal pulmonary consolidation,  pleural effusion, or pneumothorax. No acute osseous process.       Impression:      Minimal likely atelectasis or scarring at the left lateral  costophrenic angle. Follow-up as clinical indications persist.     This report was finalized on 9/8/2022 1:11 PM by Dr. Donato Celis M.D.           Results for orders placed during the hospital encounter of 12/08/21    Duplex Venous Lower Extremity - Right CAR    Interpretation Summary  · Normal right lower extremity venous duplex scan.      Pertinent Labs     Results from last 7 days   Lab Units 09/12/22  0513 09/11/22  0424 09/10/22  0601 09/08/22  1207   WBC 10*3/mm3 7.88 8.81 10.59 8.67   HEMOGLOBIN g/dL 11.4* 12.3 12.5 12.8   PLATELETS 10*3/mm3 199 221 227 243     Results from last 7 days   Lab Units 09/12/22 0513 09/11/22  0424  09/10/22  0601 09/08/22  1207   SODIUM mmol/L 135* 133* 137 138   POTASSIUM mmol/L 4.4 4.3 4.7 4.8   CHLORIDE mmol/L 103 100 97* 101   CO2 mmol/L 25.4 27.3 29.3* 30.4*   BUN mg/dL 18 19 15 14   CREATININE mg/dL 0.69 0.67 0.59 0.90   GLUCOSE mg/dL 200* 224* 212* 157*   EGFR mL/min/1.73 84.1 84.7 87.4 62.0     Results from last 7 days   Lab Units 09/08/22  1207   ALBUMIN g/dL 3.40*   BILIRUBIN mg/dL 0.4   ALK PHOS U/L 82   AST (SGOT) U/L 17   ALT (SGPT) U/L 15     Results from last 7 days   Lab Units 09/12/22  0513 09/11/22  0424 09/10/22  0601 09/08/22  1207   CALCIUM mg/dL 9.7 9.8 10.1 10.0   ALBUMIN g/dL  --   --   --  3.40*               Invalid input(s): LDLCALC  Results from last 7 days   Lab Units 09/08/22  1448 09/08/22  1434 09/08/22  1310   BLOODCX  No growth at 3 days No growth at 3 days  --    URINECX   --   --  <25,000 CFU/mL Normal Urogenital Joanie         Test Results Pending at Discharge     Pending Labs     Order Current Status    Blood Culture - Blood, Arm, Left Preliminary result    Blood Culture - Blood, Hand, Right Preliminary result          Discharge Details        Discharge Medications      New Medications      Instructions Start Date   cefdinir 300 MG capsule  Commonly known as: OMNICEF   300 mg, Oral, 2 Times Daily         Changes to Medications      Instructions Start Date   insulin lispro 100 UNIT/ML injection  Commonly known as: ADMELOG  What changed:   · how much to take  · additional instructions   0-14 Units, Subcutaneous, 3 Times Daily Before Meals         Continue These Medications      Instructions Start Date   acetaminophen 650 MG 8 hr tablet  Commonly known as: TYLENOL   650 mg, Oral, 3 Times Daily      amLODIPine 10 MG tablet  Commonly known as: NORVASC   10 mg, Oral, Daily      apixaban 5 MG tablet tablet  Commonly known as: ELIQUIS   5 mg, Oral, 2 Times Daily      ARIPiprazole 2 MG tablet  Commonly known as: ABILIFY   2 mg, Oral, Daily      bisacodyl 10 MG suppository  Commonly  known as: DULCOLAX   10 mg, Rectal, Daily PRN      cloNIDine 0.1 MG tablet  Commonly known as: CATAPRES   0.1 mg, Oral, 2 Times Daily      Diclofenac Sodium 1 % gel gel  Commonly known as: VOLTAREN   4 g, Topical, 2 Times Daily      DULoxetine HCl 60 MG capsule  Commonly known as: DRIZALMA   60 mg, Oral, 2 Times Daily      furosemide 20 MG tablet  Commonly known as: LASIX   20 mg, Oral, Daily      levothyroxine 125 MCG tablet  Commonly known as: SYNTHROID, LEVOTHROID   125 mcg, Oral, Daily      linagliptin 5 MG tablet tablet  Commonly known as: TRADJENTA   5 mg, Oral, Daily      muscle rub 10-15 % cream cream   1 application, Topical, Every 4 Hours PRN, Apply to Left Shoulder       ondansetron 4 MG tablet  Commonly known as: ZOFRAN   4 mg, Oral, Every 8 Hours PRN      polyethylene glycol 17 g packet  Commonly known as: MIRALAX   17 g, Oral, Daily      rosuvastatin 20 MG tablet  Commonly known as: CRESTOR   20 mg, Oral, Nightly      traMADol 50 MG tablet  Commonly known as: ULTRAM   50 mg, Oral, 3 Times Daily      Trulicity 0.75 MG/0.5ML solution pen-injector  Generic drug: Dulaglutide   0.75 mg, Subcutaneous, Weekly, Tuesday      vitamin B-12 1000 MCG tablet  Commonly known as: CYANOCOBALAMIN   1,000 mcg, Oral, Daily      Vitamin D3 1.25 MG (31278 UT) tablet   5,000 Units, Oral, Daily         Stop These Medications    linezolid 600 MG tablet  Commonly known as: ZYVOX     methylPREDNISolone 4 MG tablet  Commonly known as: MEDROL        ASK your doctor about these medications      Instructions Start Date   insulin glargine 100 UNIT/ML injection  Commonly known as: MONTY BERNARD  Ask about: Which instructions should I use?   38 Units, Subcutaneous, Daily             Allergies   Allergen Reactions   • Aldactone [Spironolactone] Unknown - Low Severity   • Atenolol Unknown - Low Severity   • Atorvastatin Unknown - Low Severity   • Cholestyramine Unknown - Low Severity   • Ezetimibe Unknown - Low Severity   • Hctz  [Hydrochlorothiazide] Unknown - Low Severity   • Lisinopril Unknown - Low Severity   • Losartan Unknown - Low Severity   • Lyrica [Pregabalin] Unknown - Low Severity   • Metformin Unknown - Low Severity   • Pork-Derived Products GI Intolerance   • Sulfa Antibiotics Unknown - Low Severity   • Tapentadol Unknown - Low Severity   • Valsartan Unknown - Low Severity       Discharge Disposition:  Skilled Nursing Facility (DC - External)      Discharge Diet:  Diet Order   Procedures   • Diet Regular; Consistent Carbohydrate       Discharge Activity:   Activity Instructions     Activity as Tolerated            CODE STATUS:    Code Status and Medical Interventions:   Ordered at: 09/09/22 0553     Medical Intervention Limits:    NO intubation (DNI)     Code Status (Patient has no pulse and is not breathing):    No CPR (Do Not Attempt to Resuscitate)     Medical Interventions (Patient has pulse or is breathing):    Limited Support     Release to patient:    Routine Release       No future appointments.  Additional Instructions for the Follow-ups that You Need to Schedule     Discharge Follow-up with PCP   As directed       Currently Documented PCP:    Lizz Sanchez APRN    PCP Phone Number:    [phone]     Follow Up Details: 2 weeks            Follow-up Information     Lizz Sanchez APRN .    Specialty: Nurse Practitioner  Why: 2 weeks  Contact information:  [address]  [address] [place] [address]  [phone]                         Additional Instructions for the Follow-ups that You Need to Schedule     Discharge Follow-up with PCP   As directed       Currently Documented PCP:    Lizz Sanchez APRN    PCP Phone Number:    [phone]     Follow Up Details: 2 weeks           Time Spent on Discharge:  Greater than 30 minutes      Efrain Lovett MD  [facility]  09/12/22  11:47 EDT

## 2022-09-12 NOTE — PROGRESS NOTES
Case Management Discharge Note      Final Note: Discharged back to [facility]/LTC    Provided Post Acute Provider List?: N/A  N/A Provider List Comment: Return to [facility] level/LTC    Transportation Services  Ambulance: [facility]    Final Discharge Disposition Code: 04 - intermediate care facility

## 2022-09-12 NOTE — PLAN OF CARE
Goal Outcome Evaluation:  Plan of Care Reviewed With: patient        Progress: no change  Outcome Evaluation: VSS, up in chair x1 today with lift, bed alarm on for safety, left sides weakness and limited mobility of legs, forgetful at times, foot drop and swelling in legs (waffle boots on), blood sugar checks and treated with insulin, cough and deep breathing encourged throughout shift, Purewick

## 2022-09-12 NOTE — PROGRESS NOTES
Continued Stay Note  [facility]     Patient Name: Jolanta Phipps  MRN: [id number]  Today's Date: 9/12/2022    Admit Date: 9/8/2022     Discharge Plan     Row Name 09/12/22 1039       Plan    Plan Return to [facility]    Plan Comments Spoke to Fariha with [facility] who confirmed patient is able to return to [facility] today. Updated her on tme of trnasportation. Requested report to be called to [phone]. Updated RN.  left for sonMiguel at [phone].    Row Name 09/12/22 1016       Plan    Plan Return to [facility]  level of care/LTC    Plan Comments Per Rosi with [facility], time changed to 1100 from 1300. Called [facility] at [phone].               Discharge Codes    No documentation.               Expected Discharge Date and Time     Expected Discharge Date Expected Discharge Time    Sep 12, 2022             Lala Vines, RN

## 2022-09-12 NOTE — PLAN OF CARE
Goal Outcome Evaluation:  Plan of Care Reviewed With: patient        Progress: improving  Outcome Evaluation: Pt excited to go back to her facility. turning q2. keisha breakfast. brief on, inc urine. waffle boots on. BLE elevated EMS planned for 1100

## 2022-09-12 NOTE — PLAN OF CARE
Goal Outcome Evaluation:  Plan of Care Reviewed With: patient        Progress: improving  Outcome Evaluation: Pt is technically discharged and was needing to stay for transportation reasons. She is afebrile and VS stable. continue to monitor blood sugars which are still a little elevated with blood sugar 309 at HS. Lantus was given at that time--will reassess this am. Swelling continues of both lower ext and feet. Waffle boots on. Purewick in place for incontinence. Pt will be discharged to [facility] today

## 2022-09-12 NOTE — PROGRESS NOTES
"Physicians Statement of Medical Necessity for  Ambulance Transportation    GENERAL INFORMATION     Name: Jolanta Phipps  YOB: 1934  Medicare #: [id number]  Transport Date: 9/12/2022 (Valid for round trips this date, or for scheduled repetitive trips for 60 days from the date signed below.)  Origin: [facility]  Destination: [facility]  Is the Patient's stay covered under Medicare Part A (PPS/DRG?)Yes  Closest appropriate facility? Yes  If this a hosp-hosp transfer? No  Is this a hospice patient? No    MEDICAL NECESSITY QUESTIONAIRE    Ambulance Transportation is medically necessary only if other means of transportation are contraindicated or would be potentially harmful to the patient.  To meet this requirement, the patient must be either \"bed confined\" or suffer from a condition such that transport by means other than an ambulance is contraindicated by the patient's condition.  The following questions must be answered by the healthcare professional signing below for this form to be valid:     1) Describe the MEDICAL CONDITION (physical and/or mental) of this patient AT THE TIME OF AMBULANCE TRANSPORT that requires the patient to be transported in an ambulance, and why transport by other means is contraindicated by the patient's condition:   Past Medical History:   Diagnosis Date   • CHF (congestive heart failure) (HCC)    • CKD (chronic kidney disease)    • Depression    • Diabetes mellitus (HCC)    • Fibromyalgia    • Hyperlipidemia    • Hypertension    • Hypothyroidism    • Stroke (HCC)       No past surgical history on file.   2) Is this patient \"bed confined\" as defined below?Yes   To be \"bed confined\" the patient must satisfy all three of the following criteria:  (1) unable to get up from bed without assistance; AND (2) unable to ambulate;  AND (3) unable to sit in a chair or wheelchair.  3) Can this patient safely be transported by car or wheelchair van (I.e., may " safely sit during transport, without an attendant or monitoring?)No   4. In addition to completing questions 1-3 above, please check any of the following conditions that apply*:          *Note: supporting documentation for any boxes checked must be maintained in the patient's medical records Danger to self/other, Unable to sit in a chair or wheelchair due to decubitus ulcers or other wounds and Morbid obesity requires additional personnel/equipment to safely handle patient      SIGNATURE OF PHYSICIAN OR OTHER AUTHORIZED HEALTHCARE PROFESSIONAL    I certify that the above information is true and correct based on my evaluation of this patient, and represent that the patient requires transport by ambulance and that other forms of transport are contraindicated.  I understand that this information will be used by the Centers for Medicare and Medicaid Services (CMS) to support the determiniation of medical necessity for ambulance services, and I represent that I have personal knowledge of the patient's condition at the time of transport.       If this box is checked, I also certify that the patient is physically or mentally incapable of signing the ambulance service's claim form and that the institution with which I am affiliated has furnished care, services or assistance to the patient.  My signature below is made on behalf of the patient pursuant to 42 .36(b)(4). In accordance with 42 .37, the specific reason(s) that the patient is physically or mentally incapable of signing the claim for is as follows:     Signature of Physician or Healthcare Professional  Date/Time:   9/12/2022 at 1000     (For Scheduled repetitive transport, this form is not valid for transports performed more than 60 days after this date).                                                                                                                                             --------------------------------------------------------------------------------------------  Printed Name and Credentials of Physician or Authorized Healthcare Professional     *Form must be signed by patient's attending physician for scheduled, repetitive transports,.  For non-repetitive ambulance transports, if unable to obtain the signature of the attending physician, any of the following may sign (please select below):     Physician  Clinical Nurse Specialist  Registered Nurse     Physician Assistant X Discharge Planner  Licensed Practical Nurse     Nurse Practitioner X

## 2022-12-02 PROBLEM — E87.70 HYPERVOLEMIA, UNSPECIFIED HYPERVOLEMIA TYPE: Status: ACTIVE | Noted: 2022-01-01

## 2022-12-02 PROBLEM — J18.9 PNEUMONIA DUE TO INFECTIOUS ORGANISM: Status: ACTIVE | Noted: 2022-01-01

## 2022-12-02 PROBLEM — J96.01 ACUTE RESPIRATORY FAILURE WITH HYPOXIA (HCC): Status: ACTIVE | Noted: 2022-01-01

## 2022-12-02 NOTE — CASE MANAGEMENT/SOCIAL WORK
Discharge Planning Assessment  [facility]     Patient Name: Jolanta Phipps  MRN: [id number]  Today's Date: 12/2/2022    Admit Date: 12/2/2022    Plan: Return to [facility] (basically [facility] level of care)   Discharge Needs Assessment     Row Name 12/02/22 1609       Living Environment    People in Home other (see comments)    Current Living Arrangements extended care facility    Provides Primary Care For other (see comments)    Quality of Family Relationships involved;helpful    Able to Return to Prior Arrangements yes    Living Arrangement Comments [facility]       Resource/Environmental Concerns    Resource/Environmental Concerns none       Transition Planning    Patient/Family Anticipates Transition to long-term care facility    Patient/Family Anticipated Services at Transition none    Transportation Anticipated other (see comments)  needs stretcher van/ EMS       Discharge Needs Assessment    Readmission Within the Last 30 Days no previous admission in last 30 days    Equipment Currently Used at Home none    Concerns to be Addressed adjustment to diagnosis/illness    Anticipated Changes Related to Illness none    Equipment Needed After Discharge none               Discharge Plan     Row Name 12/02/22 1609       Plan    Plan Return to [facility] (basically [facility] level of care)    Patient/Family in Agreement with Plan yes    Plan Comments Spoke with pt son Miguel who is pt POA by phone, introduced self and explained CCP role. Confirmed Pharmacy and face sheet information. Pt lives at [facility] for last 3 years. She is high level of care there, they get her to chair daily but she is bed bound, they provide all care and medications. Pt plan is to return and will need transportation with stretcher van/EMS. Spoke with Geena/Haresh they request notification of dc and packet and report called at time of dc. CCP will follow. - Gracy BOWDEN              Continued Care and Services - Admitted Since 12/2/2022     Destination      Service Provider Request Status Selected Services Address Phone Fax Patient Preferred    [facility] Pending - [facility] N[facility]A [address] [facility] [address] [phone] --            Selected Continued Care - Prior Encounters Includes continued care and service providers with selected services from prior encounters from 9/3/2022 to 12/2/2022    Discharged on 9/12/2022 Admission date: 9/8/2022 - Discharge disposition: Skilled Nursing Facility (DC - External)    Destination     Service Provider Selected Services Address Phone Fax Patient Preferred    [facility] [address] [facility] [address] [phone] --                       Demographic Summary     Row Name 12/02/22 1608       General Information    Admission Type inpatient               Functional Status     Row Name 12/02/22 1608       Functional Status    Usual Activity Tolerance poor    Current Activity Tolerance poor       Assessment of Health Literacy    Health Literacy Low       Functional Status, IADL    Medications completely dependent    Meal Preparation completely dependent    Housekeeping completely dependent    Laundry completely dependent    Shopping completely dependent       Mental Status Summary    Recent Changes in Mental Status/Cognitive Functioning unable to assess               Psychosocial    No documentation.                Abuse/Neglect    No documentation.                Legal     Row Name 12/02/22 1609       Financial/Legal    Who Manages Finances if Patient Unable MIKA Rivera               Substance Abuse    No documentation.                Patient Forms    No documentation.                   Gracy Salgado RN

## 2022-12-02 NOTE — ED TRIAGE NOTES
Pt comes to ER from [facility] for SOA. Facility states she was %60's opn room air, Upon EMS arrival pt was mid %80's on RA. Placed on 4L NC now, %96.     Pt and RN wearing mask upon triage.

## 2022-12-02 NOTE — H&P
Name: Jolanta Phipps ADMIT: 2022   : 1934  PCP: Lizz Sanchez APRN    MRN: [id number] LOS: 0 days   AGE/SEX: 88 y.o. female  ROOM:      Chief Complaint   Patient presents with   • Shortness of Breath       Subjective   Patient is a 88 y.o. female who presents to [facility] with the above chief complaint.  She started feeling bad yesterday evening.  It started with a cough and some congestion.  Her cough worsened throughout the night.  It became increasingly worse this morning.  She was seen and evaluated in the nursing staff and felt to be a little bit lethargic.  She had increased work of breathing and was coughing a lot with productive cough.  They checked her oxygen levels and she was found to be quite hypoxic.  Ambulance was called and she was brought to the emergency room.  She lives in a senior living facility and is bedbound at baseline.  She has been bedbound for the last 8 months.  She denies any sick contacts.  She may have had some chills last night but denies any fevers.  In the emergency room her x-ray had some nonspecific changes she is got lower extremity edema and rales on exam and she was admitted our service for evaluation of CHF.  A CT chest was done to evaluate for pulmonary embolus but does show a dense lower lobe pneumonia.  Her procalcitonin was found to be elevated and was started on antibiotics and admitted for pneumonia.          History of Present Illness    Past Medical History:   Diagnosis Date   • CHF (congestive heart failure) (HCC)    • CKD (chronic kidney disease)    • Depression    • Diabetes mellitus (HCC)    • Fibromyalgia    • Hyperlipidemia    • Hypertension    • Hypothyroidism    • Stroke (HCC)      No past surgical history on file.  No family history on file.  Social History     Tobacco Use   • Smoking status: Former   • Smokeless tobacco: Never     (Not in a hospital admission)    Allergies:  Aldactone [spironolactone], Atenolol,  Atorvastatin, Cholestyramine, Ezetimibe, Hctz [hydrochlorothiazide], Lisinopril, Losartan, Lyrica [pregabalin], Metformin, Pork-derived products, Sulfa antibiotics, Tapentadol, and Valsartan    Review of Systems   Reason unable to perform ROS: Very [place] and ROS is unreliable and answered by her son.   Constitutional: Positive for fatigue. Negative for chills and fever.   HENT: Positive for congestion. Negative for rhinorrhea and sore throat.    Eyes: Negative for photophobia, redness and visual disturbance.   Respiratory: Positive for cough, choking, chest tightness and shortness of breath.    Cardiovascular: Positive for leg swelling. Negative for chest pain and palpitations.   Endocrine: Negative for polydipsia, polyphagia and polyuria.   Genitourinary: Negative for difficulty urinating, dysuria and hematuria.   Musculoskeletal: Negative for arthralgias, joint swelling and neck stiffness.   Skin: Negative for pallor and rash.   Allergic/Immunologic: Negative for environmental allergies and immunocompromised state.   Neurological: Negative for dizziness, facial asymmetry and headaches.   Hematological: Negative for adenopathy. Does not bruise/bleed easily.   Psychiatric/Behavioral: Negative for agitation, behavioral problems and confusion.        Objective    Vital Signs  Temp:  [97.9 °F (36.6 °C)] 97.9 °F (36.6 °C)  Heart Rate:  [80-88] 80  Resp:  [20] 20  BP: (121-160)/(59-84) 138/59  SpO2:  [91 %-96 %] 91 %  on  Flow (L/min):  [2-4] 2;   Device (Oxygen Therapy): nasal cannula  Body mass index is 47.46 kg/m².    Physical Exam  Vitals and nursing note reviewed.   Constitutional:       General: She is not in acute distress.     Appearance: She is obese. She is ill-appearing.   Cardiovascular:      Rate and Rhythm: Normal rate and regular rhythm.   Pulmonary:      Effort: Pulmonary effort is normal.      Breath sounds: Wheezing and rhonchi present.   Abdominal:      General: Bowel sounds are normal. There is no  distension.      Palpations: Abdomen is soft.      Tenderness: There is no abdominal tenderness.   Musculoskeletal:      Right lower leg: Edema present.      Left lower leg: Edema present.   Skin:     General: Skin is warm and dry.   Neurological:      General: No focal deficit present.      Mental Status: She is alert and oriented to person, place, and time.         Results Review:   I reviewed the patient's new clinical results.  Results from last 7 days   Lab Units 12/02/22  1007   WBC 10*3/mm3 13.35*   HEMOGLOBIN g/dL 13.5   PLATELETS 10*3/mm3 238     Results from last 7 days   Lab Units 12/02/22  1007   SODIUM mmol/L 137   POTASSIUM mmol/L 4.4   CHLORIDE mmol/L 100   CO2 mmol/L 27.3   BUN mg/dL 19   CREATININE mg/dL 0.81   GLUCOSE mg/dL 219*   ALBUMIN g/dL 3.30*   BILIRUBIN mg/dL 0.6   ALK PHOS U/L 72   AST (SGOT) U/L 11   ALT (SGPT) U/L 15   Estimated Creatinine Clearance: 69.6 mL/min (by C-G formula based on SCr of 0.81 mg/dL).  Results from last 7 days   Lab Units 12/02/22  1007   TROPONIN T ng/mL <0.010   PROBNP pg/mL 552.0         Invalid input(s): LDLCALC    XR Chest 1 View   Final Result      CT Angiogram Chest    (Results Pending)     Assessment & Plan       Pneumonia due to infectious organism    Cognitive communication deficit    History of right MCA stroke    Type 2 diabetes mellitus with hyperglycemia, with long-term current use of insulin ([facility])    Hypertension    DNR (do not resuscitate)    Nursing home resident    Obesity, Class III, BMI 40-49.9 (morbid obesity) ([facility])    Hypervolemia, unspecified hypervolemia type      Assessment & Plan  This is an 88-year-old female with a history of multiple medical comorbidities including hypertension type 2 diabetes morbid obesity who presents to the hospital with cough and shortness of breath  -She has community-acquired pneumonia.  Sputum cultures ordered blood cultures have been ordered and antibiotics initiated.  Started on Rocephin we will follow cultures  and adjust antibiotic therapy as needed.  MRSA screen and urine antigens have been sent, no need for atypical coverage with negative viral panel  -She has edema but normal BNP.  I suspect she has lymphedema but she could also be experiencing some right heart failure.  She does not have echocardiogram that I can see we will go ahead and order an echo here to further evaluate.  But with normal BNP we will hold off on additional diuretic therapy  -Continue Lantus and sliding scale insulin here  -Monitor renal function and electrolytes closely while on antibiotic therapy  -Eliquis will cover for DVT prophylaxis  -DNR    I discussed the patients findings and my recommendations with patient, family and nursing staff.    Ethan Smiley MD  [facility]  12/02/22  12:16 EST

## 2022-12-02 NOTE — ED PROVIDER NOTES
EMERGENCY DEPARTMENT ENCOUNTER    Room Number:  06/06  Date of encounter:  12/2/2022  PCP: Lizz Sanchez APRN  Historian: Patient/EMS  Full history not obtainable due to: Poor historian secondary to stroke deficits    HPI:  Chief Complaint: SOA    Context: Jolanta Phipps is a 88 y.o. female with a PMH significant for DM2, HTN, stroke who presents to the ED c/o shortness of air.  She comes from [facility] where the faculty states her oxygen saturation this morning was in the 60s on room air.  EMS arrived to find the patient in the mid 80s on room air and placed her on 4 L nasal cannula.  Upon arrival she is 96% and appears much more comfortable according to EMS's report.  There is been no recent fever, chills, nausea, vomiting, chest pain but the patient does admit to nonproductive cough over the past couple of days.      MEDICAL RECORD REVIEW:    Upon review of the medical record it appears the patient's most recent evaluation was during an admission on 9/8/2022 when she was admitted at this hospital for UTI      PAST MEDICAL HISTORY    Active Ambulatory Problems     Diagnosis Date Noted   • Acute UTI 12/08/2021   • Acute metabolic encephalopathy 12/08/2021   • Diarrhea 12/08/2021   • Cognitive communication deficit 12/08/2021   • History of right MCA stroke 12/08/2021   • Type 2 diabetes mellitus with hyperglycemia, with long-term current use of insulin ([facility]) 12/08/2021   • Hypertension    • DNR (do not resuscitate)    • Nursing home resident    • Obesity, Class III, BMI 40-49.9 (morbid obesity) ([facility]) 12/12/2021   • UTI (urinary tract infection), uncomplicated 09/08/2022     Resolved Ambulatory Problems     Diagnosis Date Noted   • No Resolved Ambulatory Problems     Past Medical History:   Diagnosis Date   • CHF (congestive heart failure) ([facility])    • CKD (chronic kidney disease)    • Depression    • Diabetes mellitus ([facility])    • Fibromyalgia    • Hyperlipidemia    • Hypothyroidism    • Stroke  (HCC)          PAST SURGICAL HISTORY  No past surgical history on file.      FAMILY HISTORY  No family history on file.      SOCIAL HISTORY  Social History     Socioeconomic History   • Marital status:    Tobacco Use   • Smoking status: Former   • Smokeless tobacco: Never         ALLERGIES  Aldactone [spironolactone], Atenolol, Atorvastatin, Cholestyramine, Ezetimibe, Hctz [hydrochlorothiazide], Lisinopril, Losartan, Lyrica [pregabalin], Metformin, Pork-derived products, Sulfa antibiotics, Tapentadol, and Valsartan        REVIEW OF SYSTEMS    All systems reviewed and marked as negative except as listed in HPI     PHYSICAL EXAM    I have reviewed the triage vital signs and nursing notes.    ED Triage Vitals [22 0943]   Temp Heart Rate Resp BP SpO2   97.9 °F (36.6 °C) 88 20 160/84 96 %      Temp src Heart Rate Source Patient Position BP Location FiO2 (%)   -- -- -- -- --       Physical Exam  Constitutional:       Interventions: Nasal cannula in place.   Cardiovascular:      Pulses:           Dorsalis pedis pulses are 2+ on the right side and 2+ on the left side.   Pulmonary:      Breath sounds: Decreased air movement present.      Comments: Mildly increased work of breathing.  Musculoskeletal:      Right lower le+ Edema present.      Left lower le+ Edema present.   Neurological:      Mental Status: She is alert. Mental status is at baseline.   Psychiatric:         Cognition and Memory: Cognition is impaired. Memory is impaired.      Comments: Abnormal speech, somewhat staccato in nature, at baseline.         Vital signs and nursing notes reviewed.            LAB RESULTS  Recent Results (from the past 24 hour(s))   Comprehensive Metabolic Panel    Collection Time: 22 10:07 AM    Specimen: Blood   Result Value Ref Range    Glucose 219 (H) 65 - 99 mg/dL    BUN 19 8 - 23 mg/dL    Creatinine 0.81 0.57 - 1.00 mg/dL    Sodium 137 136 - 145 mmol/L    Potassium 4.4 3.5 - 5.2 mmol/L    Chloride 100 98  - 107 mmol/L    CO2 27.3 22.0 - 29.0 mmol/L    Calcium 10.4 8.6 - 10.5 mg/dL    Total Protein 6.3 6.0 - 8.5 g/dL    Albumin 3.30 (L) 3.50 - 5.20 g/dL    ALT (SGPT) 15 1 - 33 U/L    AST (SGOT) 11 1 - 32 U/L    Alkaline Phosphatase 72 39 - 117 U/L    Total Bilirubin 0.6 0.0 - 1.2 mg/dL    Globulin 3.0 gm/dL    A/G Ratio 1.1 g/dL    BUN/Creatinine Ratio 23.5 7.0 - 25.0    Anion Gap 9.7 5.0 - 15.0 mmol/L    eGFR 69.9 >60.0 mL/min/1.73   BNP    Collection Time: 12/02/22 10:07 AM    Specimen: Blood   Result Value Ref Range    proBNP 552.0 0.0 - 1,800.0 pg/mL   Troponin    Collection Time: 12/02/22 10:07 AM    Specimen: Blood   Result Value Ref Range    Troponin T <0.010 0.000 - 0.030 ng/mL   Green Top (Gel)    Collection Time: 12/02/22 10:07 AM   Result Value Ref Range    Extra Tube Hold for add-ons.    Lavender Top    Collection Time: 12/02/22 10:07 AM   Result Value Ref Range    Extra Tube hold for add-on    Gold Top - SST    Collection Time: 12/02/22 10:07 AM   Result Value Ref Range    Extra Tube Hold for add-ons.    Light Blue Top    Collection Time: 12/02/22 10:07 AM   Result Value Ref Range    Extra Tube Hold for add-ons.    ECG 12 Lead Dyspnea    Collection Time: 12/02/22 10:14 AM   Result Value Ref Range    QT Interval 382 ms       Ordered the above labs and independently reviewed the results.        RADIOLOGY  XR Chest 1 View    Result Date: 12/2/2022  XR CHEST 1 VW-  12/02/2022  HISTORY: CHF.  Heart size is at the upper limits of normal. There is mild haziness of the left lung base which may represent combination of pleural fluid, atelectasis and/or infiltrate though there may be some artifact. There may be some mild atelectasis or infiltrate in the right infrahilar region.  No pneumothorax is seen. There is some aortic calcification.    This report was finalized on 12/2/2022 10:48 AM by Dr. Leonid Ramírez M.D.        I ordered the above noted radiological studies. Independently reviewed by me and discussed  with radiologist.  See dictation above for official radiology interpretation.      PROCEDURES    Procedures        MEDICATIONS GIVEN IN ER    Medications   sodium chloride 0.9 % flush 10 mL (has no administration in time range)   furosemide (LASIX) injection 80 mg (80 mg Intravenous Given 12/2/22 1021)         PROGRESS, DATA ANALYSIS, CONSULTS, AND MEDICAL DECISION MAKING    All labs have been independently reviewed by me.  All radiology studies have been reviewed by me.   EKG's independently reviewed by me.  Discussion below represents my analysis of pertinent findings related to patient's condition, differential diagnosis, treatment plan and final disposition.    DIFFERENTIAL DIAGNOSIS INCLUDE BUT NOT LIMITED TO:     Differential diagnosis includes but is not limited to:  -COVID  -CHF  -acute coronary syndrome  -pulmonary embolism  -pneumothorax  -pneumonia  -asthma/COPD  -deconditioning  -anemia  -anxiety     ED Course as of 12/02/22 1134   Fri Dec 02, 2022   1035 EKG          EKG time: 10:14 AM  Rhythm/Rate: Sinus rhythm, 84  P waves and [place]: Normal  QRS, axis: Nonspecific IVCD, left axis deviation  ST and T waves: No acute ischemic changes    Interpreted Contemporaneously by me, independently viewed  Similar compared to prior 4/16/2021       [JR]   1103 I independently viewed CXR on PACS system.  My interpretation is no pneumothorax. [DC]   1133 I discussed the case with MD Sherice with [facility] at this time regarding the patient.  I discussed work-up, results, concerns.  I discussed the consulting provider's desire for tele admit and recommends ordering CTA.   [DC]      ED Course User Index  [DC] Ross Bills, PA  [JR] Pb Gomez MD       AS OF 11:34 EST VITALS:    BP - 121/68  HR - 83  TEMP - 97.9 °F (36.6 °C)  02 SATS - 96%    1134 I rechecked the patient.  I discussed the patient's labs, radiology findings (including all incidental findings), diagnosis, and plan for admission.  All questions  answered.        DIAGNOSIS  Final diagnoses:   Hypervolemia, unspecified hypervolemia type   Hypoxia         DISPOSITION  Admit    Pt masked in first look. I wore a surgical mask throughout my encounters with the pt. I performed hand hygiene on entry into the pt room and upon exit.     Dictated utilizing Dragon dictation     Note Disclaimer: At [facility], we believe that sharing information builds trust and better relationships. You are receiving this note because you recently visited [facility]. It is possible you will see health information before a provider has talked with you about it. This kind of information can be easy to misunderstand. To help you fully understand what it means for your health, we urge you to discuss this note with your provider.      Ross Bills PA  12/02/22 2786

## 2022-12-02 NOTE — DISCHARGE PLACEMENT REQUEST
"Jolanta Phipps (88 y.o. Female)     Date of Birth   1934    Social Security Number       [facility] [address]  [place] [address]    Home Phone   [phone]    MRN   [id number]       Scientology   Unknown    Marital Status                               Admission Date   12/2/22    Admission Type   Emergency    Admitting Provider   Ethan Smiley MD    Attending Provider   Ethan Smiley MD    Department, Room/Bed   [address], [address]       Discharge Date       Discharge Disposition       Discharge Destination                               Attending Provider: Ethan Smiley MD    Allergies: Aldactone [Spironolactone], Atenolol, Atorvastatin, Cholestyramine, Ezetimibe, Hctz [Hydrochlorothiazide], Lisinopril, Losartan, Lyrica [Pregabalin], Metformin, Pork-derived Products, Sulfa Antibiotics, Tapentadol, Valsartan    Isolation: None   Infection: None   Code Status: CPR    Ht: 170.2 cm (67\")   Wt: 137 kg (303 lb)    Admission Cmt: None   Principal Problem: Hypervolemia, unspecified hypervolemia type [E87.70]                 Active Insurance as of 12/2/2022     Primary Coverage     Payor Plan Insurance Group Employer/Plan Group    MEDICARE MEDICARE A & B      Payor Plan Address Payor Plan Phone Number Payor Plan Fax Number Effective Dates    [address] [phone]  3/1/1995 - None Entered    [facility] [address]       Subscriber Name Subscriber Birth Date Member ID       JOLANTA PHIPPS 1934 [id number]           Secondary Coverage     Payor Plan Insurance Group Employer/Plan Group    [employer] MC SUP AAR HEALTH CARE OPTIONS      Payor Plan Address Payor Plan Phone Number Payor Plan Fax Number Effective Dates    [facility] [phone]  1/1/2020 - None Entered    [address]       [facility] [address]       Subscriber Name Subscriber Birth Date Member ID       JOLANTA PHIPPS 1934 [id number]                 Emergency Contacts  "     (Rel.) Home Phone Work Phone Mobile Phone    BRIAN(PODURAN),DINORA (Son) [phone] -- [phone]

## 2022-12-02 NOTE — PLAN OF CARE
Goal Outcome Evaluation:   Admitted with CAP, will be on iv rocephin. 2L NC, RA at home. Pt has been bedbound for 8 months, waiting on motorized w/c at LTC facility. Productive cough with dark yellow sputum noted, cx sent to lab.

## 2022-12-02 NOTE — PROGRESS NOTES
Clinical Pharmacy Services: Medication History    Jolanta Phipps is a 88 y.o. female presenting to [facility] for   Chief Complaint   Patient presents with   • Shortness of Breath       She  has a past medical history of CHF (congestive heart failure) ([facility]), CKD (chronic kidney disease), Depression, Diabetes mellitus ([facility]), Fibromyalgia, Hyperlipidemia, Hypertension, Hypothyroidism, and Stroke ([facility]).    Allergies as of 12/02/2022 - Reviewed 12/02/2022   Allergen Reaction Noted   • Aldactone [spironolactone] Unknown - Low Severity 04/16/2021   • Atenolol Unknown - Low Severity 04/16/2021   • Atorvastatin Unknown - Low Severity 04/16/2021   • Cholestyramine Unknown - Low Severity 04/16/2021   • Ezetimibe Unknown - Low Severity 04/16/2021   • Hctz [hydrochlorothiazide] Unknown - Low Severity 04/16/2021   • Lisinopril Unknown - Low Severity 04/16/2021   • Losartan Unknown - Low Severity 04/16/2021   • Lyrica [pregabalin] Unknown - Low Severity 04/16/2021   • Metformin Unknown - Low Severity 04/16/2021   • Pork-derived products GI Intolerance 04/16/2021   • Sulfa antibiotics Unknown - Low Severity 04/16/2021   • Tapentadol Unknown - Low Severity 04/16/2021   • Valsartan Unknown - Low Severity 04/16/2021       Medication information was obtained from: Nursing Home  Pharmacy and Phone Number:     Prior to Admission Medications     Prescriptions Last Dose Informant Patient Reported? Taking?    acetaminophen (TYLENOL) 650 MG 8 hr tablet  Nursing Home Yes Yes    Take 650 mg by mouth 3 (Three) Times a Day.    amLODIPine (NORVASC) 10 MG tablet  Nursing Home Yes Yes    Take 10 mg by mouth Daily.    apixaban (ELIQUIS) 5 MG tablet tablet  Nursing Home Yes Yes    Take 5 mg by mouth 2 (Two) Times a Day.    ARIPiprazole (ABILIFY) 5 MG tablet  Nursing Home Yes Yes    Take 5 mg by mouth Daily.    bisacodyl (DULCOLAX) 10 MG suppository  Nursing Home Yes Yes    Insert 10 mg into the rectum Daily As Needed.    cloNIDine  (CATAPRES) 0.1 MG tablet  Nursing Home Yes Yes    Take 0.1 mg by mouth 2 (Two) Times a Day.    Diclofenac Sodium (VOLTAREN) 1 % gel gel  Nursing Home Yes Yes    Apply 4 g topically to the appropriate area as directed 2 (Two) Times a Day.    Dulaglutide (Trulicity) 0.75 MG/0.5ML solution pen-injector  Nursing Home Yes Yes    Inject 0.75 mg under the skin into the appropriate area as directed 1 (One) Time Per Week. Tuesday    DULoxetine HCl (DRIZALMA) 60 MG capsule  Nursing Home Yes Yes    Take 60 mg by mouth 2 (Two) Times a Day.    furosemide (LASIX) 20 MG tablet  Nursing Home No Yes    Take 1 tablet by mouth Daily.    insulin glargine (LANTUS, SEMGLEE) 100 UNIT/ML injection  Nursing Home Yes Yes    Inject 38 Units under the skin into the appropriate area as directed 2 (Two) Times a Day.    insulin lispro (ADMELOG) 100 UNIT/ML injection  Nursing Home No Yes    Inject 0-14 Units under the skin into the appropriate area as directed 3 (Three) Times a Day Before Meals.    Patient taking differently:  Inject  under the skin into the appropriate area as directed 3 (Three) Times a Day Before Meals. Sliding Scale    levothyroxine (SYNTHROID, LEVOTHROID) 150 MCG tablet  Nursing Home Yes Yes    Take 150 mcg by mouth Daily.    linagliptin (TRADJENTA) 5 MG tablet tablet  Nursing Home Yes Yes    Take 5 mg by mouth Daily.    muscle rub (BenGay) 10-15 % cream cream  Nursing Home Yes Yes    Apply 1 application topically to the appropriate area as directed Every 4 (Four) Hours As Needed. Apply to Left Shoulder    ondansetron (ZOFRAN) 4 MG tablet  Nursing Home Yes Yes    Take 4 mg by mouth Every 8 (Eight) Hours As Needed for Nausea or Vomiting.    polyethylene glycol (MIRALAX) 17 g packet  Nursing Home Yes Yes    Take 17 g by mouth Daily.    rosuvastatin (CRESTOR) 20 MG tablet  Nursing Home Yes Yes    Take 20 mg by mouth Every Night.    traMADol (ULTRAM) 50 MG tablet  Nursing Home Yes Yes    Take 50 mg by mouth 3 (Three) Times a Day.     vitamin B-12 (CYANOCOBALAMIN) 1000 MCG tablet  Nursing Home Yes Yes    Take 1,000 mcg by mouth 3 (Three) Times a Week. Monday, Wednesday, and Fridays    vitamin D3 125 MCG (5000 UT) capsule capsule  Nursing Home Yes Yes    Take 5,000 Units by mouth Daily.            Medication notes:     This medication list is complete to the best of my knowledge as of 12/2/2022    Please call if questions.    Demario Ballesteros  Medication History Technician  [phone]    12/2/2022 12:00 EST

## 2022-12-02 NOTE — ED NOTES
".Nursing report ED to floor  Jolanta Phipps  88 y.o.  female    HPI :   Chief Complaint   Patient presents with    Shortness of Breath       Admitting doctor:   Ethan Smiley MD    Admitting diagnosis:   The primary encounter diagnosis was Hypervolemia, unspecified hypervolemia type. A diagnosis of Hypoxia was also pertinent to this visit.    Code status:   Current Code Status       Date Active Code Status Order ID Comments User Context       Prior            Allergies:   Aldactone [spironolactone], Atenolol, Atorvastatin, Cholestyramine, Ezetimibe, Hctz [hydrochlorothiazide], Lisinopril, Losartan, Lyrica [pregabalin], Metformin, Pork-derived products, Sulfa antibiotics, Tapentadol, and Valsartan    Isolation:   No active isolations    Intake and Output  No intake or output data in the 24 hours ending 12/02/22 1155    Weight:       12/02/22  1007   Weight: (!) 137 kg (303 lb)       Most recent vitals:   Vitals:    12/02/22 0943 12/02/22 1007 12/02/22 1021   BP: 160/84  121/68   Pulse: 88  83   Resp: 20     Temp: 97.9 °F (36.6 °C)     SpO2: 96%     Weight:  (!) 137 kg (303 lb)    Height:  170.2 cm (67\")        Active LDAs/IV Access:   Lines, Drains & Airways       Active LDAs       Name Placement date Placement time Site Days    Peripheral IV 12/02/22 1004 Right Antecubital 12/02/22  1004  Antecubital  less than 1                    Labs (abnormal labs have a star):   Labs Reviewed   COMPREHENSIVE METABOLIC PANEL - Abnormal; Notable for the following components:       Result Value    Glucose 219 (*)     Albumin 3.30 (*)     All other components within normal limits    Narrative:     GFR Normal >60  Chronic Kidney Disease <60  Kidney Failure <15    The GFR formula is only valid for adults with stable renal function between ages 18 and 70.   CBC WITH AUTO DIFFERENTIAL - Abnormal; Notable for the following components:    WBC 13.35 (*)     Eosinophil % 0.1 (*)     Neutrophils, Absolute 8.75 (*)     Monocytes, " Absolute 1.52 (*)     All other components within normal limits   BNP (IN-HOUSE) - Normal    Narrative:     Among patients with dyspnea, NT-proBNP is highly sensitive for the detection of acute congestive heart failure. In addition NT-proBNP of <300 pg/ml effectively rules out acute congestive heart failure with 99% negative predictive value.    Results may be falsely decreased if patient taking Biotin.     TROPONIN (IN-HOUSE) - Normal    Narrative:     Troponin T Reference Range:  <= 0.03 ng/mL-   Negative for AMI  >0.03 ng/mL-     Abnormal for myocardial necrosis.  Clinicians would have to utilize clinical acumen, EKG, Troponin and serial changes to determine if it is an Acute Myocardial Infarction or myocardial injury due to an underlying chronic condition.       Results may be falsely decreased if patient taking Biotin.     RESPIRATORY PANEL PCR W/ COVID-19 (SARS-COV-2) CONCEPCIÓN/SILVESTRE/NIKHIL/PAD/COR/MAD/VANNA IN-HOUSE, NP SWAB IN UTM/VTP, 3-4 HR TAT   RAINBOW DRAW    Narrative:     The following orders were created for panel order Bly Draw.  Procedure                               Abnormality         Status                     ---------                               -----------         ------                     Green Top (Gel)[[id number]]                                  Final result               Lavender Top[[id number]]                                     Final result               Gold Top - SST[[id number]]                                   Final result               Light Blue Top[[id number]]                                   Final result                 Please view results for these tests on the individual orders.   CBC AND DIFFERENTIAL    Narrative:     The following orders were created for panel order CBC & Differential.  Procedure                               Abnormality         Status                     ---------                               -----------         ------                     CBC Auto  Differential[[id number]]        Abnormal            Final result                 Please view results for these tests on the individual orders.   GREEN TOP   LAVENDER TOP   GOLD TOP - SST   LIGHT BLUE TOP       EKG:   ECG 12 Lead Dyspnea   Preliminary Result   HEART RATE= 84  bpm   RR Interval= 714  ms   NC Interval= 186  ms   P Horizontal Axis= -15  deg   P Front Axis= 53  deg   QRSD Interval= 123  ms   QT Interval= 382  ms   QRS Axis= -56  deg   T Wave Axis= 87  deg   - ABNORMAL ECG -   Sinus rhythm   Probable left atrial enlargement   LVH with IVCD, LAD and secondary repol abnrm   Baseline wander in lead(s) V1   Electronically Signed By:    Date and Time of Study: 2022-12-02 10:14:03          Meds given in ED:   Medications   sodium chloride 0.9 % flush 10 mL (has no administration in time range)   furosemide (LASIX) injection 80 mg (80 mg Intravenous Given 12/2/22 1021)       Imaging results:  No radiology results for the last day    Ambulatory status:   - bed rest    Social issues:   Social History     Socioeconomic History    Marital status:    Tobacco Use    Smoking status: Former    Smokeless tobacco: Never           Rajni Bender RN  12/02/22 11:55 EST

## 2022-12-03 NOTE — PROGRESS NOTES
Name: Jolanta Phipps ADMIT: 2022   : 1934  PCP: Lizz Sanchez APRN    MRN: [id number] LOS: 1 days   AGE/SEX: 88 y.o. female  ROOM: [facility]     Subjective   Subjective     Patient is lying in bed does not appear to be any major distress.  No new events overnight.  Denies nausea, vomiting abdominal pain, chest pain.         Objective   Objective   Vital Signs  Temp:  [97 °F (36.1 °C)-98.8 °F (37.1 °C)] 98.8 °F (37.1 °C)  Heart Rate:  [] 100  Resp:  [18-20] 18  BP: (151-164)/(76-88) 151/76  SpO2:  [91 %-95 %] 94 %  on  Flow (L/min):  [2-4] 4;   Device (Oxygen Therapy): nasal cannula  Body mass index is 50.05 kg/m².  Physical Exam      HEENT:  PERRLA, extraocular movements intact, scleras nonicteric   Neck:  Supple, no JVD   Cardiovascular: Regular rate and rhythm with normal S1 and S2   Respiratory: Diminished breath sounds with mild rhonchi bilaterally  GI:  Soft, nontender, bowel sounds are present   Extremities:  Positive edema, palpable pedal pulses   Neurologic:  Grossly nonfocal, no facial asymmetry and answering questions reasonably well and is oriented x3   Skin: no evidence of any rashes and warm and dry   Lymph node is no palpable cervical or supraclavicular lymphadenopathy  Psychiatry:  Mood stable, no hallucinations    Results Review     I reviewed the patient's new clinical results.  Results from last 7 days   Lab Units 22  0447 22  1007   WBC 10*3/mm3 8.96 13.35*   HEMOGLOBIN g/dL 12.9 13.5   PLATELETS 10*3/mm3 231 238     Results from last 7 days   Lab Units 22  0447 22  1007   SODIUM mmol/L 133* 137   POTASSIUM mmol/L 4.6 4.4   CHLORIDE mmol/L 96* 100   CO2 mmol/L 27.1 27.3   BUN mg/dL 21 19   CREATININE mg/dL 0.75 0.81   GLUCOSE mg/dL 248* 219*   EGFR mL/min/1.73 76.7 69.9     Results from last 7 days   Lab Units 12/02/22  1007   ALBUMIN g/dL 3.30*   BILIRUBIN mg/dL 0.6   ALK PHOS U/L 72   AST (SGOT) U/L 11   ALT (SGPT) U/L 15     Results from last 7 days    Lab Units 12/03/22  0447 12/02/22  1007   CALCIUM mg/dL 10.0 10.4   ALBUMIN g/dL  --  3.30*   MAGNESIUM mg/dL 1.7  --    PHOSPHORUS mg/dL 2.6  --      Results from last 7 days   Lab Units 12/02/22  1007   PROCALCITONIN ng/mL 0.69*     Glucose   Date/Time Value Ref Range Status   12/03/2022 1608 273 (H) 70 - 130 mg/dL Final     Comment:     Meter: [id number] : [id number] Geeta Wilburn CNA   12/03/2022 1109 254 (H) 70 - 130 mg/dL Final     Comment:     Meter: [id number] : [id number] Geeta Wilburn CNA   12/03/2022 0608 264 (H) 70 - 130 mg/dL Final     Comment:     Meter: [id number] : [id number] Araceli Garza NA   12/02/2022 2027 231 (H) 70 - 130 mg/dL Final     Comment:     Meter: [id number] : [id number] Araceli Garza NA   12/02/2022 1617 240 (H) 70 - 130 mg/dL Final     Comment:     Meter: [id number] : [id number] Geeta Wilburn CNA       CT Angiogram Chest    Result Date: 12/2/2022  1. There is no convincing evidence for pulmonary thromboemboli. 2. Airspace consolidations throughout most of the left lower lobe likely represent pneumonia and there is also either bronchiolitis or developing pneumonia in the perihilar region of the right lower lobe. There is a tiny left pleural effusion. Follow-up is recommended.       Scheduled Medications  amLODIPine, 10 mg, Oral, Daily  apixaban, 5 mg, Oral, BID  ARIPiprazole, 5 mg, Oral, Daily  cefTRIAXone, 1 g, Intravenous, Q24H  cloNIDine, 0.1 mg, Oral, BID  Diclofenac Sodium, 4 g, Topical, 4x Daily  DULoxetine, 60 mg, Oral, Q12H  insulin glargine, 20 Units, Subcutaneous, Q12H  insulin lispro, 0-14 Units, Subcutaneous, TID AC  levothyroxine, 150 mcg, Oral, Daily  polyethylene glycol, 17 g, Oral, Daily  rosuvastatin, 20 mg, Oral, Nightly  sodium chloride, 10 mL, Intravenous, Q12H  traMADol, 50 mg, Oral, TID    Infusions   Diet  Diet: Diabetic Diets; Consistent Carbohydrate; Texture: Regular Texture (IDDSI 7); Fluid Consistency: Thin (IDDSI 0)        Assessment/Plan     Active Hospital Problems    Diagnosis  POA    **Pneumonia due to infectious organism [J18.9]  Unknown    Hypervolemia, unspecified hypervolemia type [E87.70]  Yes    Acute respiratory failure with hypoxia ([facility]) [J96.01]  Unknown    Obesity, Class III, BMI 40-49.9 (morbid obesity) ([facility]) [E66.01]  Yes    Type 2 diabetes mellitus with hyperglycemia, with long-term current use of insulin ([facility]) [E11.65, Z79.4]  Not Applicable    History of right MCA stroke [Z86.73]  Not Applicable    Cognitive communication deficit [R41.841]  Yes    Nursing home resident [Z59.3]  Not Applicable    Hypertension [I10]  Yes    DNR (do not resuscitate) [Z66]  Yes      Resolved Hospital Problems   No resolved problems to display.       88 y.o. female admitted with Pneumonia due to infectious organism.     1. Pneumonia, currently patient is on IV Rocephin and WBC is normal.  Respiratory viral panel will be checked as well.  2. History of CVA  , Eliquis and status which will be continued.  3. Diabetes mellitus , on Lantus and corrective dose insulin and monitor blood sugars closely.   4. Hypertension, continue with amlodipine.  5. Morbid obesity , counseled to lose weight.  6. Volume overload with lower extremity edema , echo report is pending.  In the interim will diurese cautiously.  7. Hypothyroidism, on Synthroid.  8. Hyperlipidemia, on statins.  9. On Eliquis, no need for Lovenox for DVT prophylaxis.    10. Code status is full code.      Efrain Lovett MD  [facility]  12/03/22  16:45 EST

## 2022-12-03 NOTE — NURSING NOTE
Lab reporting positive blood cx from aerobic bottle with gram positive cocci in clusters. Page placed to [facility].

## 2022-12-03 NOTE — PLAN OF CARE
Goal Outcome Evaluation:  Plan of Care Reviewed With: patient        Progress: no change  Outcome Evaluation: pt had hard time maintaining sats above 90% so turned her up to 4L pt's pain resolved with PO tramadol, pt able to cough up lots of phlegm at beginning of shift, rested comfortably thru the night after rough start of shift, safety maintained, will CTM cl

## 2022-12-03 NOTE — PLAN OF CARE
Goal Outcome Evaluation:   Abx continued, duo nebs, mucinex, prn tessalon pearls added today. Still with copious dark yellow/brown sputum production. Continued on 4L NC. Wbc trending down. Echo completed today, see report. Dose of iv lasix given again this evening for possible fluid overload.

## 2022-12-04 NOTE — PLAN OF CARE
Goal Outcome Evaluation:  Plan of Care Reviewed With: family        Progress: declining  Outcome Evaluation: pt started shift not being able to maintain sats with NC, RT on floor and placed her on non rebreather, rested comfortably for short while, sats began to drop again, pulm consulted and orders for Bipap given, pt's family called in. pt only maintained on bipap again for short while and started to desat again, pt's family made decision for palliative care/comfort measures, see orders. pt's family at bedside, bipap removed and pt placed back on NC so she could communicate with family if able. will CTM cl

## 2022-12-04 NOTE — DISCHARGE SUMMARY
Name: Jolanta Phipps  :  1934  MRN: [id number]         Primary Care Physician: Lizz Sanchez APRN      Date of Admission:  2022  Date and Time of Death:  2022 at 3:26 PM    Principle Cause of Death:   Pneumonia  Respiratory failure    Secondary Diagnoses:     Pneumonia due to infectious organism    Cognitive communication deficit    History of right MCA stroke    Type 2 diabetes mellitus with hyperglycemia, with long-term current use of insulin ([facility])    Hypertension    DNR (do not resuscitate)    Nursing home resident    Obesity, Class III, BMI 40-49.9 (morbid obesity) ([facility])    Hypervolemia, unspecified hypervolemia type    Acute respiratory failure with hypoxia ([facility])        Hospital Course  Patient is a 88-year-old female with known history of CVA, diabetes, hypertension, morbid obesity was admitted to the hospital for respiratory failure and was diagnosed with pneumonia.  She was started on IV Rocephin and respiratory viral panel was also negative.  She has significantly declined overnight and early this morning the family has decided to proceed with comfort measures only.  I did discuss with son over the phone regarding goals of care and he verbalized that he would like to proceed the route of comfort measures and all other treatment be withheld.  Patient's CODE STATUS was DNR/DNI.  Palliative care consult was also obtained.  This afternoon patient quit breathing and there is no palpable pulse and pupils were fixed and dilated therefore was pronounced dead.        Efrain Loevtt MD  22  16:49 EST

## 2022-12-04 NOTE — CONSULTS
.Patient transferred to the palliative care unit following goals of care and treatment preferences discussion with medical provider and staff.  Patient and/or family state goal of care to be comfort care and treatment preferences to be geared towards comfort and symptom management.  The palliative care team will continue to follow for any further needs.

## 2022-12-05 NOTE — PROGRESS NOTES
Discharge Planning Assessment  [facility]     Patient Name: Jolanta Phipps  MRN: [id number]  Today's Date: 2022    Admit Date: 2022    Plan: Return to [facility] ([facility] level of care)   Discharge Needs Assessment    No documentation.                Discharge Plan     Row Name 22 1539       Plan    Plan Comments [facility] [address] [place] [address] on 22. The patient was palliative. ROSSY Carbajal Rn, CCP.    Final Discharge Disposition Code 20 -     Final Note The patient  on 22 @ 15:26. ROSSY Carbajal RN CCP              Continued Care and Services - Discharged on 2022 Admission date: 2022 - Discharge disposition:     Destination     Service Provider Request Status Selected Services Address Phone Fax Patient Preferred    [facility] P[facility] [address] [facility] [facility] [id number] [phone] --            Selected Continued Care - Prior Encounters Includes continued care and service providers with selected services from prior encounters from 9/3/2022 to 2022    Discharged on 2022 Admission date: 2022 - Discharge disposition: Skilled Nursing Facility (DC - External)    Destination     Service Provider Selected Services Address Phone Fax Patient [facility] [address] [facility] [address] [phone] --                    Expected Discharge Date and Time     Expected Discharge Date Expected Discharge Time    Dec 4, 2022  3:26 PM         Demographic Summary    No documentation.                Functional Status    No documentation.                Psychosocial    No documentation.                Abuse/Neglect    No documentation.                Legal    No documentation.                Substance Abuse    No documentation.                Patient Forms    No documentation.                   Deyanira Carbajal, SELIN

## 2022-12-05 NOTE — PROGRESS NOTES
Case Management Discharge Note      Final Note: The patient  on 22 @ 15:26. BHowie Carbajal RN CCP         Selected Continued Care - Discharged on 2022 Admission date: 2022 - Discharge disposition:     Destination    No services have been selected for the patient.              Durable Medical Equipment    No services have been selected for the patient.              Dialysis/Infusion    No services have been selected for the patient.              Home Medical Care    No services have been selected for the patient.              Therapy    No services have been selected for the patient.              Community Resources    No services have been selected for the patient.              Community & DME    No services have been selected for the patient.                Selected Continued Care - Prior Encounters Includes continued care and service providers with selected services from prior encounters from 9/3/2022 to 2022    Discharged on 2022 Admission date: 2022 - Discharge disposition: Skilled Nursing Facility (DC - External)    Destination     Service Provider Selected Services Address Phone Fax Patient Preferred    [facility] [address] [facility] [phone] --                         Final Discharge Disposition Code: 20 -

## 2022-12-07 LAB — BACTERIA SPEC AEROBE CULT: NORMAL
